# Patient Record
Sex: MALE | Race: AMERICAN INDIAN OR ALASKA NATIVE | HISPANIC OR LATINO | Employment: UNEMPLOYED | URBAN - METROPOLITAN AREA
[De-identification: names, ages, dates, MRNs, and addresses within clinical notes are randomized per-mention and may not be internally consistent; named-entity substitution may affect disease eponyms.]

---

## 2021-01-22 ENCOUNTER — HOSPITAL ENCOUNTER (INPATIENT)
Facility: HOSPITAL | Age: 66
LOS: 4 days | Discharge: HOME | DRG: 871 | End: 2021-01-26
Attending: EMERGENCY MEDICINE | Admitting: INTERNAL MEDICINE
Payer: MEDICARE

## 2021-01-22 ENCOUNTER — APPOINTMENT (EMERGENCY)
Dept: RADIOLOGY | Facility: HOSPITAL | Age: 66
DRG: 871 | End: 2021-01-22
Payer: MEDICARE

## 2021-01-22 DIAGNOSIS — J12.82 PNEUMONIA DUE TO COVID-19 VIRUS: Primary | ICD-10-CM

## 2021-01-22 DIAGNOSIS — A41.9 SEPSIS, DUE TO UNSPECIFIED ORGANISM, UNSPECIFIED WHETHER ACUTE ORGAN DYSFUNCTION PRESENT (HCC): ICD-10-CM

## 2021-01-22 DIAGNOSIS — E11.9 TYPE 2 DIABETES MELLITUS (HCC): ICD-10-CM

## 2021-01-22 DIAGNOSIS — R09.02 HYPOXIA: ICD-10-CM

## 2021-01-22 DIAGNOSIS — U07.1 PNEUMONIA DUE TO COVID-19 VIRUS: Primary | ICD-10-CM

## 2021-01-22 PROBLEM — R73.9 ELEVATED SERUM GLUCOSE: Status: ACTIVE | Noted: 2021-01-22

## 2021-01-22 PROBLEM — E87.1 HYPONATREMIA: Status: ACTIVE | Noted: 2021-01-22

## 2021-01-22 LAB
ABO GROUP BLD: NORMAL
ABO GROUP BLD: NORMAL
ALBUMIN SERPL BCP-MCNC: 3 G/DL (ref 3.5–5)
ALP SERPL-CCNC: 121 U/L (ref 46–116)
ALT SERPL W P-5'-P-CCNC: 76 U/L (ref 12–78)
ANION GAP SERPL CALCULATED.3IONS-SCNC: 8 MMOL/L (ref 4–13)
APTT PPP: 50 SECONDS (ref 23–37)
AST SERPL W P-5'-P-CCNC: 65 U/L (ref 5–45)
BASOPHILS # BLD AUTO: 0.03 THOUSANDS/ΜL (ref 0–0.1)
BASOPHILS NFR BLD AUTO: 0 % (ref 0–1)
BILIRUB SERPL-MCNC: 0.48 MG/DL (ref 0.2–1)
BLD GP AB SCN SERPL QL: NEGATIVE
BUN SERPL-MCNC: 11 MG/DL (ref 5–25)
CALCIUM ALBUM COR SERPL-MCNC: 9.7 MG/DL (ref 8.3–10.1)
CALCIUM SERPL-MCNC: 8.9 MG/DL (ref 8.3–10.1)
CHLORIDE SERPL-SCNC: 95 MMOL/L (ref 100–108)
CK SERPL-CCNC: 54 U/L (ref 39–308)
CO2 SERPL-SCNC: 28 MMOL/L (ref 21–32)
CREAT SERPL-MCNC: 0.93 MG/DL (ref 0.6–1.3)
CRP SERPL HS-MCNC: >90 MG/L
D DIMER PPP FEU-MCNC: 0.65 UG/ML FEU
EOSINOPHIL # BLD AUTO: 0.01 THOUSAND/ΜL (ref 0–0.61)
EOSINOPHIL NFR BLD AUTO: 0 % (ref 0–6)
ERYTHROCYTE [DISTWIDTH] IN BLOOD BY AUTOMATED COUNT: 14 % (ref 11.6–15.1)
FERRITIN SERPL-MCNC: 1806 NG/ML (ref 8–388)
FLUAV RNA RESP QL NAA+PROBE: NEGATIVE
FLUBV RNA RESP QL NAA+PROBE: NEGATIVE
GFR SERPL CREATININE-BSD FRML MDRD: 85 ML/MIN/1.73SQ M
GLUCOSE SERPL-MCNC: 152 MG/DL (ref 65–140)
GLUCOSE SERPL-MCNC: 166 MG/DL (ref 65–140)
GLUCOSE SERPL-MCNC: 204 MG/DL (ref 65–140)
HCT VFR BLD AUTO: 39.7 % (ref 36.5–49.3)
HGB BLD-MCNC: 13.4 G/DL (ref 12–17)
HIV 1+2 AB+HIV1 P24 AG SERPL QL IA: NORMAL
HIV1 P24 AG SER QL: NORMAL
IMM GRANULOCYTES # BLD AUTO: 0.07 THOUSAND/UL (ref 0–0.2)
IMM GRANULOCYTES NFR BLD AUTO: 1 % (ref 0–2)
INR PPP: 1.04 (ref 0.84–1.19)
LACTATE SERPL-SCNC: 1.4 MMOL/L (ref 0.5–2)
LYMPHOCYTES # BLD AUTO: 0.53 THOUSANDS/ΜL (ref 0.6–4.47)
LYMPHOCYTES NFR BLD AUTO: 5 % (ref 14–44)
MCH RBC QN AUTO: 30.1 PG (ref 26.8–34.3)
MCHC RBC AUTO-ENTMCNC: 33.8 G/DL (ref 31.4–37.4)
MCV RBC AUTO: 89 FL (ref 82–98)
MONOCYTES # BLD AUTO: 0.44 THOUSAND/ΜL (ref 0.17–1.22)
MONOCYTES NFR BLD AUTO: 4 % (ref 4–12)
NEUTROPHILS # BLD AUTO: 9.36 THOUSANDS/ΜL (ref 1.85–7.62)
NEUTS SEG NFR BLD AUTO: 90 % (ref 43–75)
NRBC BLD AUTO-RTO: 0 /100 WBCS
NT-PROBNP SERPL-MCNC: 69 PG/ML
PLATELET # BLD AUTO: 330 THOUSANDS/UL (ref 149–390)
PMV BLD AUTO: 9.9 FL (ref 8.9–12.7)
POTASSIUM SERPL-SCNC: 3.5 MMOL/L (ref 3.5–5.3)
PROCALCITONIN SERPL-MCNC: 0.15 NG/ML
PROT SERPL-MCNC: 7.8 G/DL (ref 6.4–8.2)
PROTHROMBIN TIME: 13.7 SECONDS (ref 11.6–14.5)
RBC # BLD AUTO: 4.45 MILLION/UL (ref 3.88–5.62)
RH BLD: POSITIVE
RH BLD: POSITIVE
RSV RNA RESP QL NAA+PROBE: NEGATIVE
SARS-COV-2 RNA RESP QL NAA+PROBE: POSITIVE
SODIUM SERPL-SCNC: 131 MMOL/L (ref 136–145)
SPECIMEN EXPIRATION DATE: NORMAL
TROPONIN I SERPL-MCNC: <0.02 NG/ML
WBC # BLD AUTO: 10.44 THOUSAND/UL (ref 4.31–10.16)

## 2021-01-22 PROCEDURE — 36415 COLL VENOUS BLD VENIPUNCTURE: CPT | Performed by: EMERGENCY MEDICINE

## 2021-01-22 PROCEDURE — 86803 HEPATITIS C AB TEST: CPT | Performed by: STUDENT IN AN ORGANIZED HEALTH CARE EDUCATION/TRAINING PROGRAM

## 2021-01-22 PROCEDURE — 99285 EMERGENCY DEPT VISIT HI MDM: CPT | Performed by: EMERGENCY MEDICINE

## 2021-01-22 PROCEDURE — 99291 CRITICAL CARE FIRST HOUR: CPT

## 2021-01-22 PROCEDURE — 83036 HEMOGLOBIN GLYCOSYLATED A1C: CPT | Performed by: STUDENT IN AN ORGANIZED HEALTH CARE EDUCATION/TRAINING PROGRAM

## 2021-01-22 PROCEDURE — 86705 HEP B CORE ANTIBODY IGM: CPT | Performed by: STUDENT IN AN ORGANIZED HEALTH CARE EDUCATION/TRAINING PROGRAM

## 2021-01-22 PROCEDURE — 83605 ASSAY OF LACTIC ACID: CPT | Performed by: EMERGENCY MEDICINE

## 2021-01-22 PROCEDURE — XW033E5 INTRODUCTION OF REMDESIVIR ANTI-INFECTIVE INTO PERIPHERAL VEIN, PERCUTANEOUS APPROACH, NEW TECHNOLOGY GROUP 5: ICD-10-PCS | Performed by: FAMILY MEDICINE

## 2021-01-22 PROCEDURE — 93005 ELECTROCARDIOGRAM TRACING: CPT

## 2021-01-22 PROCEDURE — 87340 HEPATITIS B SURFACE AG IA: CPT | Performed by: STUDENT IN AN ORGANIZED HEALTH CARE EDUCATION/TRAINING PROGRAM

## 2021-01-22 PROCEDURE — 85025 COMPLETE CBC W/AUTO DIFF WBC: CPT | Performed by: EMERGENCY MEDICINE

## 2021-01-22 PROCEDURE — 80053 COMPREHEN METABOLIC PANEL: CPT | Performed by: EMERGENCY MEDICINE

## 2021-01-22 PROCEDURE — 85730 THROMBOPLASTIN TIME PARTIAL: CPT | Performed by: EMERGENCY MEDICINE

## 2021-01-22 PROCEDURE — 85379 FIBRIN DEGRADATION QUANT: CPT | Performed by: EMERGENCY MEDICINE

## 2021-01-22 PROCEDURE — 99223 1ST HOSP IP/OBS HIGH 75: CPT | Performed by: INTERNAL MEDICINE

## 2021-01-22 PROCEDURE — 86901 BLOOD TYPING SEROLOGIC RH(D): CPT | Performed by: EMERGENCY MEDICINE

## 2021-01-22 PROCEDURE — 82948 REAGENT STRIP/BLOOD GLUCOSE: CPT

## 2021-01-22 PROCEDURE — 86141 C-REACTIVE PROTEIN HS: CPT | Performed by: EMERGENCY MEDICINE

## 2021-01-22 PROCEDURE — 86900 BLOOD TYPING SEROLOGIC ABO: CPT | Performed by: EMERGENCY MEDICINE

## 2021-01-22 PROCEDURE — 0241U HB NFCT DS VIR RESP RNA 4 TRGT: CPT | Performed by: EMERGENCY MEDICINE

## 2021-01-22 PROCEDURE — 96365 THER/PROPH/DIAG IV INF INIT: CPT

## 2021-01-22 PROCEDURE — 86704 HEP B CORE ANTIBODY TOTAL: CPT | Performed by: STUDENT IN AN ORGANIZED HEALTH CARE EDUCATION/TRAINING PROGRAM

## 2021-01-22 PROCEDURE — 82728 ASSAY OF FERRITIN: CPT | Performed by: EMERGENCY MEDICINE

## 2021-01-22 PROCEDURE — 82550 ASSAY OF CK (CPK): CPT | Performed by: STUDENT IN AN ORGANIZED HEALTH CARE EDUCATION/TRAINING PROGRAM

## 2021-01-22 PROCEDURE — 86850 RBC ANTIBODY SCREEN: CPT | Performed by: EMERGENCY MEDICINE

## 2021-01-22 PROCEDURE — 85610 PROTHROMBIN TIME: CPT | Performed by: EMERGENCY MEDICINE

## 2021-01-22 PROCEDURE — 83880 ASSAY OF NATRIURETIC PEPTIDE: CPT | Performed by: EMERGENCY MEDICINE

## 2021-01-22 PROCEDURE — 84484 ASSAY OF TROPONIN QUANT: CPT | Performed by: EMERGENCY MEDICINE

## 2021-01-22 PROCEDURE — 96361 HYDRATE IV INFUSION ADD-ON: CPT

## 2021-01-22 PROCEDURE — 71045 X-RAY EXAM CHEST 1 VIEW: CPT

## 2021-01-22 PROCEDURE — 87806 HIV AG W/HIV1&2 ANTB W/OPTIC: CPT | Performed by: STUDENT IN AN ORGANIZED HEALTH CARE EDUCATION/TRAINING PROGRAM

## 2021-01-22 PROCEDURE — 84145 PROCALCITONIN (PCT): CPT | Performed by: EMERGENCY MEDICINE

## 2021-01-22 PROCEDURE — 87040 BLOOD CULTURE FOR BACTERIA: CPT | Performed by: EMERGENCY MEDICINE

## 2021-01-22 RX ORDER — MULTIVITAMIN/IRON/FOLIC ACID 18MG-0.4MG
1 TABLET ORAL DAILY
Status: DISCONTINUED | OUTPATIENT
Start: 2021-01-30 | End: 2021-01-26 | Stop reason: HOSPADM

## 2021-01-22 RX ORDER — DOXYCYCLINE HYCLATE 100 MG/1
100 CAPSULE ORAL EVERY 12 HOURS
Status: DISCONTINUED | OUTPATIENT
Start: 2021-01-23 | End: 2021-01-24

## 2021-01-22 RX ORDER — ASCORBIC ACID 500 MG
1000 TABLET ORAL EVERY 12 HOURS SCHEDULED
Status: DISCONTINUED | OUTPATIENT
Start: 2021-01-22 | End: 2021-01-26 | Stop reason: HOSPADM

## 2021-01-22 RX ORDER — MELATONIN
2000 DAILY
Status: DISCONTINUED | OUTPATIENT
Start: 2021-01-23 | End: 2021-01-26 | Stop reason: HOSPADM

## 2021-01-22 RX ORDER — DOXYCYCLINE HYCLATE 100 MG/1
100 CAPSULE ORAL EVERY 12 HOURS
Status: DISCONTINUED | OUTPATIENT
Start: 2021-01-22 | End: 2021-01-22

## 2021-01-22 RX ORDER — ONDANSETRON 2 MG/ML
4 INJECTION INTRAMUSCULAR; INTRAVENOUS EVERY 4 HOURS PRN
Status: DISCONTINUED | OUTPATIENT
Start: 2021-01-22 | End: 2021-01-26 | Stop reason: HOSPADM

## 2021-01-22 RX ORDER — ZINC SULFATE 50(220)MG
220 CAPSULE ORAL DAILY
Status: DISCONTINUED | OUTPATIENT
Start: 2021-01-23 | End: 2021-01-26 | Stop reason: HOSPADM

## 2021-01-22 RX ORDER — FAMOTIDINE 20 MG/1
20 TABLET, FILM COATED ORAL EVERY 12 HOURS SCHEDULED
Status: DISCONTINUED | OUTPATIENT
Start: 2021-01-22 | End: 2021-01-26 | Stop reason: HOSPADM

## 2021-01-22 RX ORDER — LANOLIN ALCOHOL/MO/W.PET/CERES
6 CREAM (GRAM) TOPICAL
Status: DISCONTINUED | OUTPATIENT
Start: 2021-01-22 | End: 2021-01-26 | Stop reason: HOSPADM

## 2021-01-22 RX ORDER — ATORVASTATIN CALCIUM 40 MG/1
40 TABLET, FILM COATED ORAL
Status: DISCONTINUED | OUTPATIENT
Start: 2021-01-22 | End: 2021-01-26 | Stop reason: HOSPADM

## 2021-01-22 RX ORDER — SODIUM CHLORIDE 9 MG/ML
100 INJECTION, SOLUTION INTRAVENOUS CONTINUOUS
Status: DISCONTINUED | OUTPATIENT
Start: 2021-01-22 | End: 2021-01-24

## 2021-01-22 RX ORDER — ONDANSETRON 2 MG/ML
4 INJECTION INTRAMUSCULAR; INTRAVENOUS ONCE
Status: COMPLETED | OUTPATIENT
Start: 2021-01-22 | End: 2021-01-22

## 2021-01-22 RX ORDER — ACETAMINOPHEN 325 MG/1
975 TABLET ORAL ONCE
Status: COMPLETED | OUTPATIENT
Start: 2021-01-22 | End: 2021-01-22

## 2021-01-22 RX ORDER — NAPROXEN SODIUM 220 MG
220 TABLET ORAL 2 TIMES DAILY WITH MEALS
COMMUNITY
End: 2021-03-25 | Stop reason: ALTCHOICE

## 2021-01-22 RX ORDER — ACETAMINOPHEN 325 MG/1
650 TABLET ORAL EVERY 6 HOURS PRN
Status: DISCONTINUED | OUTPATIENT
Start: 2021-01-22 | End: 2021-01-26 | Stop reason: HOSPADM

## 2021-01-22 RX ORDER — KETOROLAC TROMETHAMINE 30 MG/ML
15 INJECTION, SOLUTION INTRAMUSCULAR; INTRAVENOUS ONCE
Status: DISCONTINUED | OUTPATIENT
Start: 2021-01-22 | End: 2021-01-22

## 2021-01-22 RX ORDER — DOXYCYCLINE HYCLATE 100 MG/1
100 CAPSULE ORAL ONCE
Status: COMPLETED | OUTPATIENT
Start: 2021-01-22 | End: 2021-01-22

## 2021-01-22 RX ORDER — DEXAMETHASONE SODIUM PHOSPHATE 4 MG/ML
6 INJECTION, SOLUTION INTRA-ARTICULAR; INTRALESIONAL; INTRAMUSCULAR; INTRAVENOUS; SOFT TISSUE EVERY 24 HOURS
Status: DISCONTINUED | OUTPATIENT
Start: 2021-01-22 | End: 2021-01-26 | Stop reason: HOSPADM

## 2021-01-22 RX ADMIN — ENOXAPARIN SODIUM 30 MG: 30 INJECTION SUBCUTANEOUS at 21:10

## 2021-01-22 RX ADMIN — FAMOTIDINE 20 MG: 20 TABLET ORAL at 21:09

## 2021-01-22 RX ADMIN — SODIUM CHLORIDE 1000 ML: 0.9 INJECTION, SOLUTION INTRAVENOUS at 14:42

## 2021-01-22 RX ADMIN — SODIUM CHLORIDE 100 ML/HR: 0.9 INJECTION, SOLUTION INTRAVENOUS at 18:55

## 2021-01-22 RX ADMIN — ONDANSETRON 4 MG: 2 INJECTION INTRAMUSCULAR; INTRAVENOUS at 16:54

## 2021-01-22 RX ADMIN — DEXAMETHASONE SODIUM PHOSPHATE 6 MG: 4 INJECTION INTRA-ARTICULAR; INTRALESIONAL; INTRAMUSCULAR; INTRAVENOUS; SOFT TISSUE at 21:09

## 2021-01-22 RX ADMIN — ACETAMINOPHEN 975 MG: 325 TABLET, FILM COATED ORAL at 15:38

## 2021-01-22 RX ADMIN — DOXYCYCLINE 100 MG: 100 CAPSULE ORAL at 14:52

## 2021-01-22 RX ADMIN — CEFTRIAXONE SODIUM 1000 MG: 10 INJECTION, POWDER, FOR SOLUTION INTRAVENOUS at 15:40

## 2021-01-22 RX ADMIN — OXYCODONE HYDROCHLORIDE AND ACETAMINOPHEN 1000 MG: 500 TABLET ORAL at 21:09

## 2021-01-22 RX ADMIN — ATORVASTATIN CALCIUM 40 MG: 40 TABLET, FILM COATED ORAL at 22:10

## 2021-01-22 RX ADMIN — INSULIN LISPRO 1 UNITS: 100 INJECTION, SOLUTION INTRAVENOUS; SUBCUTANEOUS at 19:58

## 2021-01-22 RX ADMIN — REMDESIVIR 200 MG: 100 INJECTION, POWDER, LYOPHILIZED, FOR SOLUTION INTRAVENOUS at 22:21

## 2021-01-22 RX ADMIN — MELATONIN 6 MG: at 22:10

## 2021-01-22 NOTE — ASSESSMENT & PLAN NOTE
· 204 POA   No diagnosed history of DM   · Will check hemoglobin A1c now  · Accuchecks + SSI considering the patient is likely diabetic and will be started on steroids

## 2021-01-22 NOTE — ED PROVIDER NOTES
History  Chief Complaint   Patient presents with    Shortness of Breath     Pt c/o SOB, headache, congestion x2 days  History provided by:  Patient and spouse  Shortness of Breath  Severity:  Moderate  Onset quality:  Gradual  Duration:  2 days  Timing:  Constant  Progression:  Worsening  Chronicity:  New  Context: URI    Relieved by:  Rest  Worsened by: Activity  Ineffective treatments:  None tried  Associated symptoms: cough, fever, headaches and sore throat    Associated symptoms: no abdominal pain, no chest pain, no diaphoresis, no neck pain, no rash, no vomiting and no wheezing    Associated symptoms comment:  Loss of the sense of taste, cough occasionally productive but mostly dry, generalized fatigue, generalized body aches, headache, no neck pain, no no meningismus, no abdominal pain no bowel changes  Risk factors comment:  Patient believes he had a COVID exposure about a week ago      Prior to Admission Medications   Prescriptions Last Dose Informant Patient Reported? Taking?   naproxen sodium (Aleve) 220 MG tablet 1/22/2021 at Unknown time  Yes Yes   Sig: Take 220 mg by mouth 2 (two) times a day with meals      Facility-Administered Medications: None       History reviewed  No pertinent past medical history  History reviewed  No pertinent surgical history  Family History   Problem Relation Age of Onset    Colon cancer Mother     Hyperlipidemia Father     Parkinsonism Father      I have reviewed and agree with the history as documented  E-Cigarette/Vaping     E-Cigarette/Vaping Substances     Social History     Tobacco Use    Smoking status: Former Smoker   Substance Use Topics    Alcohol use: Yes     Comment: social    Drug use: No       Review of Systems   Constitutional: Positive for appetite change, chills and fever  Negative for activity change and diaphoresis  HENT: Positive for sore throat  Negative for congestion and sinus pressure      Eyes: Negative for pain and visual disturbance  Respiratory: Positive for cough and shortness of breath  Negative for chest tightness, wheezing and stridor  Cardiovascular: Negative for chest pain and palpitations  Gastrointestinal: Negative for abdominal distention, abdominal pain, constipation, diarrhea, nausea and vomiting  Genitourinary: Negative for dysuria and frequency  Musculoskeletal: Negative for neck pain and neck stiffness  Skin: Negative for rash  Neurological: Positive for headaches  Negative for dizziness, speech difficulty, light-headedness and numbness  Physical Exam  Physical Exam  Vitals signs reviewed  Constitutional:       General: He is in acute distress  Appearance: He is well-developed  He is not diaphoretic  HENT:      Head: Normocephalic and atraumatic  Right Ear: External ear normal       Left Ear: External ear normal       Nose: Nose normal    Eyes:      General:         Right eye: No discharge  Left eye: No discharge  Pupils: Pupils are equal, round, and reactive to light  Neck:      Musculoskeletal: Normal range of motion and neck supple  Trachea: No tracheal deviation  Cardiovascular:      Rate and Rhythm: Regular rhythm  Tachycardia present  Heart sounds: Normal heart sounds  No murmur  Pulmonary:      Effort: Pulmonary effort is normal  Tachypnea present  No respiratory distress  Breath sounds: No stridor  Decreased breath sounds present  Abdominal:      General: There is no distension  Palpations: Abdomen is soft  Tenderness: There is no abdominal tenderness  There is no guarding or rebound  Musculoskeletal: Normal range of motion  Skin:     General: Skin is warm and dry  Coloration: Skin is not pale  Findings: No erythema  Neurological:      General: No focal deficit present  Mental Status: He is alert and oriented to person, place, and time           Vital Signs  ED Triage Vitals   Temperature Pulse Respirations Blood Pressure SpO2   01/22/21 1356 01/22/21 1349 01/22/21 1349 01/22/21 1349 01/22/21 1349   (!) 101 5 °F (38 6 °C) (!) 117 22 145/65 (!) 85 %      Temp Source Heart Rate Source Patient Position - Orthostatic VS BP Location FiO2 (%)   01/22/21 1356 01/22/21 1349 01/22/21 1349 01/22/21 1349 --   Oral Monitor Sitting Right arm       Pain Score       --                  Vitals:    01/22/21 1349   BP: 145/65   Pulse: (!) 117   Patient Position - Orthostatic VS: Sitting         Visual Acuity      ED Medications  Medications   ondansetron (ZOFRAN) injection 4 mg (has no administration in time range)   sodium chloride 0 9 % bolus 1,000 mL (1,000 mL Intravenous New Bag 1/22/21 1442)   ceftriaxone (ROCEPHIN) 1 g/50 mL in dextrose IVPB (1,000 mg Intravenous New Bag 1/22/21 1540)   doxycycline hyclate (VIBRAMYCIN) capsule 100 mg (100 mg Oral Given 1/22/21 1452)   acetaminophen (TYLENOL) tablet 975 mg (975 mg Oral Given 1/22/21 1538)       Diagnostic Studies  Results Reviewed     Procedure Component Value Units Date/Time    COVID19, Influenza A/B, RSV PCR, [facility] [[id number]]  (Abnormal) Collected: 01/22/21 1438    Lab Status: Final result Specimen: Nares from Nose Updated: 01/22/21 1610     SARS-CoV-2 Positive     INFLUENZA A PCR Negative     INFLUENZA B PCR Negative     RSV PCR Negative    Narrative: This test has been authorized by FDA under an EUA (Emergency Use Assay) for use by authorized laboratories  Clinical caution and judgement should be used with the interpretation of these results with consideration of the clinical impression and other laboratory testing  Testing reported as "Positive" or "Negative" has been proven to be accurate according to standard laboratory validation requirements  All testing is performed with control materials showing appropriate reactivity at standard intervals      CBC and differential [[id number]]  (Abnormal) Collected: 01/22/21 1441    Lab Status: Final result Specimen: Blood from Arm, Right Updated: 01/22/21 1540     WBC 10 44 Thousand/uL      RBC 4 45 Million/uL      Hemoglobin 13 4 g/dL      Hematocrit 39 7 %      MCV 89 fL      MCH 30 1 pg      MCHC 33 8 g/dL      RDW 14 0 %      MPV 9 9 fL      Platelets 997 Thousands/uL      nRBC 0 /100 WBCs      Neutrophils Relative 90 %      Immat GRANS % 1 %      Lymphocytes Relative 5 %      Monocytes Relative 4 %      Eosinophils Relative 0 %      Basophils Relative 0 %      Neutrophils Absolute 9 36 Thousands/µL      Immature Grans Absolute 0 07 Thousand/uL      Lymphocytes Absolute 0 53 Thousands/µL      Monocytes Absolute 0 44 Thousand/µL      Eosinophils Absolute 0 01 Thousand/µL      Basophils Absolute 0 03 Thousands/µL     Narrative: This is an appended report  These results have been appended to a previously verified report  Lactic acid [[id number]]  (Normal) Collected: 01/22/21 1438    Lab Status: Final result Specimen: Blood from Arm, Right Updated: 01/22/21 1531     LACTIC ACID 1 4 mmol/L     Narrative:      Result may be elevated if tourniquet was used during collection  Troponin I [[id number]]  (Normal) Collected: 01/22/21 1441    Lab Status: Final result Specimen: Blood from Arm, Right Updated: 01/22/21 1530     Troponin I <0 02 ng/mL     Protime-INR [[id number]]  (Normal) Collected: 01/22/21 1439    Lab Status: Final result Specimen: Blood from Arm, Right Updated: 01/22/21 1525     Protime 13 7 seconds      INR 1 04    APTT [[id number]]  (Abnormal) Collected: 01/22/21 1439    Lab Status: Final result Specimen: Blood from Arm, Right Updated: 01/22/21 1525     PTT 50 seconds     D-Dimer [[id number]]  (Abnormal) Collected: 01/22/21 1439    Lab Status: Final result Specimen: Blood from Arm, Right Updated: 01/22/21 1525     D-Dimer, Quant 0 65 ug/ml FEU     Procalcitonin with AM Reflex [[id number]] Collected: 01/22/21 1452    Lab Status:  In process Specimen: Blood from Arm, Left Updated: 01/22/21 1503    Ferritin [[id number]] Collected: 01/22/21 1441    Lab Status: In process Specimen: Blood from Arm, Right Updated: 01/22/21 1503    Comprehensive metabolic panel [[id number]] Collected: 01/22/21 1441    Lab Status: In process Specimen: Blood from Arm, Right Updated: 01/22/21 1503    High sensitivity CRP [[id number]] Collected: 01/22/21 1441    Lab Status: In process Specimen: Blood from Arm, Right Updated: 01/22/21 1503    NT-BNP PRO [[id number]] Collected: 01/22/21 1441    Lab Status: In process Specimen: Blood from Arm, Right Updated: 01/22/21 1503    Blood culture #2 [[id number]] Collected: 01/22/21 1439    Lab Status: In process Specimen: Blood from Arm, Right Updated: 01/22/21 1502    Blood culture #1 [[id number]] Collected: 01/22/21 1452    Lab Status: In process Specimen: Blood from Arm, Left Updated: 01/22/21 1502                 XR chest 1 view portable   ED Interpretation by Jonathan Acosta DO (01/22 1432)   Diffuse bilateral infiltrates consistent with COVID pneumonia      Final Result by José Luis Reinoso MD (01/22 1537)      Bilateral airspace opacities  In the setting of clinically suspected/proven COVID-19, this plain film appearance while nonspecific, can be seen in cases of viral pneumonia such as COVID-19                                Workstation performed: [id number]                    Procedures  ECG 12 Lead Documentation Only    Date/Time: 1/22/2021 2:33 PM  Performed by: Jonathan Acosta DO  Authorized by: Jonathan Acosta DO     ECG reviewed by me, the ED Provider: yes    Patient location:  ED  Previous ECG:     Previous ECG:  Compared to current    Comparison ECG info:  6 14 2008    Similarity:  No change  Interpretation:     Interpretation: non-specific    Rate:     ECG rate:  110    ECG rate assessment: tachycardic    Rhythm:     Rhythm: sinus rhythm    Ectopy:     Ectopy: none    QRS:     QRS axis:  Normal    QRS intervals:  Normal  Conduction:     Conduction: normal    ST segments:     ST segments:  Non-specific  T waves:     T waves: non-specific      CriticalCare Time  Performed by: Sandra Capellan DO  Authorized by: Sandra Capellan DO     Critical care provider statement:     Critical care time (minutes):  35    Critical care time was exclusive of:  Separately billable procedures and treating other patients    Critical care was necessary to treat or prevent imminent or life-threatening deterioration of the following conditions: COVID pneumonia with hypoxia, treated with nasal cannula oxygen, sepsis,    Critical care was time spent personally by me on the following activities:  Obtaining history from patient or surrogate, development of treatment plan with patient or surrogate, discussions with consultants, evaluation of patient's response to treatment, examination of patient, ordering and performing treatments and interventions, ordering and review of laboratory studies, ordering and review of radiographic studies, re-evaluation of patient's condition and review of old charts             ED Course  ED Course as of Jan 22 1619 Fri Jan 22, 2021   1433 Discussed x-ray results with patient and wife, high suspicion for COVID pneumonia  , patient does have improvement with nasal cannula oxygen, saturations in the mid 90s  , advised wife leave the room                                              MDM  Number of Diagnoses or Management Options  Hypoxia: new and requires workup  Pneumonia due to COVID-19 virus: new and requires workup  Sepsis, due to unspecified organism, unspecified whether acute organ dysfunction present [facility]): new and requires workup  Diagnosis management comments:       Initial ED assessment:  53-year-old male, shortness of breath, cough, generalized weakness    Initial DDx includes but is not limited to:   COVID pneumonia, bacterial pneumonia/community-acquired pneumonia    Initial ED plan:   Oxygen, chest x-ray, IV antibiotics due to meeting SIRS/sepsis criteria, blood work, IV fluids, admission as patient is requiring oxygen is hypoxic on room air    Patient with unstable vital sign of a low oxygen saturation  (patient hypoxic)  Because of this nasal cannula oxygen was applied  , this improved patient's oxygen saturation to noncritical level  Final ED summary/disposition:   After evaluation and workup in the emergency department, patient with significant improvement during ER stay  Mid to hospital for treatment of COVID pneumonia  Amount and/or Complexity of Data Reviewed  Clinical lab tests: ordered and reviewed  Tests in the radiology section of CPT®: ordered and reviewed  Decide to obtain previous medical records or to obtain history from someone other than the patient: yes  Obtain history from someone other than the patient: yes  Review and summarize past medical records: yes  Discuss the patient with other providers: yes  Independent visualization of images, tracings, or specimens: yes        Disposition  Final diagnoses:   Pneumonia due to COVID-19 virus   Sepsis, due to unspecified organism, unspecified whether acute organ dysfunction present [facility])   Hypoxia     Time reflects when diagnosis was documented in both MDM as applicable and the Disposition within this note     Time User Action Codes Description Comment    1/22/2021  4:16 PM Maynor Burkett Add [U07 1,  J12 82] Pneumonia due to COVID-19 virus     1/22/2021  4:16 PM Glenda Velez, [address] [facility] [A41 9] Sepsis, due to unspecified organism, unspecified whether acute organ dysfunction present ([facility] [address] )     1/22/2021  4:16 PM Maynor Burkett Add [R09 02] Hypoxia       ED Disposition     ED Disposition Condition Date/Time Comment    Admit Stable Fri Jan 22, 2021  4:16 PM Case was discussed with Dr Khalida Pitt the patient's admission status was agreed to be Admission Status: inpatient status to the service of Dr Tona Sánchez            Follow-up Information    None         Patient's Medications   Discharge Prescriptions    No medications on file     No discharge procedures on file      PDMP Review     None          ED Provider  Electronically Signed by           Dayan Alvarez DO  01/22/21 1178

## 2021-01-22 NOTE — ASSESSMENT & PLAN NOTE
· 131 POA  133 when correcting for hyperglycemia     · 1000cc bolus NS in the ED for sepsis criteria   · Will continue to hydrate the patient with 100cc/hr NS  · Repeat BMP in the AM to monitor electrolytes

## 2021-01-22 NOTE — ASSESSMENT & PLAN NOTE
Cystic Fibrosis Carrier Testing  Lyudmila Serrano    The following information is about a blood test that can be done to determine if you and/or your partner carry the gene for cystic fibrosis.    WHAT IS CYSTIC FIBROSIS?  · Cystic fibrosis (CF) is an inherited disease that affects more than 25,000 [place] children and young adults.  · Symptoms of CF vary but include lung congestion, pneumonia, diarrhea and poor growth.  Most people with CF have severe medical problems and some die at a young age.  Others have so few symptoms they are unaware they have CF.  · CF does not affect intelligence.  · Although there is no cure for CF at this time, scientists are making progress in improving treatment and in searching for a cure.  In the past many people with CF  at a very young age.  Today, many are living into their 20’s and 30’s.    IS THERE A CHANCE MY BABY COULD HAVE CYSTIC FIBROSIS?  · You can have a child with CF even if there is no history in your family (see chart below).  · CF testing can help determine if you are a carrier and at risk to have a child with CF.  Note: if both parents are carriers, there is a 1 in 4 (25%) chance with each pregnancy that they will have a child with CF.  · Carriers have one normal CF gene and one altered CF gene.  · People with CF have two altered CF genes.  · Most people have two normal copies of the CF gene.    Approximate risk that a couple with no family history of cystic fibrosis will have a child with cystic fibrosis:    Ethnic background / Risk     couple:  1 in 2,500   couple:  1 in 15,000            couple:  1 in 8,000     American couple:  1 in 32,000     WHAT TESTING IS AVAILABLE?  · There is a blood test that can be done to find out if you or your partner is a carrier.  · It is important to understand that CF carrier testing does not detect all CF carriers.  · If the test shows that you are both CF carriers, you unborn baby can be  Lab Results   Component Value Date    SARSCOV2 Positive (A) 2021       Patient presenting with symptoms of COVID-19 SYMPTOMS: shortness of breath  Patient experiencing worsening SOB over the past 2-3 days with subjective fevers, chills, and non-productive cough  Workup:  · Imaging:  Xr Chest 1 View Portable    Result Date: 2021  Impression: Bilateral airspace opacities  In the setting of clinically suspected/proven COVID-19, this plain film appearance while nonspecific, can be seen in cases of viral pneumonia such as COVID-19  Workstation performed: [id number]       · Labs:     Recent Labs     21  1439   DDIMER 0 65*       No results for input(s): PROCALCITONI in the last 72 hours  · Oxygen:  Saturating 96%% on 4 liters/min via nasal cannula    COVID Stage: MODERATE    Plan:  · Labs:  · Blood type and screen  · Cardiac markers - troponin, CK, BNP  · Inflammatory markers - CRP, D-dimer, ferritin  · Trend procalcitonin  · Consider d/c Abx if negative x 2  · CMP tomorrow a m   · CBC with diff tomorrow a m    · Meds  · Ceftriaxone 1 g IV qd  · Doxycycline 100 mg PO Q12h  · Vitamin D3 2000 units daily  · Vitamin C 1000 BID  · Zinc 220 mg x 7 days then multivitamin qd  · Famotidine 20 mg PO b i d  for acid suppressive therapy  · Remdesevir 200 mg IV 1 day, then 100 mg IV daily x4 days  · Dexamethasone 6 mg IV daily times 10 days  · Atorvastatin 40 mg PO HS    · Anticoamg lovonox BID  · Non-pharmacologic interventions  · PT and OT eval and treat  · Self-proning if able  · Incentive spirometry  · Consult Pulm/CC if worsening oxygen requirements tested to find out if the baby has CF.    HOW MUCH DOES IT COST TO HAVE CYSTIC FIBROSIS CARRIER TESTING?  · Cost and insurance coverage for CF carrier testing vary depending upon the laboratory used and your insurance policy.  · The average cost for CF carrier testing is $300 per person.  · Your genetic counselor can provide you with more information about cystic fibrosis carrier testing.    _____  Yes, I am interested in discussing carrier testing with a genetic counselor.    _____  No, I am not interested in CF carrier testing or in receiving more information about CF carrier testing.      Client signature: ________________________________________  3/11/2020

## 2021-01-22 NOTE — H&P
H&P- Livepapa Divine 1955, 76 y o  male MRN: [id number]    Unit/Bed#: ED 10 Encounter: [id number]    Primary Care Provider: Dez Vazquez DO   Date and time admitted to hospital: 1/22/2021  1:50 PM        Elevated serum glucose  Assessment & Plan  · 204 POA  No diagnosed history of DM   · Will check hemoglobin A1c now  · Accuchecks + SSI considering the patient is likely diabetic and will be started on steroids     Hyponatremia  Assessment & Plan  · 131 POA  133 when correcting for hyperglycemia  · 1000cc bolus NS in the ED for sepsis criteria   · Will continue to hydrate the patient with 100cc/hr NS  · Repeat BMP in the AM to monitor electrolytes     Sepsis ([facility] [address] )  Assessment & Plan  · Meets SIRS criteria with tachypnia, tachycardia, and leukocytosis POA w/ likely source as COVID pneumonia   · Continue to monitor vitals     Pneumonia due to COVID-19 virus  Assessment & Plan  Lab Results   Component Value Date    SARSCOV2 Positive (A) 01/22/2021       Patient presenting with symptoms of COVID-19 SYMPTOMS: shortness of breath  Patient experiencing worsening SOB over the past 2-3 days with subjective fevers, chills, and non-productive cough  Workup:  · Imaging:  Xr Chest 1 View Portable    Result Date: 1/22/2021  Impression: Bilateral airspace opacities  In the setting of clinically suspected/proven COVID-19, this plain film appearance while nonspecific, can be seen in cases of viral pneumonia such as COVID-19  Workstation performed: [id number]       · Labs:     Recent Labs     01/22/21  1439   DDIMER 0 65*       No results for input(s): PROCALCITONI in the last 72 hours      · Oxygen:  Saturating 96%% on 4 liters/min via nasal cannula    COVID Stage: MODERATE    Plan:  · Labs:  · Blood type and screen  · Cardiac markers - troponin, CK, BNP  · Inflammatory markers - CRP, D-dimer, ferritin  · Trend procalcitonin  · Consider d/c Abx if negative x 2  · CMP tomorrow a m   · CBC with diff tomorrow a m   · Meds  · Ceftriaxone 1 g IV qd  · Doxycycline 100 mg PO Q12h  · Vitamin D3 2000 units daily  · Vitamin C 1000 BID  · Zinc 220 mg x 7 days then multivitamin qd  · Famotidine 20 mg PO b i d  for acid suppressive therapy  · Remdesevir 200 mg IV 1 day, then 100 mg IV daily x4 days  · Dexamethasone 6 mg IV daily times 10 days  · Atorvastatin 40 mg PO HS    · Anticoamg lovonox BID  · Non-pharmacologic interventions  · PT and OT eval and treat  · Self-proning if able  · Incentive spirometry  · Consult Pulm/CC if worsening oxygen requirements        VTE Prophylaxis: Enoxaparin (Lovenox)  / sequential compression device   Code Status: Level 1  POLST: POLST form is not discussed and not completed at this time  Anticipated Length of Stay:  Patient will be admitted on an Inpatient basis with an anticipated length of stay of  Greater than 2 midnights  Justification for Hospital Stay: COVID pneumonia with secondary sepsis     Chief Complaint:  SOB     History of Present Illness:    Fany Calderon is a 76 y o  male with no pertinent past medical history who presents with worsening SOB over the past 2-3 days  Associated fevers, chills, nausea, and productive cough  Went to [facility] 2 days ago and was prescribed amoxicillin for assumed pneumonia  No sick contacts  Unsure of past medical history  Does not follow-up with a PCP regularly  Review of Systems:    Review of Systems   Constitutional: Positive for chills, fatigue and fever  HENT: Positive for sore throat  Negative for sinus pressure and sinus pain  Eyes: Negative for pain and redness  Respiratory: Positive for cough and shortness of breath  Negative for chest tightness and wheezing  Cardiovascular: Negative for chest pain and leg swelling  Gastrointestinal: Positive for nausea  Negative for abdominal pain, diarrhea and vomiting  Endocrine: Negative for cold intolerance and heat intolerance     Genitourinary: Negative for dysuria, frequency and urgency  Musculoskeletal: Positive for myalgias  Negative for back pain and neck pain  Skin: Negative for color change and rash  Neurological: Positive for headaches  Negative for dizziness, weakness and numbness  Past Medical and Surgical History:     History reviewed  No pertinent past medical history  History reviewed  No pertinent surgical history  Meds/Allergies:    Prior to Admission medications    Medication Sig Start Date End Date Taking? Authorizing Provider   naproxen sodium (Aleve) 220 MG tablet Take 220 mg by mouth 2 (two) times a day with meals   Yes Historical Provider, MD     I have reviewed home medications with patient personally  Allergies: No Known Allergies    Social History:     Marital Status: /Civil Union   Occupation: Unknown  Patient Pre-hospital Living Situation: With wife   Patient Pre-hospital Level of Mobility: Ambulatory without assistance   Patient Pre-hospital Diet Restrictions: None   Substance Use History:   Social History     Substance and Sexual Activity   Alcohol Use Yes    Comment: social     Social History     Tobacco Use   Smoking Status Former Smoker     Social History     Substance and Sexual Activity   Drug Use No       Family History:    non-contributory    Physical Exam:     Vitals:   Blood Pressure: 120/58 (01/22/21 1645)  Pulse: 104 (01/22/21 1645)  Temperature: (!) 100 7 °F (38 2 °C) (01/22/21 1645)  Temp Source: Oral (01/22/21 1645)  Respirations: (!) 24 (01/22/21 1645)  Weight - Scale: 89 2 kg (196 lb 10 4 oz) (01/22/21 1400)  SpO2: 94 % (01/22/21 1645)    Physical Exam  Constitutional:       General: He is not in acute distress  Appearance: He is normal weight  He is ill-appearing  HENT:      Head: Normocephalic and atraumatic  Mouth/Throat:      Mouth: Mucous membranes are moist       Pharynx: Oropharynx is clear  Eyes:      Extraocular Movements: Extraocular movements intact        Pupils: Pupils are equal, round, and reactive to light  Neck:      Musculoskeletal: Normal range of motion and neck supple  Cardiovascular:      Rate and Rhythm: Regular rhythm  Tachycardia present  Heart sounds: No murmur  Pulmonary:      Breath sounds: Rales present  No wheezing  Comments: Increase respiratory effort  Tachypnic  Decreased breath sounds at the bases BL  Rales throughout  Abdominal:      General: Abdomen is flat  Palpations: Abdomen is soft  Tenderness: There is no abdominal tenderness  There is no guarding or rebound  Musculoskeletal: Normal range of motion  Skin:     General: Skin is warm and dry  Neurological:      General: No focal deficit present  Mental Status: He is alert and oriented to person, place, and time  Psychiatric:         Mood and Affect: Mood normal            Additional Data:     Lab Results: I have personally reviewed pertinent reports  Results from last 7 days   Lab Units 01/22/21  1441   WBC Thousand/uL 10 44*   HEMOGLOBIN g/dL 13 4   HEMATOCRIT % 39 7   PLATELETS Thousands/uL 330   NEUTROS PCT % 90*   LYMPHS PCT % 5*   MONOS PCT % 4   EOS PCT % 0     Results from last 7 days   Lab Units 01/22/21  1441   POTASSIUM mmol/L 3 5   CHLORIDE mmol/L 95*   CO2 mmol/L 28   BUN mg/dL 11   CREATININE mg/dL 0 93   CALCIUM mg/dL 8 9   ALK PHOS U/L 121*   ALT U/L 76   AST U/L 65*     Results from last 7 days   Lab Units 01/22/21  1439   INR  1 04       Imaging: I have personally reviewed pertinent reports  Xr Chest 1 View Portable    Result Date: 1/22/2021  Narrative: CHEST INDICATION:   Shortness of breath, high suspicion for COVID  Patient has suspected COVID-19  COMPARISON:  Chest x-ray 6/14/08 EXAM PERFORMED/VIEWS:  XR CHEST PORTABLE FINDINGS: Cardiomediastinal silhouette appears unremarkable  There are bilateral predominantly airspace opacities throughout the lungs, most prominently within the right lung  No pneumothorax  No large pleural effusion   Osseous structures appear within normal limits for patient age  Impression: Bilateral airspace opacities  In the setting of clinically suspected/proven COVID-19, this plain film appearance while nonspecific, can be seen in cases of viral pneumonia such as COVID-19  Workstation performed: [id number]       EKG, Pathology, and Other Studies Reviewed on Admission:   · EKG: Sinus tachycardia     Epic / Care Everywhere Records Reviewed: Yes     ** Please Note: This note has been constructed using a voice recognition system   **

## 2021-01-23 PROBLEM — E11.9 TYPE 2 DIABETES MELLITUS (HCC): Status: ACTIVE | Noted: 2021-01-22

## 2021-01-23 LAB
ALBUMIN SERPL BCP-MCNC: 2.4 G/DL (ref 3.5–5)
ALP SERPL-CCNC: 131 U/L (ref 46–116)
ALT SERPL W P-5'-P-CCNC: 108 U/L (ref 12–78)
ANION GAP SERPL CALCULATED.3IONS-SCNC: 9 MMOL/L (ref 4–13)
AST SERPL W P-5'-P-CCNC: 83 U/L (ref 5–45)
ATRIAL RATE: 110 BPM
BASOPHILS # BLD MANUAL: 0 THOUSAND/UL (ref 0–0.1)
BASOPHILS NFR MAR MANUAL: 0 % (ref 0–1)
BILIRUB SERPL-MCNC: 0.37 MG/DL (ref 0.2–1)
BUN SERPL-MCNC: 11 MG/DL (ref 5–25)
CALCIUM ALBUM COR SERPL-MCNC: 10 MG/DL (ref 8.3–10.1)
CALCIUM SERPL-MCNC: 8.7 MG/DL (ref 8.3–10.1)
CHLORIDE SERPL-SCNC: 102 MMOL/L (ref 100–108)
CO2 SERPL-SCNC: 24 MMOL/L (ref 21–32)
CREAT SERPL-MCNC: 0.79 MG/DL (ref 0.6–1.3)
CRP SERPL QL: 370.4 MG/L
D DIMER PPP FEU-MCNC: 0.71 UG/ML FEU
EOSINOPHIL # BLD MANUAL: 0 THOUSAND/UL (ref 0–0.4)
EOSINOPHIL NFR BLD MANUAL: 0 % (ref 0–6)
ERYTHROCYTE [DISTWIDTH] IN BLOOD BY AUTOMATED COUNT: 14.1 % (ref 11.6–15.1)
EST. AVERAGE GLUCOSE BLD GHB EST-MCNC: 200 MG/DL
FERRITIN SERPL-MCNC: 2239 NG/ML (ref 8–388)
GFR SERPL CREATININE-BSD FRML MDRD: 94 ML/MIN/1.73SQ M
GLUCOSE SERPL-MCNC: 185 MG/DL (ref 65–140)
GLUCOSE SERPL-MCNC: 228 MG/DL (ref 65–140)
GLUCOSE SERPL-MCNC: 229 MG/DL (ref 65–140)
GLUCOSE SERPL-MCNC: 235 MG/DL (ref 65–140)
GLUCOSE SERPL-MCNC: 260 MG/DL (ref 65–140)
HBA1C MFR BLD: 8.6 %
HBV CORE AB SER QL: NORMAL
HBV CORE IGM SER QL: NORMAL
HBV SURFACE AG SER QL: NORMAL
HCT VFR BLD AUTO: 38.6 % (ref 36.5–49.3)
HCV AB SER QL: NORMAL
HGB BLD-MCNC: 12.4 G/DL (ref 12–17)
LYMPHOCYTES # BLD AUTO: 0.35 THOUSAND/UL (ref 0.6–4.47)
LYMPHOCYTES # BLD AUTO: 3 % (ref 14–44)
MCH RBC QN AUTO: 29.4 PG (ref 26.8–34.3)
MCHC RBC AUTO-ENTMCNC: 32.1 G/DL (ref 31.4–37.4)
MCV RBC AUTO: 92 FL (ref 82–98)
MONOCYTES # BLD AUTO: 0.24 THOUSAND/UL (ref 0–1.22)
MONOCYTES NFR BLD: 2 % (ref 4–12)
NEUTROPHILS # BLD MANUAL: 11.16 THOUSAND/UL (ref 1.85–7.62)
NEUTS BAND NFR BLD MANUAL: 7 % (ref 0–8)
NEUTS SEG NFR BLD AUTO: 88 % (ref 43–75)
NRBC BLD AUTO-RTO: 0 /100 WBCS
P AXIS: 36 DEGREES
PLATELET # BLD AUTO: 292 THOUSANDS/UL (ref 149–390)
PLATELET BLD QL SMEAR: ADEQUATE
PMV BLD AUTO: 10.7 FL (ref 8.9–12.7)
POTASSIUM SERPL-SCNC: 4.3 MMOL/L (ref 3.5–5.3)
PR INTERVAL: 128 MS
PROCALCITONIN SERPL-MCNC: 0.22 NG/ML
PROT SERPL-MCNC: 7.1 G/DL (ref 6.4–8.2)
QRS AXIS: 2 DEGREES
QRSD INTERVAL: 86 MS
QT INTERVAL: 308 MS
QTC INTERVAL: 416 MS
RBC # BLD AUTO: 4.22 MILLION/UL (ref 3.88–5.62)
RBC MORPH BLD: NORMAL
SODIUM SERPL-SCNC: 135 MMOL/L (ref 136–145)
T WAVE AXIS: 35 DEGREES
TOTAL CELLS COUNTED SPEC: 100
VENTRICULAR RATE: 110 BPM
WBC # BLD AUTO: 11.75 THOUSAND/UL (ref 4.31–10.16)

## 2021-01-23 PROCEDURE — 85379 FIBRIN DEGRADATION QUANT: CPT | Performed by: STUDENT IN AN ORGANIZED HEALTH CARE EDUCATION/TRAINING PROGRAM

## 2021-01-23 PROCEDURE — 86140 C-REACTIVE PROTEIN: CPT | Performed by: STUDENT IN AN ORGANIZED HEALTH CARE EDUCATION/TRAINING PROGRAM

## 2021-01-23 PROCEDURE — 93010 ELECTROCARDIOGRAM REPORT: CPT | Performed by: INTERNAL MEDICINE

## 2021-01-23 PROCEDURE — 80053 COMPREHEN METABOLIC PANEL: CPT | Performed by: STUDENT IN AN ORGANIZED HEALTH CARE EDUCATION/TRAINING PROGRAM

## 2021-01-23 PROCEDURE — 99232 SBSQ HOSP IP/OBS MODERATE 35: CPT | Performed by: INTERNAL MEDICINE

## 2021-01-23 PROCEDURE — 82948 REAGENT STRIP/BLOOD GLUCOSE: CPT

## 2021-01-23 PROCEDURE — 85007 BL SMEAR W/DIFF WBC COUNT: CPT | Performed by: STUDENT IN AN ORGANIZED HEALTH CARE EDUCATION/TRAINING PROGRAM

## 2021-01-23 PROCEDURE — 85027 COMPLETE CBC AUTOMATED: CPT | Performed by: STUDENT IN AN ORGANIZED HEALTH CARE EDUCATION/TRAINING PROGRAM

## 2021-01-23 PROCEDURE — 84145 PROCALCITONIN (PCT): CPT | Performed by: EMERGENCY MEDICINE

## 2021-01-23 PROCEDURE — 82728 ASSAY OF FERRITIN: CPT | Performed by: STUDENT IN AN ORGANIZED HEALTH CARE EDUCATION/TRAINING PROGRAM

## 2021-01-23 RX ORDER — BENZONATATE 100 MG/1
100 CAPSULE ORAL 3 TIMES DAILY PRN
Status: DISCONTINUED | OUTPATIENT
Start: 2021-01-23 | End: 2021-01-23

## 2021-01-23 RX ORDER — BENZONATATE 100 MG/1
100 CAPSULE ORAL 3 TIMES DAILY
Status: DISCONTINUED | OUTPATIENT
Start: 2021-01-23 | End: 2021-01-26 | Stop reason: HOSPADM

## 2021-01-23 RX ADMIN — SODIUM CHLORIDE 100 ML/HR: 0.9 INJECTION, SOLUTION INTRAVENOUS at 17:17

## 2021-01-23 RX ADMIN — INSULIN LISPRO 2 UNITS: 100 INJECTION, SOLUTION INTRAVENOUS; SUBCUTANEOUS at 17:12

## 2021-01-23 RX ADMIN — FAMOTIDINE 20 MG: 20 TABLET ORAL at 10:11

## 2021-01-23 RX ADMIN — DEXAMETHASONE SODIUM PHOSPHATE 6 MG: 4 INJECTION INTRA-ARTICULAR; INTRALESIONAL; INTRAMUSCULAR; INTRAVENOUS; SOFT TISSUE at 21:26

## 2021-01-23 RX ADMIN — INSULIN LISPRO 3 UNITS: 100 INJECTION, SOLUTION INTRAVENOUS; SUBCUTANEOUS at 13:51

## 2021-01-23 RX ADMIN — CEFTRIAXONE SODIUM 1000 MG: 10 INJECTION, POWDER, FOR SOLUTION INTRAVENOUS at 16:12

## 2021-01-23 RX ADMIN — BENZONATATE 100 MG: 100 CAPSULE ORAL at 10:13

## 2021-01-23 RX ADMIN — REMDESIVIR 100 MG: 100 INJECTION, POWDER, LYOPHILIZED, FOR SOLUTION INTRAVENOUS at 21:38

## 2021-01-23 RX ADMIN — MELATONIN 6 MG: at 22:28

## 2021-01-23 RX ADMIN — FAMOTIDINE 20 MG: 20 TABLET ORAL at 21:27

## 2021-01-23 RX ADMIN — ATORVASTATIN CALCIUM 40 MG: 40 TABLET, FILM COATED ORAL at 22:28

## 2021-01-23 RX ADMIN — BENZONATATE 100 MG: 100 CAPSULE ORAL at 16:12

## 2021-01-23 RX ADMIN — Medication 2000 UNITS: at 10:12

## 2021-01-23 RX ADMIN — DOXYCYCLINE 100 MG: 100 CAPSULE ORAL at 21:27

## 2021-01-23 RX ADMIN — BENZONATATE 100 MG: 100 CAPSULE ORAL at 21:26

## 2021-01-23 RX ADMIN — OXYCODONE HYDROCHLORIDE AND ACETAMINOPHEN 1000 MG: 500 TABLET ORAL at 10:11

## 2021-01-23 RX ADMIN — ENOXAPARIN SODIUM 30 MG: 30 INJECTION SUBCUTANEOUS at 21:27

## 2021-01-23 RX ADMIN — INSULIN LISPRO 2 UNITS: 100 INJECTION, SOLUTION INTRAVENOUS; SUBCUTANEOUS at 10:18

## 2021-01-23 RX ADMIN — OXYCODONE HYDROCHLORIDE AND ACETAMINOPHEN 1000 MG: 500 TABLET ORAL at 21:26

## 2021-01-23 RX ADMIN — DOXYCYCLINE 100 MG: 100 CAPSULE ORAL at 10:10

## 2021-01-23 RX ADMIN — ENOXAPARIN SODIUM 30 MG: 30 INJECTION SUBCUTANEOUS at 10:15

## 2021-01-23 RX ADMIN — ZINC SULFATE 220 MG (50 MG) CAPSULE 220 MG: CAPSULE at 10:12

## 2021-01-23 NOTE — ASSESSMENT & PLAN NOTE
· Meets SIRS criteria with tachypnia, tachycardia, and leukocytosis POA w/ likely source as COVID pneumonia  Still meets sepsis criteria w/ tachypnia, tachycardia, leukocytosis  WBCs increased however suspect remain elevated due to steroids     · Continue to monitor vitals

## 2021-01-23 NOTE — ASSESSMENT & PLAN NOTE
Lab Results   Component Value Date    SARSCOV2 Positive (A) 2021       Patient presenting with symptoms of COVID-19 SYMPTOMS: shortness of breath  Patient experiencing worsening SOB over the past 2-3 days with subjective fevers, chills, and non-productive cough  Workup:  · Imaging:  Xr Chest 1 View Portable    Result Date: 2021  Impression: Bilateral airspace opacities  In the setting of clinically suspected/proven COVID-19, this plain film appearance while nonspecific, can be seen in cases of viral pneumonia such as COVID-19  Workstation performed: [id number]       · Labs:     Recent Labs     21  1439 21  1441 21  0543   FERRITIN  --  1,806*  --    DDIMER 0 65*  --  0 71*     D-Dimer elevated however WNL when correcting for age   Ferritin elevated   CRP elevated >90  BNP + Troponin WNL  Procal -X1    Recent Labs     21  1452   PROCALCITONI 0 15       · Oxygen:  Saturating 96%% on 4 liters/min via nasal cannula    COVID Stage: MODERATE    Plan:  · Trend procalcitonin  · Consider d/c Abx if negative x 2  · Meds  · Ceftriaxone 1 g IV qd  Day #2  · Doxycycline 100 mg PO Q12h  Day #2  · Vitamin D3 2000 units daily  · Vitamin C 1000 BID  · Zinc 220 mg x 7 days then multivitamin qd  · Famotidine 20 mg PO b i d  for acid suppressive therapy  · Remdesevir 200 mg IV 1 day, then 100 mg IV daily x4 days  Day #2  · Dexamethasone 6 mg IV daily times 10 days  Day #2    · Atorvastatin 40 mg PO HS    · Anticoamg lovonox BID  · Non-pharmacologic interventions  · PT and OT eval and treat  · Self-proning if able  · Incentive spirometry  · Consult Pulm/CC if worsening oxygen requirements

## 2021-01-23 NOTE — PROGRESS NOTES
Progress Note - Kevin Sensing 1955, 76 y o  male MRN: [id number]    Unit/Bed#: S -01 Encounter: [id number]    Primary Care Provider: Luh Harrison DO   Date and time admitted to hospital: 1/22/2021  1:50 PM        Type 2 diabetes mellitus ([facility] [address] )  Assessment & Plan  · 204 POA  No prior history of diagnosed DM  · Hemoglobin A1c 8 6  based on this finding newly diagnosed DM Type II  · Accuchecks + SSI considering the patient is likely diabetic and will be started on steroids  · Upon discharge recommend lifestyle management vs starting metformin     Hyponatremia  Assessment & Plan  · 131 POA  133 when correcting for hyperglycemia  · 1000cc bolus NS in the ED for sepsis criteria   · Will continue to hydrate the patient with 100cc/hr NS  · Repeat BMP in the AM to monitor electrolytes     Sepsis ([facility] )  Assessment & Plan  · Meets SIRS criteria with tachypnia, tachycardia, and leukocytosis POA w/ likely source as COVID pneumonia  Still meets sepsis criteria w/ tachypnia, tachycardia, leukocytosis  WBCs increased however suspect remain elevated due to steroids  · Continue to monitor vitals     * Pneumonia due to COVID-19 virus  Assessment & Plan  Lab Results   Component Value Date    SARSCOV2 Positive (A) 01/22/2021       Patient presenting with symptoms of COVID-19 SYMPTOMS: shortness of breath  Patient experiencing worsening SOB over the past 2-3 days with subjective fevers, chills, and non-productive cough  Workup:  · Imaging:  Xr Chest 1 View Portable    Result Date: 1/22/2021  Impression: Bilateral airspace opacities  In the setting of clinically suspected/proven COVID-19, this plain film appearance while nonspecific, can be seen in cases of viral pneumonia such as COVID-19   Workstation performed: [id number]       · Labs:     Recent Labs     01/22/21  1439 01/22/21  1441 01/23/21  0543   FERRITIN  --  1,806*  --    DDIMER 0 65*  --  0 71*     D-Dimer elevated however WNL when correcting for age Ferritin elevated   CRP elevated >90  BNP + Troponin WNL  Procal -X1    Recent Labs     21  1452   PROCALCITONI 0 15       · Oxygen:  Saturating 96%% on 4 liters/min via nasal cannula    COVID Stage: MODERATE    Plan:  · Trend procalcitonin  · Consider d/c Abx if negative x 2  · Meds  · Ceftriaxone 1 g IV qd  Day #2  · Doxycycline 100 mg PO Q12h  Day #2  · Vitamin D3 2000 units daily  · Vitamin C 1000 BID  · Zinc 220 mg x 7 days then multivitamin qd  · Famotidine 20 mg PO b i d  for acid suppressive therapy  · Remdesevir 200 mg IV 1 day, then 100 mg IV daily x4 days  Day #2  · Dexamethasone 6 mg IV daily times 10 days  Day #2  · Atorvastatin 40 mg PO HS    · Anticoamg lovonox BID  · Non-pharmacologic interventions  · PT and OT eval and treat  · Self-proning if able  · Incentive spirometry  · Consult Pulm/CC if worsening oxygen requirements          VTE Pharmacologic Prophylaxis:   Pharmacologic: Enoxaparin (Lovenox)  Mechanical VTE Prophylaxis in Place: Yes    Discussions with Specialists or Other Care Team Provider: None    Education and Discussions with Family / Patient: Plan of care discussed with patient and family at length over the phone  Current Length of Stay: 1 day(s)    Current Patient Status: Inpatient     Discharge Plan / Estimated Discharge Date: -    Code Status: Level 1 - Full Code      Subjective:   Patient states that he feels that he can breathe easier  Overall feels much better than when he came in  Still complains of cough  Admits that his appetite is improving this morning  Objective:     Vitals:   Temp (24hrs), Av 9 °F (37 7 °C), Min:97 7 °F (36 5 °C), Max:101 5 °F (38 6 °C)    Temp:  [97 7 °F (36 5 °C)-101 5 °F (38 6 °C)] 99 7 °F (37 6 °C)  HR:  [] 100  Resp:  [22-26] 24  BP: (120-152)/(58-72) 152/72  SpO2:  [85 %-96 %] 96 %  Body mass index is 32 72 kg/m²  Input and Output Summary (last 24 hours):        Intake/Output Summary (Last 24 hours) at 1/23/2021 0759  Last data filed at 1/22/2021 1656  Gross per 24 hour   Intake 1126 67 ml   Output    Net 1126 67 ml       Physical Exam:     Physical Exam  Constitutional:       General: He is not in acute distress  Appearance: He is ill-appearing  He is not toxic-appearing  HENT:      Head: Normocephalic and atraumatic  Mouth/Throat:      Mouth: Mucous membranes are moist       Pharynx: Oropharynx is clear  Eyes:      Extraocular Movements: Extraocular movements intact  Pupils: Pupils are equal, round, and reactive to light  Neck:      Musculoskeletal: Normal range of motion and neck supple  Cardiovascular:      Rate and Rhythm: Regular rhythm  Tachycardia present  Heart sounds: No murmur  Pulmonary:      Breath sounds: Rales present  No wheezing  Comments: Tachypnic  Rales in lower lung fields  Noted improvement from yesterday's examination  Abdominal:      General: Abdomen is flat  Palpations: Abdomen is soft  Tenderness: There is no abdominal tenderness  There is no guarding  Musculoskeletal: Normal range of motion  Right lower leg: No edema  Left lower leg: No edema  Skin:     General: Skin is warm and dry  Neurological:      General: No focal deficit present  Mental Status: He is alert and oriented to person, place, and time  Psychiatric:         Mood and Affect: Mood normal          Thought Content:  Thought content normal          Judgment: Judgment normal          Additional Data:     Labs:    Results from last 7 days   Lab Units 01/23/21  0543 01/22/21  1441   WBC Thousand/uL 11 75* 10 44*   HEMOGLOBIN g/dL 12 4 13 4   HEMATOCRIT % 38 6 39 7   PLATELETS Thousands/uL 292 330   NEUTROS PCT %  --  90*   LYMPHS PCT %  --  5*   MONOS PCT %  --  4   EOS PCT %  --  0     Results from last 7 days   Lab Units 01/22/21  1441   POTASSIUM mmol/L 3 5   CHLORIDE mmol/L 95*   CO2 mmol/L 28   BUN mg/dL 11   CREATININE mg/dL 0 93   CALCIUM mg/dL 8 9   ALK PHOS U/L 121*   ALT U/L 76   AST U/L 65*     Results from last 7 days   Lab Units 01/22/21  1439   INR  1 04       * I Have Reviewed All Lab Data Listed Above  * Additional Pertinent Lab Tests Reviewed: All Labs Within Last 24 Hours Reviewed    Imaging:    Imaging Reports Reviewed Today Include: None  Imaging Personally Reviewed by Myself Includes:  Nonee    Recent Cultures (last 7 days):     Results from last 7 days   Lab Units 01/22/21  1452 01/22/21  1439   BLOOD CULTURE  Received in Microbiology Lab  Culture in Progress  Received in Microbiology Lab  Culture in Progress  Last 24 Hours Medication List:   Current Facility-Administered Medications   Medication Dose Route Frequency Provider Last Rate    acetaminophen  650 mg Oral Q6H PRN Regina Glynn DO      ascorbic acid  1,000 mg Oral Q12H [address] Regina Glynn DO      atorvastatin  40 mg Oral HS Regina Glynn,       benzonatate  100 mg Oral TID Regina Glynn DO      cefTRIAXone  1,000 mg Intravenous Q24H Regina Glynn, DO      cholecalciferol  2,000 Units Oral Daily Regina Glynn DO      dexamethasone  6 mg Intravenous Q24H Regina Glynn, DO      doxycycline hyclate  100 mg Oral Q12H Regina Glynn, DO      enoxaparin  30 mg Subcutaneous Q12H [address] NEVAEH Munoz      famotidine  20 mg Oral Q12H [address] Regina Glynn DO      insulin lispro  1-6 Units Subcutaneous TID [facility] Regina Glynn DO      melatonin  6 mg Oral HS Regina Glynn DO      zinc sulfate  220 mg Oral Daily Regina Glynn DO      Followed by   Vanessa Jeff ON 1/30/2021] multivitamin-minerals  1 tablet Oral Daily Regina Glynn DO      ondansetron  4 mg Intravenous Q4H PRN Regina Glnyn, DO      remdesivir  100 mg Intravenous Q24H Regina Glynn,       sodium chloride  100 mL/hr Intravenous Continuous Regina Glynn  mL/hr (01/22/21 1855)        Today, Patient Was Seen By: Pool Oliveira DO    ** Please Note: This note has been constructed using a voice recognition system  **

## 2021-01-23 NOTE — ASSESSMENT & PLAN NOTE
· 204 POA  No prior history of diagnosed DM  · Hemoglobin A1c 8 6  based on this finding newly diagnosed DM Type II    · Accuchecks + SSI considering the patient is likely diabetic and will be started on steroids  · Upon discharge recommend lifestyle management vs starting metformin

## 2021-01-23 NOTE — PLAN OF CARE
Problem: RESPIRATORY - ADULT  Goal: Achieves optimal ventilation and oxygenation  Description: INTERVENTIONS:  - Assess for changes in respiratory status  - Assess for changes in mentation and behavior  - Position to facilitate oxygenation and minimize respiratory effort  - Oxygen administered by appropriate delivery if ordered  - Initiate smoking cessation education as indicated  - Encourage broncho-pulmonary hygiene including cough, deep breathe, Incentive Spirometry  - Assess the need for suctioning and aspirate as needed  - Assess and instruct to report SOB or any respiratory difficulty  - Respiratory Therapy support as indicated  Outcome: Progressing     Problem: GASTROINTESTINAL - ADULT  Goal: Minimal or absence of nausea and/or vomiting  Description: INTERVENTIONS:  - Administer IV fluids if ordered to ensure adequate hydration  - Maintain NPO status until nausea and vomiting are resolved  - Nasogastric tube if ordered  - Administer ordered antiemetic medications as needed  - Provide nonpharmacologic comfort measures as appropriate  - Advance diet as tolerated, if ordered  - Consider nutrition services referral to assist patient with adequate nutrition and appropriate food choices  Outcome: Progressing  Goal: Maintains adequate nutritional intake  Description: INTERVENTIONS:  - Monitor percentage of each meal consumed  - Identify factors contributing to decreased intake, treat as appropriate  - Assist with meals as needed  - Monitor I&O, weight, and lab values if indicated  - Obtain nutrition services referral as needed  Outcome: Progressing     Problem: SAFETY ADULT  Goal: Patient will remain free of falls  Description: INTERVENTIONS:  - Assess patient frequently for physical needs  -  Identify cognitive and physical deficits and behaviors that affect risk of falls    -  [place] fall precautions as indicated by assessment   - Educate patient/family on patient safety including physical limitations  - Instruct patient to call for assistance with activity based on assessment  - Modify environment to reduce risk of injury  - Consider OT/PT consult to assist with strengthening/mobility  Outcome: Progressing  Goal: Maintain or return to baseline ADL function  Description: INTERVENTIONS:  -  Assess patient's ability to carry out ADLs; assess patient's baseline for ADL function and identify physical deficits which impact ability to perform ADLs (bathing, care of mouth/teeth, toileting, grooming, dressing, etc )  - Assess/evaluate cause of self-care deficits   - Assess range of motion  - Assess patient's mobility; develop plan if impaired  - Assess patient's need for assistive devices and provide as appropriate  - Encourage maximum independence but intervene and supervise when necessary  - Involve family in performance of ADLs  - Assess for home care needs following discharge   - Consider OT consult to assist with ADL evaluation and planning for discharge  - Provide patient education as appropriate  Outcome: Progressing  Goal: Maintain or return mobility status to optimal level  Description: INTERVENTIONS:  - Assess patient's baseline mobility status (ambulation, transfers, stairs, etc )    - Identify cognitive and physical deficits and behaviors that affect mobility  - Identify mobility aids required to assist with transfers and/or ambulation (gait belt, sit-to-stand, lift, walker, cane, etc )  - [place] fall precautions as indicated by assessment  - Record patient progress and toleration of activity level on Mobility SBAR; progress patient to next Phase/Stage  - Instruct patient to call for assistance with activity based on assessment  - Consider rehabilitation consult to assist with strengthening/weightbearing, etc   Outcome: Progressing     Problem: DISCHARGE PLANNING  Goal: Discharge to home or other facility with appropriate resources  Description: INTERVENTIONS:  - Identify barriers to discharge w/patient and caregiver  - Arrange for needed discharge resources and transportation as appropriate  - Identify discharge learning needs (meds, wound care, etc )  - Arrange for interpretive services to assist at discharge as needed  - Refer to Case Management Department for coordinating discharge planning if the patient needs post-hospital services based on physician/advanced practitioner order or complex needs related to functional status, cognitive ability, or social support system  Outcome: Progressing     Problem: DISCHARGE PLANNING  Goal: Discharge to home or other facility with appropriate resources  Description: INTERVENTIONS:  - Identify barriers to discharge w/patient and caregiver  - Arrange for needed discharge resources and transportation as appropriate  - Identify discharge learning needs (meds, wound care, etc )  - Arrange for interpretive services to assist at discharge as needed  - Refer to Case Management Department for coordinating discharge planning if the patient needs post-hospital services based on physician/advanced practitioner order or complex needs related to functional status, cognitive ability, or social support system  Outcome: Progressing     Problem: Knowledge Deficit  Goal: Patient/family/caregiver demonstrates understanding of disease process, treatment plan, medications, and discharge instructions  Description: Complete learning assessment and assess knowledge base    Interventions:  - Provide teaching at level of understanding  - Provide teaching via preferred learning methods  Outcome: Progressing

## 2021-01-24 LAB
ALBUMIN SERPL BCP-MCNC: 2.3 G/DL (ref 3.5–5)
ALP SERPL-CCNC: 116 U/L (ref 46–116)
ALT SERPL W P-5'-P-CCNC: 86 U/L (ref 12–78)
ANION GAP SERPL CALCULATED.3IONS-SCNC: 10 MMOL/L (ref 4–13)
AST SERPL W P-5'-P-CCNC: 49 U/L (ref 5–45)
BASOPHILS # BLD MANUAL: 0 THOUSAND/UL (ref 0–0.1)
BASOPHILS NFR MAR MANUAL: 0 % (ref 0–1)
BILIRUB SERPL-MCNC: 0.26 MG/DL (ref 0.2–1)
BUN SERPL-MCNC: 14 MG/DL (ref 5–25)
CALCIUM ALBUM COR SERPL-MCNC: 9.9 MG/DL (ref 8.3–10.1)
CALCIUM SERPL-MCNC: 8.5 MG/DL (ref 8.3–10.1)
CHLORIDE SERPL-SCNC: 101 MMOL/L (ref 100–108)
CO2 SERPL-SCNC: 25 MMOL/L (ref 21–32)
CREAT SERPL-MCNC: 0.74 MG/DL (ref 0.6–1.3)
EOSINOPHIL # BLD MANUAL: 0 THOUSAND/UL (ref 0–0.4)
EOSINOPHIL NFR BLD MANUAL: 0 % (ref 0–6)
ERYTHROCYTE [DISTWIDTH] IN BLOOD BY AUTOMATED COUNT: 14.2 % (ref 11.6–15.1)
GFR SERPL CREATININE-BSD FRML MDRD: 96 ML/MIN/1.73SQ M
GLUCOSE SERPL-MCNC: 175 MG/DL (ref 65–140)
GLUCOSE SERPL-MCNC: 194 MG/DL (ref 65–140)
GLUCOSE SERPL-MCNC: 197 MG/DL (ref 65–140)
GLUCOSE SERPL-MCNC: 216 MG/DL (ref 65–140)
GLUCOSE SERPL-MCNC: 260 MG/DL (ref 65–140)
HCT VFR BLD AUTO: 38.2 % (ref 36.5–49.3)
HGB BLD-MCNC: 12.7 G/DL (ref 12–17)
LYMPHOCYTES # BLD AUTO: 0.55 THOUSAND/UL (ref 0.6–4.47)
LYMPHOCYTES # BLD AUTO: 5 % (ref 14–44)
MCH RBC QN AUTO: 29.8 PG (ref 26.8–34.3)
MCHC RBC AUTO-ENTMCNC: 33.2 G/DL (ref 31.4–37.4)
MCV RBC AUTO: 90 FL (ref 82–98)
MONOCYTES # BLD AUTO: 0.88 THOUSAND/UL (ref 0–1.22)
MONOCYTES NFR BLD: 8 % (ref 4–12)
NEUTROPHILS # BLD MANUAL: 9.54 THOUSAND/UL (ref 1.85–7.62)
NEUTS BAND NFR BLD MANUAL: 4 % (ref 0–8)
NEUTS SEG NFR BLD AUTO: 83 % (ref 43–75)
NRBC BLD AUTO-RTO: 0 /100 WBCS
PLATELET # BLD AUTO: 393 THOUSANDS/UL (ref 149–390)
PLATELET BLD QL SMEAR: ADEQUATE
PMV BLD AUTO: 10 FL (ref 8.9–12.7)
POTASSIUM SERPL-SCNC: 4.2 MMOL/L (ref 3.5–5.3)
PROT SERPL-MCNC: 6.8 G/DL (ref 6.4–8.2)
RBC # BLD AUTO: 4.26 MILLION/UL (ref 3.88–5.62)
RBC MORPH BLD: NORMAL
SODIUM SERPL-SCNC: 136 MMOL/L (ref 136–145)
TOTAL CELLS COUNTED SPEC: 100
WBC # BLD AUTO: 10.97 THOUSAND/UL (ref 4.31–10.16)

## 2021-01-24 PROCEDURE — 85007 BL SMEAR W/DIFF WBC COUNT: CPT | Performed by: PHYSICIAN ASSISTANT

## 2021-01-24 PROCEDURE — 80053 COMPREHEN METABOLIC PANEL: CPT | Performed by: PHYSICIAN ASSISTANT

## 2021-01-24 PROCEDURE — 82948 REAGENT STRIP/BLOOD GLUCOSE: CPT

## 2021-01-24 PROCEDURE — 85027 COMPLETE CBC AUTOMATED: CPT | Performed by: PHYSICIAN ASSISTANT

## 2021-01-24 PROCEDURE — 99232 SBSQ HOSP IP/OBS MODERATE 35: CPT | Performed by: INTERNAL MEDICINE

## 2021-01-24 RX ADMIN — REMDESIVIR 100 MG: 100 INJECTION, POWDER, LYOPHILIZED, FOR SOLUTION INTRAVENOUS at 20:56

## 2021-01-24 RX ADMIN — ENOXAPARIN SODIUM 30 MG: 30 INJECTION SUBCUTANEOUS at 21:09

## 2021-01-24 RX ADMIN — Medication 2000 UNITS: at 08:36

## 2021-01-24 RX ADMIN — BENZONATATE 100 MG: 100 CAPSULE ORAL at 20:58

## 2021-01-24 RX ADMIN — OXYCODONE HYDROCHLORIDE AND ACETAMINOPHEN 1000 MG: 500 TABLET ORAL at 08:36

## 2021-01-24 RX ADMIN — ENOXAPARIN SODIUM 30 MG: 30 INJECTION SUBCUTANEOUS at 08:38

## 2021-01-24 RX ADMIN — OXYCODONE HYDROCHLORIDE AND ACETAMINOPHEN 1000 MG: 500 TABLET ORAL at 20:58

## 2021-01-24 RX ADMIN — INSULIN LISPRO 2 UNITS: 100 INJECTION, SOLUTION INTRAVENOUS; SUBCUTANEOUS at 08:39

## 2021-01-24 RX ADMIN — DEXAMETHASONE SODIUM PHOSPHATE 6 MG: 4 INJECTION INTRA-ARTICULAR; INTRALESIONAL; INTRAMUSCULAR; INTRAVENOUS; SOFT TISSUE at 20:57

## 2021-01-24 RX ADMIN — DOXYCYCLINE 100 MG: 100 CAPSULE ORAL at 08:36

## 2021-01-24 RX ADMIN — FAMOTIDINE 20 MG: 20 TABLET ORAL at 08:36

## 2021-01-24 RX ADMIN — BENZONATATE 100 MG: 100 CAPSULE ORAL at 17:17

## 2021-01-24 RX ADMIN — MELATONIN 6 MG: at 21:58

## 2021-01-24 RX ADMIN — ATORVASTATIN CALCIUM 40 MG: 40 TABLET, FILM COATED ORAL at 21:58

## 2021-01-24 RX ADMIN — FAMOTIDINE 20 MG: 20 TABLET ORAL at 20:58

## 2021-01-24 RX ADMIN — ZINC SULFATE 220 MG (50 MG) CAPSULE 220 MG: CAPSULE at 08:36

## 2021-01-24 RX ADMIN — BENZONATATE 100 MG: 100 CAPSULE ORAL at 08:36

## 2021-01-24 RX ADMIN — INSULIN LISPRO 2 UNITS: 100 INJECTION, SOLUTION INTRAVENOUS; SUBCUTANEOUS at 17:17

## 2021-01-24 RX ADMIN — INSULIN LISPRO 2 UNITS: 100 INJECTION, SOLUTION INTRAVENOUS; SUBCUTANEOUS at 11:32

## 2021-01-24 NOTE — ASSESSMENT & PLAN NOTE
Lab Results   Component Value Date    SARSCOV2 Positive (A) 2021       Patient presenting with symptoms of COVID-19 SYMPTOMS: shortness of breath  Patient experiencing worsening SOB over the past 2-3 days with subjective fevers, chills, and non-productive cough  Workup:  · Imaging:  Xr Chest 1 View Portable    Result Date: 2021  Impression: Bilateral airspace opacities  In the setting of clinically suspected/proven COVID-19, this plain film appearance while nonspecific, can be seen in cases of viral pneumonia such as COVID-19  Workstation performed: [id number]       · Labs:     Recent Labs     21  1439 21  1441 21  0543   FERRITIN  --  1,806* 2,239*   CRP  --   --  370 4*   DDIMER 0 65*  --  0 71*     D-Dimer elevated however WNL when correcting for age   Ferritin elevated   CRP elevated >90  BNP + Troponin WNL  Procal -X2    Recent Labs     21  1452 21  0543   PROCALCITONI 0 15 0 22       · Oxygen:  Saturating 96%% on 4 liters/min via nasal cannula    COVID Stage: MODERATE    Plan:  · Discontinued antibiotics  · Meds  · Vitamin D3 2000 units daily  · Vitamin C 1000 BID  · Zinc 220 mg x 7 days then multivitamin qd  · Famotidine 20 mg PO b i d  for acid suppressive therapy  · Remdesevir 200 mg IV 1 day, then 100 mg IV daily x4 days  Day #2  · Dexamethasone 6 mg IV daily times 10 days  Day #2    · Atorvastatin 40 mg PO HS    · Anticoamg lovonox BID  · Non-pharmacologic interventions  · PT and OT eval and treat  · Self-proning if able  · Incentive spirometry  · Consult Pulm/CC if worsening oxygen requirements

## 2021-01-24 NOTE — ASSESSMENT & PLAN NOTE
Lab Results   Component Value Date    HGBA1C 8 6 (H) 01/22/2021     · 204 POA  No prior history of diagnosed DM  · Hemoglobin A1c 8 6  based on this finding newly diagnosed DM Type II      Plan:  · Accuchecks + SSI considering the patient is likely diabetic and will be started on steroids  · Upon discharge recommend lifestyle management vs starting metformin

## 2021-01-24 NOTE — ASSESSMENT & PLAN NOTE
· Meets SIRS criteria with tachypnia, tachycardia, and leukocytosis POA w/ likely source as COVID pneumonia  Still meets sepsis criteria w/ tachypnia, tachycardia, leukocytosis  WBCs increased however suspect remain elevated due to steroids       Plan:  · Continue to monitor vitals

## 2021-01-24 NOTE — PLAN OF CARE
Problem: RESPIRATORY - ADULT  Goal: Achieves optimal ventilation and oxygenation  Description: INTERVENTIONS:  - Assess for changes in respiratory status  - Assess for changes in mentation and behavior  - Position to facilitate oxygenation and minimize respiratory effort  - Oxygen administered by appropriate delivery if ordered  - Initiate smoking cessation education as indicated  - Encourage broncho-pulmonary hygiene including cough, deep breathe, Incentive Spirometry  - Assess the need for suctioning and aspirate as needed  - Assess and instruct to report SOB or any respiratory difficulty  - Respiratory Therapy support as indicated  Outcome: Progressing     Problem: GASTROINTESTINAL - ADULT  Goal: Minimal or absence of nausea and/or vomiting  Description: INTERVENTIONS:  - Administer IV fluids if ordered to ensure adequate hydration  - Maintain NPO status until nausea and vomiting are resolved  - Nasogastric tube if ordered  - Administer ordered antiemetic medications as needed  - Provide nonpharmacologic comfort measures as appropriate  - Advance diet as tolerated, if ordered  - Consider nutrition services referral to assist patient with adequate nutrition and appropriate food choices  Outcome: Progressing  Goal: Maintains adequate nutritional intake  Description: INTERVENTIONS:  - Monitor percentage of each meal consumed  - Identify factors contributing to decreased intake, treat as appropriate  - Assist with meals as needed  - Monitor I&O, weight, and lab values if indicated  - Obtain nutrition services referral as needed  Outcome: Progressing     Problem: SAFETY ADULT  Goal: Patient will remain free of falls  Description: INTERVENTIONS:  - Assess patient frequently for physical needs  -  Identify cognitive and physical deficits and behaviors that affect risk of falls    -  [place] fall precautions as indicated by assessment   - Educate patient/family on patient safety including physical limitations  - Instruct patient to call for assistance with activity based on assessment  - Modify environment to reduce risk of injury  - Consider OT/PT consult to assist with strengthening/mobility  Outcome: Progressing  Goal: Maintain or return to baseline ADL function  Description: INTERVENTIONS:  -  Assess patient's ability to carry out ADLs; assess patient's baseline for ADL function and identify physical deficits which impact ability to perform ADLs (bathing, care of mouth/teeth, toileting, grooming, dressing, etc )  - Assess/evaluate cause of self-care deficits   - Assess range of motion  - Assess patient's mobility; develop plan if impaired  - Assess patient's need for assistive devices and provide as appropriate  - Encourage maximum independence but intervene and supervise when necessary  - Involve family in performance of ADLs  - Assess for home care needs following discharge   - Consider OT consult to assist with ADL evaluation and planning for discharge  - Provide patient education as appropriate  Outcome: Progressing  Goal: Maintain or return mobility status to optimal level  Description: INTERVENTIONS:  - Assess patient's baseline mobility status (ambulation, transfers, stairs, etc )    - Identify cognitive and physical deficits and behaviors that affect mobility  - Identify mobility aids required to assist with transfers and/or ambulation (gait belt, sit-to-stand, lift, walker, cane, etc )  - [place] fall precautions as indicated by assessment  - Record patient progress and toleration of activity level on Mobility SBAR; progress patient to next Phase/Stage  - Instruct patient to call for assistance with activity based on assessment  - Consider rehabilitation consult to assist with strengthening/weightbearing, etc   Outcome: Progressing     Problem: DISCHARGE PLANNING  Goal: Discharge to home or other facility with appropriate resources  Description: INTERVENTIONS:  - Identify barriers to discharge w/patient and caregiver  - Arrange for needed discharge resources and transportation as appropriate  - Identify discharge learning needs (meds, wound care, etc )  - Arrange for interpretive services to assist at discharge as needed  - Refer to Case Management Department for coordinating discharge planning if the patient needs post-hospital services based on physician/advanced practitioner order or complex needs related to functional status, cognitive ability, or social support system  Outcome: Progressing     Problem: DISCHARGE PLANNING  Goal: Discharge to home or other facility with appropriate resources  Description: INTERVENTIONS:  - Identify barriers to discharge w/patient and caregiver  - Arrange for needed discharge resources and transportation as appropriate  - Identify discharge learning needs (meds, wound care, etc )  - Arrange for interpretive services to assist at discharge as needed  - Refer to Case Management Department for coordinating discharge planning if the patient needs post-hospital services based on physician/advanced practitioner order or complex needs related to functional status, cognitive ability, or social support system  Outcome: Progressing     Problem: Knowledge Deficit  Goal: Patient/family/caregiver demonstrates understanding of disease process, treatment plan, medications, and discharge instructions  Description: Complete learning assessment and assess knowledge base  Interventions:  - Provide teaching at level of understanding  - Provide teaching via preferred learning methods  Outcome: Progressing     Problem: Potential for Falls  Goal: Patient will remain free of falls  Description: INTERVENTIONS:  - Assess patient frequently for physical needs  -  Identify cognitive and physical deficits and behaviors that affect risk of falls    -  [place] fall precautions as indicated by assessment   - Educate patient/family on patient safety including physical limitations  - Instruct patient to call for assistance with activity based on assessment  - Modify environment to reduce risk of injury  - Consider OT/PT consult to assist with strengthening/mobility  Outcome: Progressing     Problem: Nutrition/Hydration-ADULT  Goal: Nutrient/Hydration intake appropriate for improving, restoring or maintaining nutritional needs  Description: Monitor and assess patient's nutrition/hydration status for malnutrition  Collaborate with interdisciplinary team and initiate plan and interventions as ordered  Monitor patient's weight and dietary intake as ordered or per policy  Utilize nutrition screening tool and intervene as necessary  Determine patient's food preferences and provide high-protein, high-caloric foods as appropriate       INTERVENTIONS:  - Monitor oral intake, urinary output, labs, and treatment plans  - Assess nutrition and hydration status and recommend course of action  - Evaluate amount of meals eaten  - Assist patient with eating if necessary   - Allow adequate time for meals  - Recommend/ encourage appropriate diets, oral nutritional supplements, and vitamin/mineral supplements  - Order, calculate, and assess calorie counts as needed  - Recommend, monitor, and adjust tube feedings and TPN/PPN based on assessed needs  - Assess need for intravenous fluids  - Provide specific nutrition/hydration education as appropriate  - Include patient/family/caregiver in decisions related to nutrition  Outcome: Progressing

## 2021-01-24 NOTE — ASSESSMENT & PLAN NOTE
Recent Labs     01/22/21  1441 01/23/21  0543   SODIUM 131* 135*     · 131 POA  133 when correcting for hyperglycemia  · 1000cc bolus NS in the ED for sepsis criteria      Plan:  · Discontinue IVF  Encourage oral hydration    · Repeat BMP in the AM to monitor electrolytes

## 2021-01-24 NOTE — PROGRESS NOTES
Progress Note - Aliza Forrest 1955, 76 y o  male MRN: [id number]    Unit/Bed#: S -01 Encounter: [id number]    Primary Care Provider: Herbert Garcia DO   Date and time admitted to hospital: 2021  1:50 PM      * Pneumonia due to COVID-19 virus  Assessment & Plan  Lab Results   Component Value Date    SARSCOV2 Positive (A) 2021       Patient presenting with symptoms of COVID-19 SYMPTOMS: shortness of breath  Patient experiencing worsening SOB over the past 2-3 days with subjective fevers, chills, and non-productive cough  Workup:  · Imaging:  Xr Chest 1 View Portable    Result Date: 2021  Impression: Bilateral airspace opacities  In the setting of clinically suspected/proven COVID-19, this plain film appearance while nonspecific, can be seen in cases of viral pneumonia such as COVID-19  Workstation performed: [id number]       · Labs:     Recent Labs     21  1439 21  1441 21  0543   FERRITIN  --  1,806* 2,239*   CRP  --   --  370 4*   DDIMER 0 65*  --  0 71*     D-Dimer elevated however WNL when correcting for age   Ferritin elevated   CRP elevated >90  BNP + Troponin WNL  Procal -X2    Recent Labs     21  1452 21  0543   PROCALCITONI 0 15 0 22       · Oxygen:  Saturating 96%% on 4 liters/min via nasal cannula    COVID Stage: MODERATE    Plan:  · Discontinued antibiotics  · Meds  · Vitamin D3 2000 units daily  · Vitamin C 1000 BID  · Zinc 220 mg x 7 days then multivitamin qd  · Famotidine 20 mg PO b i d  for acid suppressive therapy  · Remdesevir 200 mg IV 1 day, then 100 mg IV daily x4 days  Day #2  · Dexamethasone 6 mg IV daily times 10 days  Day #2    · Atorvastatin 40 mg PO HS    · Anticoamg lovonox BID  · Non-pharmacologic interventions  · PT and OT eval and treat  · Self-proning if able  · Incentive spirometry  · Consult Pulm/CC if worsening oxygen requirements      Sepsis ([address] )  Assessment & Plan  · Meets SIRS criteria with tachypnia, tachycardia, and leukocytosis POA w/ likely source as COVID pneumonia  Still meets sepsis criteria w/ tachypnia, tachycardia, leukocytosis  WBCs increased however suspect remain elevated due to steroids  Plan:  · Continue to monitor vitals     Type 2 diabetes mellitus [facility])  Assessment & Plan  Lab Results   Component Value Date    HGBA1C 8 6 (H) 2021     · 204 POA  No prior history of diagnosed DM  · Hemoglobin A1c 8 6  based on this finding newly diagnosed DM Type II  Plan:  · Accuchecks + SSI considering the patient is likely diabetic and will be started on steroids  · Upon discharge recommend lifestyle management vs starting metformin     Hyponatremia  Assessment & Plan  Recent Labs     21  1441 21  0543   SODIUM 131* 135*     · 131 POA  133 when correcting for hyperglycemia  · 1000cc bolus NS in the ED for sepsis criteria      Plan:  · Discontinue IVF  Encourage oral hydration  · Repeat BMP in the AM to monitor electrolytes         VTE Pharmacologic Prophylaxis:   Pharmacologic: Enoxaparin (Lovenox)  Mechanical VTE Prophylaxis in Place: Yes    Discussions with Specialists or Other Care Team Provider: None    Education and Discussions with Family / Patient: LM for Wife Alica Schilder  Current Length of Stay: 2 day(s)    Current Patient Status: Inpatient     Discharge Plan / Estimated Discharge Date: <48 hours  COVID 19 pneumonia with respiratory distress  Code Status: Level 1 - Full Code      Subjective:   Patient was awake and sitting up in bed  He admits to having increased SOB overnight with minimal exertion  He states he was unable to sleep well due to the SOB  He denies any fever, chills, dizziness, abdominal pain, diarrhea, nausea, vomiting or decreased appetite  He ate his breakfast and tolerated it well      Objective:     Vitals:   Temp (24hrs), Av 2 °F (36 8 °C), Min:97 8 °F (36 6 °C), Max:98 7 °F (37 1 °C)    Temp:  [97 8 °F (36 6 °C)-98 7 °F (37 1 °C)] 97 8 °F (36 6 °C)  HR:  [75-92] 75  Resp:  [17-20] 20  BP: (113-132)/(63-70) 113/63  SpO2:  [91 %-94 %] 93 %  Body mass index is 32 72 kg/m²  Input and Output Summary (last 24 hours): Intake/Output Summary (Last 24 hours) at 1/24/2021 1221  Last data filed at 1/23/2021 2121  Gross per 24 hour   Intake 240 ml   Output    Net 240 ml       Physical Exam:     Physical Exam  Vitals signs and nursing note reviewed  Constitutional:       General: He is not in acute distress  Appearance: Normal appearance  He is not ill-appearing or toxic-appearing  HENT:      Head: Normocephalic and atraumatic  Nose: Nose normal    Eyes:      General: No scleral icterus  Right eye: No discharge  Left eye: No discharge  Pupils: Pupils are equal, round, and reactive to light  Neck:      Musculoskeletal: Normal range of motion and neck supple  No neck rigidity or muscular tenderness  Cardiovascular:      Rate and Rhythm: Normal rate and regular rhythm  Pulses: Normal pulses  Heart sounds: Normal heart sounds  No murmur  No friction rub  No gallop  Pulmonary:      Effort: Respiratory distress (mild with talking or minimal exertion) present  Breath sounds: Normal breath sounds  No stridor  No wheezing, rhonchi or rales  Abdominal:      General: Bowel sounds are normal       Palpations: Abdomen is soft  Tenderness: There is no abdominal tenderness  There is no guarding or rebound  Musculoskeletal:         General: No swelling, tenderness or deformity  Right lower leg: No edema  Left lower leg: No edema  Skin:     General: Skin is warm and dry  Capillary Refill: Capillary refill takes less than 2 seconds  Coloration: Skin is not jaundiced  Findings: No lesion or rash  Neurological:      General: No focal deficit present  Mental Status: He is alert and oriented to person, place, and time  Mental status is at baseline  Motor: No weakness  Psychiatric:         Mood and Affect: Mood normal          Behavior: Behavior normal          Thought Content: Thought content normal          Judgment: Judgment normal        Additional Data:     Labs:    Results from last 7 days   Lab Units 01/24/21  0938  01/22/21  1441   WBC Thousand/uL 10 97*   < > 10 44*   HEMOGLOBIN g/dL 12 7   < > 13 4   HEMATOCRIT % 38 2   < > 39 7   PLATELETS Thousands/uL 393*   < > 330   NEUTROS PCT %  --   --  90*   LYMPHS PCT %  --   --  5*   LYMPHO PCT % 5*   < >  --    MONOS PCT %  --   --  4   MONO PCT % 8   < >  --    EOS PCT % 0   < > 0    < > = values in this interval not displayed  Results from last 7 days   Lab Units 01/24/21  0938   POTASSIUM mmol/L 4 2   CHLORIDE mmol/L 101   CO2 mmol/L 25   BUN mg/dL 14   CREATININE mg/dL 0 74   CALCIUM mg/dL 8 5   ALK PHOS U/L 116   ALT U/L 86*   AST U/L 49*     Results from last 7 days   Lab Units 01/22/21  1439   INR  1 04       * I Have Reviewed All Lab Data Listed Above  * Additional Pertinent Lab Tests Reviewed: All Labs Within Last 24 Hours Reviewed    Imaging:    Imaging Reports Reviewed Today Include: CXR  Imaging Personally Reviewed by Myself Includes:  CXR    Recent Cultures (last 7 days):     Results from last 7 days   Lab Units 01/22/21  1452 01/22/21  1439   BLOOD CULTURE  No Growth at 24 hrs  No Growth at 24 hrs         Last 24 Hours Medication List:   Current Facility-Administered Medications   Medication Dose Route Frequency Provider Last Rate    acetaminophen  650 mg Oral Q6H PRN Bhatia Lathe, DO      ascorbic acid  1,000 mg Oral Q12H [address] Bhatia Lathe, DO      atorvastatin  40 mg Oral HS Bhatia Lathe, DO      benzonatate  100 mg Oral TID Bhatia Lathe, DO      cholecalciferol  2,000 Units Oral Daily Bhatia Lathe, DO      dexamethasone  6 mg Intravenous Q24H Bhatia Lathe, DO      enoxaparin  30 mg Subcutaneous Q12H [address] Jason Hoots, CRNP      famotidine  20 mg Oral Q12H [address] Bhatia Lathe, DO      insulin lispro  1-6 Units Subcutaneous TID [facility] Kajal Pontiff, DO      melatonin  6 mg Oral HS Kajal Pontiff, DO      zinc sulfate  220 mg Oral Daily Kajal Pontiff, DO      Followed by   Mohan Day ON 1/30/2021] multivitamin-minerals  1 tablet Oral Daily Kajal Pontiff, DO      ondansetron  4 mg Intravenous Q4H PRN Kajal Pontiff, DO      remdesivir  100 mg Intravenous Q24H Kajal Pontiff, DO          Today, Patient Was Seen By: Clarisa Perez MD    ** Please Note: This note has been constructed using a voice recognition system   **

## 2021-01-25 LAB
ALBUMIN SERPL BCP-MCNC: 2.3 G/DL (ref 3.5–5)
ALP SERPL-CCNC: 113 U/L (ref 46–116)
ALT SERPL W P-5'-P-CCNC: 85 U/L (ref 12–78)
ANION GAP SERPL CALCULATED.3IONS-SCNC: 11 MMOL/L (ref 4–13)
AST SERPL W P-5'-P-CCNC: 42 U/L (ref 5–45)
BASOPHILS # BLD AUTO: 0.03 THOUSANDS/ΜL (ref 0–0.1)
BASOPHILS NFR BLD AUTO: 0 % (ref 0–1)
BILIRUB SERPL-MCNC: 0.28 MG/DL (ref 0.2–1)
BUN SERPL-MCNC: 18 MG/DL (ref 5–25)
CALCIUM ALBUM COR SERPL-MCNC: 10.1 MG/DL (ref 8.3–10.1)
CALCIUM SERPL-MCNC: 8.7 MG/DL (ref 8.3–10.1)
CHLORIDE SERPL-SCNC: 101 MMOL/L (ref 100–108)
CO2 SERPL-SCNC: 24 MMOL/L (ref 21–32)
CREAT SERPL-MCNC: 0.75 MG/DL (ref 0.6–1.3)
EOSINOPHIL # BLD AUTO: 0 THOUSAND/ΜL (ref 0–0.61)
EOSINOPHIL NFR BLD AUTO: 0 % (ref 0–6)
ERYTHROCYTE [DISTWIDTH] IN BLOOD BY AUTOMATED COUNT: 14.3 % (ref 11.6–15.1)
GFR SERPL CREATININE-BSD FRML MDRD: 96 ML/MIN/1.73SQ M
GLUCOSE SERPL-MCNC: 120 MG/DL (ref 65–140)
GLUCOSE SERPL-MCNC: 191 MG/DL (ref 65–140)
GLUCOSE SERPL-MCNC: 226 MG/DL (ref 65–140)
GLUCOSE SERPL-MCNC: 240 MG/DL (ref 65–140)
GLUCOSE SERPL-MCNC: 284 MG/DL (ref 65–140)
HCT VFR BLD AUTO: 38.9 % (ref 36.5–49.3)
HGB BLD-MCNC: 12.8 G/DL (ref 12–17)
IMM GRANULOCYTES # BLD AUTO: 0.18 THOUSAND/UL (ref 0–0.2)
IMM GRANULOCYTES NFR BLD AUTO: 2 % (ref 0–2)
LYMPHOCYTES # BLD AUTO: 0.62 THOUSANDS/ΜL (ref 0.6–4.47)
LYMPHOCYTES NFR BLD AUTO: 5 % (ref 14–44)
MCH RBC QN AUTO: 30.3 PG (ref 26.8–34.3)
MCHC RBC AUTO-ENTMCNC: 32.9 G/DL (ref 31.4–37.4)
MCV RBC AUTO: 92 FL (ref 82–98)
MONOCYTES # BLD AUTO: 0.52 THOUSAND/ΜL (ref 0.17–1.22)
MONOCYTES NFR BLD AUTO: 4 % (ref 4–12)
NEUTROPHILS # BLD AUTO: 10.82 THOUSANDS/ΜL (ref 1.85–7.62)
NEUTS SEG NFR BLD AUTO: 89 % (ref 43–75)
NRBC BLD AUTO-RTO: 0 /100 WBCS
PLATELET # BLD AUTO: 447 THOUSANDS/UL (ref 149–390)
PMV BLD AUTO: 10.8 FL (ref 8.9–12.7)
POTASSIUM SERPL-SCNC: 4.3 MMOL/L (ref 3.5–5.3)
PROT SERPL-MCNC: 6.8 G/DL (ref 6.4–8.2)
RBC # BLD AUTO: 4.23 MILLION/UL (ref 3.88–5.62)
SODIUM SERPL-SCNC: 136 MMOL/L (ref 136–145)
WBC # BLD AUTO: 12.17 THOUSAND/UL (ref 4.31–10.16)

## 2021-01-25 PROCEDURE — 99232 SBSQ HOSP IP/OBS MODERATE 35: CPT | Performed by: INTERNAL MEDICINE

## 2021-01-25 PROCEDURE — 85025 COMPLETE CBC W/AUTO DIFF WBC: CPT | Performed by: PHYSICIAN ASSISTANT

## 2021-01-25 PROCEDURE — 82948 REAGENT STRIP/BLOOD GLUCOSE: CPT

## 2021-01-25 PROCEDURE — 80053 COMPREHEN METABOLIC PANEL: CPT | Performed by: PHYSICIAN ASSISTANT

## 2021-01-25 RX ORDER — INSULIN GLARGINE 100 [IU]/ML
10 INJECTION, SOLUTION SUBCUTANEOUS EVERY MORNING
Status: DISCONTINUED | OUTPATIENT
Start: 2021-01-25 | End: 2021-01-26 | Stop reason: HOSPADM

## 2021-01-25 RX ADMIN — MELATONIN 6 MG: at 21:45

## 2021-01-25 RX ADMIN — REMDESIVIR 100 MG: 100 INJECTION, POWDER, LYOPHILIZED, FOR SOLUTION INTRAVENOUS at 21:45

## 2021-01-25 RX ADMIN — FAMOTIDINE 20 MG: 20 TABLET ORAL at 21:45

## 2021-01-25 RX ADMIN — ATORVASTATIN CALCIUM 40 MG: 40 TABLET, FILM COATED ORAL at 21:45

## 2021-01-25 RX ADMIN — INSULIN LISPRO 2 UNITS: 100 INJECTION, SOLUTION INTRAVENOUS; SUBCUTANEOUS at 09:00

## 2021-01-25 RX ADMIN — ENOXAPARIN SODIUM 30 MG: 30 INJECTION SUBCUTANEOUS at 21:46

## 2021-01-25 RX ADMIN — BENZONATATE 100 MG: 100 CAPSULE ORAL at 08:58

## 2021-01-25 RX ADMIN — INSULIN LISPRO 4 UNITS: 100 INJECTION, SOLUTION INTRAVENOUS; SUBCUTANEOUS at 12:04

## 2021-01-25 RX ADMIN — INSULIN GLARGINE 10 UNITS: 100 INJECTION, SOLUTION SUBCUTANEOUS at 12:09

## 2021-01-25 RX ADMIN — ENOXAPARIN SODIUM 30 MG: 30 INJECTION SUBCUTANEOUS at 08:58

## 2021-01-25 RX ADMIN — BENZONATATE 100 MG: 100 CAPSULE ORAL at 17:02

## 2021-01-25 RX ADMIN — OXYCODONE HYDROCHLORIDE AND ACETAMINOPHEN 1000 MG: 500 TABLET ORAL at 08:58

## 2021-01-25 RX ADMIN — OXYCODONE HYDROCHLORIDE AND ACETAMINOPHEN 1000 MG: 500 TABLET ORAL at 21:45

## 2021-01-25 RX ADMIN — BENZONATATE 100 MG: 100 CAPSULE ORAL at 21:45

## 2021-01-25 RX ADMIN — Medication 2000 UNITS: at 08:58

## 2021-01-25 RX ADMIN — ZINC SULFATE 220 MG (50 MG) CAPSULE 220 MG: CAPSULE at 08:58

## 2021-01-25 RX ADMIN — FAMOTIDINE 20 MG: 20 TABLET ORAL at 08:58

## 2021-01-25 RX ADMIN — DEXAMETHASONE SODIUM PHOSPHATE 6 MG: 4 INJECTION INTRA-ARTICULAR; INTRALESIONAL; INTRAMUSCULAR; INTRAVENOUS; SOFT TISSUE at 21:45

## 2021-01-25 NOTE — PHYSICAL THERAPY NOTE
PHYSICAL THERAPY SCREEN NOTE    Patient Name: Kevin Landers  YWWLO'B Date: 1/25/2021 01/25/21 1310   PT Last Visit   PT Visit Date 01/25/21   Note Type   Note type Screen   Activity Tolerance   Medical Staff Made Aware Spoke to ANDREE Tolentino, notified [address] to WALT [place]   Assessment   Assessment PT orders received, chart review performed  Spoke to [place] who states pt has been independently ambulating around room  Therapist observed pt ambulating around room earlier, pt also able to squat down to pick something up without LOB or difficulty  Contact made w/ pt  Pt is primarily [address] speaking but able to participate in basic conversation  Pt reports living in a 1 SH w/ his wife and daughter who are both able to help as needed upon DC  Pt reports his only c/o is his SOB, however he reports being able to navigate around pt room without difficulty and no LOB  Pt reports being able to navigate w/ his O2 line without difficulty as well  Pt reports no concerns w/ navigating environment upon DC  Pt w/ no acute PT needs at this time, will DC PT  Please reconsult if any changes or needs arise       Ruben Jenkins, PT, DPT

## 2021-01-25 NOTE — OCCUPATIONAL THERAPY NOTE
Occupational Therapy Evaluation     Patient Name: Carline Marie  YFOUS'T Date: 1/25/2021  Problem List  Principal Problem:    Pneumonia due to COVID-19 virus  Active Problems:    Sepsis ([facility] [address] )    Hyponatremia    Type 2 diabetes mellitus ([facility] [address] )    Past Medical History  History reviewed  No pertinent past medical history  Past Surgical History  History reviewed  No pertinent surgical history  01/25/21 1359   OT Last Visit   OT Visit Date 01/25/21  (Monday)   Note Type   Note type Screen   Assessment   Assessment OT orders received and chart review completed  Sami w/ Pt, Danyel Hernandez  Pt completing ADL at /near baseline level of I w/ in room  Pt is ambulating w/ out assistance or LOB w/ in room  Pt lives w/ family  No acute OT needs at this time   D/C OT     [facility], OTR/L

## 2021-01-25 NOTE — PLAN OF CARE
Problem: RESPIRATORY - ADULT  Goal: Achieves optimal ventilation and oxygenation  Description: INTERVENTIONS:  - Assess for changes in respiratory status  - Assess for changes in mentation and behavior  - Position to facilitate oxygenation and minimize respiratory effort  - Oxygen administered by appropriate delivery if ordered  - Initiate smoking cessation education as indicated  - Encourage broncho-pulmonary hygiene including cough, deep breathe, Incentive Spirometry  - Assess the need for suctioning and aspirate as needed  - Assess and instruct to report SOB or any respiratory difficulty  - Respiratory Therapy support as indicated  Outcome: Progressing     Problem: GASTROINTESTINAL - ADULT  Goal: Minimal or absence of nausea and/or vomiting  Description: INTERVENTIONS:  - Administer IV fluids if ordered to ensure adequate hydration  - Maintain NPO status until nausea and vomiting are resolved  - Nasogastric tube if ordered  - Administer ordered antiemetic medications as needed  - Provide nonpharmacologic comfort measures as appropriate  - Advance diet as tolerated, if ordered  - Consider nutrition services referral to assist patient with adequate nutrition and appropriate food choices  Outcome: Progressing  Goal: Maintains adequate nutritional intake  Description: INTERVENTIONS:  - Monitor percentage of each meal consumed  - Identify factors contributing to decreased intake, treat as appropriate  - Assist with meals as needed  - Monitor I&O, weight, and lab values if indicated  - Obtain nutrition services referral as needed  Outcome: Progressing     Problem: SAFETY ADULT  Goal: Patient will remain free of falls  Description: INTERVENTIONS:  - Assess patient frequently for physical needs  -  Identify cognitive and physical deficits and behaviors that affect risk of falls    -  [place] fall precautions as indicated by assessment   - Educate patient/family on patient safety including physical limitations  - Instruct patient to call for assistance with activity based on assessment  - Modify environment to reduce risk of injury  - Consider OT/PT consult to assist with strengthening/mobility  Outcome: Progressing  Goal: Maintain or return to baseline ADL function  Description: INTERVENTIONS:  -  Assess patient's ability to carry out ADLs; assess patient's baseline for ADL function and identify physical deficits which impact ability to perform ADLs (bathing, care of mouth/teeth, toileting, grooming, dressing, etc )  - Assess/evaluate cause of self-care deficits   - Assess range of motion  - Assess patient's mobility; develop plan if impaired  - Assess patient's need for assistive devices and provide as appropriate  - Encourage maximum independence but intervene and supervise when necessary  - Involve family in performance of ADLs  - Assess for home care needs following discharge   - Consider OT consult to assist with ADL evaluation and planning for discharge  - Provide patient education as appropriate  Outcome: Progressing  Goal: Maintain or return mobility status to optimal level  Description: INTERVENTIONS:  - Assess patient's baseline mobility status (ambulation, transfers, stairs, etc )    - Identify cognitive and physical deficits and behaviors that affect mobility  - Identify mobility aids required to assist with transfers and/or ambulation (gait belt, sit-to-stand, lift, walker, cane, etc )  - [place] fall precautions as indicated by assessment  - Record patient progress and toleration of activity level on Mobility SBAR; progress patient to next Phase/Stage  - Instruct patient to call for assistance with activity based on assessment  - Consider rehabilitation consult to assist with strengthening/weightbearing, etc   Outcome: Progressing     Problem: DISCHARGE PLANNING  Goal: Discharge to home or other facility with appropriate resources  Description: INTERVENTIONS:  - Identify barriers to discharge w/patient and caregiver  - Arrange for needed discharge resources and transportation as appropriate  - Identify discharge learning needs (meds, wound care, etc )  - Arrange for interpretive services to assist at discharge as needed  - Refer to Case Management Department for coordinating discharge planning if the patient needs post-hospital services based on physician/advanced practitioner order or complex needs related to functional status, cognitive ability, or social support system  Outcome: Progressing     Problem: Knowledge Deficit  Goal: Patient/family/caregiver demonstrates understanding of disease process, treatment plan, medications, and discharge instructions  Description: Complete learning assessment and assess knowledge base  Interventions:  - Provide teaching at level of understanding  - Provide teaching via preferred learning methods  Outcome: Progressing     Problem: Potential for Falls  Goal: Patient will remain free of falls  Description: INTERVENTIONS:  - Assess patient frequently for physical needs  -  Identify cognitive and physical deficits and behaviors that affect risk of falls  -  [place] fall precautions as indicated by assessment   - Educate patient/family on patient safety including physical limitations  - Instruct patient to call for assistance with activity based on assessment  - Modify environment to reduce risk of injury  - Consider OT/PT consult to assist with strengthening/mobility  Outcome: Progressing     Problem: Nutrition/Hydration-ADULT  Goal: Nutrient/Hydration intake appropriate for improving, restoring or maintaining nutritional needs  Description: Monitor and assess patient's nutrition/hydration status for malnutrition  Collaborate with interdisciplinary team and initiate plan and interventions as ordered  Monitor patient's weight and dietary intake as ordered or per policy  Utilize nutrition screening tool and intervene as necessary   Determine patient's food preferences and provide high-protein, high-caloric foods as appropriate       INTERVENTIONS:  - Monitor oral intake, urinary output, labs, and treatment plans  - Assess nutrition and hydration status and recommend course of action  - Evaluate amount of meals eaten  - Assist patient with eating if necessary   - Allow adequate time for meals  - Recommend/ encourage appropriate diets, oral nutritional supplements, and vitamin/mineral supplements  - Order, calculate, and assess calorie counts as needed  - Recommend, monitor, and adjust tube feedings and TPN/PPN based on assessed needs  - Assess need for intravenous fluids  - Provide specific nutrition/hydration education as appropriate  - Include patient/family/caregiver in decisions related to nutrition  Outcome: Progressing     Problem: Prexisting or High Potential for Compromised Skin Integrity  Goal: Skin integrity is maintained or improved  Description: INTERVENTIONS:  - Identify patients at risk for skin breakdown  - Assess and monitor skin integrity  - Assess and monitor nutrition and hydration status  - Monitor labs   - Assess for incontinence   - Turn and reposition patient  - Assist with mobility/ambulation  - Relieve pressure over bony prominences  - Avoid friction and shearing  - Provide appropriate hygiene as needed including keeping skin clean and dry  - Evaluate need for skin moisturizer/barrier cream  - Collaborate with interdisciplinary team   - Patient/family teaching  - Consider wound care consult   Outcome: Progressing

## 2021-01-25 NOTE — ASSESSMENT & PLAN NOTE
Lab Results   Component Value Date    SARSCOV2 Positive (A) 2021       Patient presenting with symptoms of COVID-19 SYMPTOMS: shortness of breath  Patient experiencing worsening SOB over the past 2-3 days with subjective fevers, chills, and non-productive cough  Workup:  · Imaging:  Xr Chest 1 View Portable    Result Date: 2021  Impression: Bilateral airspace opacities  In the setting of clinically suspected/proven COVID-19, this plain film appearance while nonspecific, can be seen in cases of viral pneumonia such as COVID-19  Workstation performed: [id number]       · Labs:     Recent Labs     21  1439 21  1441 21  0543   FERRITIN  --  1,806* 2,239*   CRP  --   --  370 4*   DDIMER 0 65*  --  0 71*     D-Dimer elevated however WNL when correcting for age   Ferritin elevated   CRP elevated  BNP + Troponin WNL  Procal -X2    Recent Labs     21  1452 21  0543   PROCALCITONI 0 15 0 22       · Oxygen:  Saturating 93% on 2 liters/min via nasal cannula   Titrated down from 4L  Was able to tolerate room air with oxygen saturations in the low 90s, however when ambulating desaturates  · Ambulatory pulse ox conducted: Patient desaturates to mid [de-identified]     COVID Stage: MODERATE    Plan:  · Discontinued antibiotics  · Home O2 evaluation tomorrow   · Continue moderate protocol    · Meds  · Vitamin D3 2000 units daily  · Vitamin C 1000 BID  · Zinc 220 mg x 7 days then multivitamin qd  · Famotidine 20 mg PO b i d  for acid suppressive therapy  · Remdesevir 200 mg IV 1 day, then 100 mg IV daily x4 days  Day #4  · Dexamethasone 6 mg IV daily times 10 days   Day #4  · Atorvastatin 40 mg PO HS    · Anticoamg lovonox BID  · Non-pharmacologic interventions  · PT and OT eval and treat  · Self-proning if able  · Incentive spirometry  · Consult Pulm/CC if worsening oxygen requirements

## 2021-01-25 NOTE — ASSESSMENT & PLAN NOTE
Recent Labs     01/23/21  0543 01/24/21  0938 01/25/21  0453   SODIUM 135* 136 136     · 131 POA  133 when correcting for hyperglycemia  · 1000cc bolus NS in the ED for sepsis criteria      Plan:  · Discontinue IVF  Encourage oral hydration    · Repeat BMP in the AM to monitor electrolytes

## 2021-01-25 NOTE — PROGRESS NOTES
Progress Note - [facility] 1955, 76 y o  male MRN: [id number]    Unit/Bed#: S -01 Encounter: [id number]    Primary Care Provider: Krystal Chowdhury DO   Date and time admitted to hospital: 1/22/2021  1:50 PM        Type 2 diabetes mellitus [facility])  Assessment & Plan  Lab Results   Component Value Date    HGBA1C 8 6 (H) 01/22/2021     · 204 POA  No prior history of diagnosed DM  · Hemoglobin A1c 8 6  based on this finding newly diagnosed DM Type II  Plan:  · Accuchecks + SSI considering the patient is likely diabetic and will be started on steroids  · Upon discharge recommend lifestyle management vs starting metformin     Hyponatremia  Assessment & Plan  Recent Labs     01/23/21  0543 01/24/21  0938 01/25/21  0453   SODIUM 135* 136 136     · 131 POA  133 when correcting for hyperglycemia  · 1000cc bolus NS in the ED for sepsis criteria      Plan:  · Discontinue IVF  Encourage oral hydration  · Repeat BMP in the AM to monitor electrolytes     Sepsis ([facility] [address] )  Assessment & Plan  · Meets SIRS criteria with tachypnia, tachycardia, and leukocytosis POA w/ likely source as COVID pneumonia  Still meets sepsis criteria w/ tachypnia, tachycardia, leukocytosis  WBCs increased however suspect remain elevated due to steroids  Plan:  · Continue to monitor vitals     * Pneumonia due to COVID-19 virus  Assessment & Plan  Lab Results   Component Value Date    SARSCOV2 Positive (A) 01/22/2021       Patient presenting with symptoms of COVID-19 SYMPTOMS: shortness of breath  Patient experiencing worsening SOB over the past 2-3 days with subjective fevers, chills, and non-productive cough  Workup:  · Imaging:  Xr Chest 1 View Portable    Result Date: 1/22/2021  Impression: Bilateral airspace opacities  In the setting of clinically suspected/proven COVID-19, this plain film appearance while nonspecific, can be seen in cases of viral pneumonia such as COVID-19   Workstation performed: [id number]       · Labs: Recent Labs     21  1439 21  1441 21  0543   FERRITIN  --  1,806* 2,239*   CRP  --   --  370 4*   DDIMER 0 65*  --  0 71*     D-Dimer elevated however WNL when correcting for age   Ferritin elevated   CRP elevated  BNP + Troponin WNL  Procal -X2    Recent Labs     21  1452 21  0543   PROCALCITONI 0 15 0 22       · Oxygen:  Saturating 93% on 2 liters/min via nasal cannula   Titrated down from 4L  Was able to tolerate room air with oxygen saturations in the low 90s, however when ambulating desaturates  · Ambulatory pulse ox conducted: Patient desaturates to mid [de-identified]     COVID Stage: MODERATE    Plan:  · Discontinued antibiotics  · Home O2 evaluation tomorrow   · Continue moderate protocol    · Meds  · Vitamin D3 2000 units daily  · Vitamin C 1000 BID  · Zinc 220 mg x 7 days then multivitamin qd  · Famotidine 20 mg PO b i d  for acid suppressive therapy  · Remdesevir 200 mg IV 1 day, then 100 mg IV daily x4 days  Day #4  · Dexamethasone 6 mg IV daily times 10 days  Day #4  · Atorvastatin 40 mg PO HS    · Anticoamg lovonox BID  · Non-pharmacologic interventions  · PT and OT eval and treat  · Self-proning if able  · Incentive spirometry  · Consult Pulm/CC if worsening oxygen requirements          VTE Pharmacologic Prophylaxis:   Pharmacologic: Enoxaparin (Lovenox)  Mechanical VTE Prophylaxis in Place: Yes    Discussions with Specialists or Other Care Team Provider: None    Education and Discussions with Family / Patient: Plan of care discussed with patient and family (wife) over the phone  Current Length of Stay: 3 day(s)    Current Patient Status: Inpatient     Discharge Plan / Estimated Discharge Date:  pending home o2 eval     Code Status: Level 1 - Full Code      Subjective:   Patient feels much improved from yesterday  States that he slept well and feels less SOB  Only SOB when walking       Objective:     Vitals:   Temp (24hrs), Av 5 °F (36 9 °C), Min:98 4 °F (36 9 °C), Max:98 5 °F (36 9 °C)    Temp:  [98 4 °F (36 9 °C)-98 5 °F (36 9 °C)] 98 5 °F (36 9 °C)  HR:  [71-75] 71  Resp:  [18-32] 18  BP: (128-160)/(65-72) 160/72  SpO2:  [93 %-95 %] 94 %  Body mass index is 32 72 kg/m²  Input and Output Summary (last 24 hours): Intake/Output Summary (Last 24 hours) at 1/25/2021 1123  Last data filed at 1/25/2021 0456  Gross per 24 hour   Intake 420 ml   Output 0 ml   Net 420 ml       Physical Exam:     Physical Exam  Constitutional:       General: He is not in acute distress  HENT:      Head: Normocephalic and atraumatic  Mouth/Throat:      Mouth: Mucous membranes are moist       Pharynx: Oropharynx is clear  Eyes:      Extraocular Movements: Extraocular movements intact  Pupils: Pupils are equal, round, and reactive to light  Neck:      Musculoskeletal: Normal range of motion  Cardiovascular:      Rate and Rhythm: Normal rate and regular rhythm  Pulmonary:      Effort: Pulmonary effort is normal       Breath sounds: Normal breath sounds  No wheezing or rales  Comments: Lungs were clear to auscultation throughout  This is significant improvement from prior examination  Abdominal:      General: Abdomen is flat  Palpations: Abdomen is soft  Tenderness: There is no abdominal tenderness  There is no guarding  Musculoskeletal: Normal range of motion  Skin:     General: Skin is warm and dry  Neurological:      General: No focal deficit present  Mental Status: He is alert and oriented to person, place, and time     Psychiatric:         Mood and Affect: Mood normal          Additional Data:     Labs:    Results from last 7 days   Lab Units 01/25/21  0453   WBC Thousand/uL 12 17*   HEMOGLOBIN g/dL 12 8   HEMATOCRIT % 38 9   PLATELETS Thousands/uL 447*   NEUTROS PCT % 89*   LYMPHS PCT % 5*   MONOS PCT % 4   EOS PCT % 0     Results from last 7 days   Lab Units 01/25/21  0453   POTASSIUM mmol/L 4 3   CHLORIDE mmol/L 101   CO2 mmol/L 24 BUN mg/dL 18   CREATININE mg/dL 0 75   CALCIUM mg/dL 8 7   ALK PHOS U/L 113   ALT U/L 85*   AST U/L 42     Results from last 7 days   Lab Units 01/22/21  1439   INR  1 04       * I Have Reviewed All Lab Data Listed Above  * Additional Pertinent Lab Tests Reviewed: All Labs Within Last 24 Hours Reviewed    Imaging:    Imaging Reports Reviewed Today Include: None  Imaging Personally Reviewed by Myself Includes:  None    Recent Cultures (last 7 days):     Results from last 7 days   Lab Units 01/22/21  1452 01/22/21  1439   BLOOD CULTURE  No Growth at 48 hrs  No Growth at 48 hrs  Last 24 Hours Medication List:   Current Facility-Administered Medications   Medication Dose Route Frequency Provider Last Rate    acetaminophen  650 mg Oral Q6H PRN [facility], DO      ascorbic acid  1,000 mg Oral Q12H [address] [facility], DO      atorvastatin  40 mg Oral HS [facility], DO      benzonatate  100 mg Oral TID [facility], DO      cholecalciferol  2,000 Units Oral Daily [facility], DO      dexamethasone  6 mg Intravenous Q24H [facility], DO      enoxaparin  30 mg Subcutaneous Q12H [address] [facility], CRNP      famotidine  20 mg Oral Q12H [address] [facility], DO      insulin lispro  1-6 Units Subcutaneous TID [facility] [place], DO      melatonin  6 mg Oral HS [facility], DO      zinc sulfate  220 mg Oral Daily [facility], DO      Followed by   Jamel Tovar ON 1/30/2021] multivitamin-minerals  1 tablet Oral Daily [facility], DO      ondansetron  4 mg Intravenous Q4H PRN [facility], DO      remdesivir  100 mg Intravenous Q24H [facility],  mg (01/24/21 2056)        Today, Patient Was Seen By: Ibrahima Lawrence DO    ** Please Note: This note has been constructed using a voice recognition system   **

## 2021-01-25 NOTE — PLAN OF CARE
Problem: RESPIRATORY - ADULT  Goal: Achieves optimal ventilation and oxygenation  Description: INTERVENTIONS:  - Assess for changes in respiratory status  - Assess for changes in mentation and behavior  - Position to facilitate oxygenation and minimize respiratory effort  - Oxygen administered by appropriate delivery if ordered  - Initiate smoking cessation education as indicated  - Encourage broncho-pulmonary hygiene including cough, deep breathe, Incentive Spirometry  - Assess the need for suctioning and aspirate as needed  - Assess and instruct to report SOB or any respiratory difficulty  - Respiratory Therapy support as indicated  Outcome: Progressing     Problem: GASTROINTESTINAL - ADULT  Goal: Minimal or absence of nausea and/or vomiting  Description: INTERVENTIONS:  - Administer IV fluids if ordered to ensure adequate hydration  - Maintain NPO status until nausea and vomiting are resolved  - Nasogastric tube if ordered  - Administer ordered antiemetic medications as needed  - Provide nonpharmacologic comfort measures as appropriate  - Advance diet as tolerated, if ordered  - Consider nutrition services referral to assist patient with adequate nutrition and appropriate food choices  Outcome: Progressing  Goal: Maintains adequate nutritional intake  Description: INTERVENTIONS:  - Monitor percentage of each meal consumed  - Identify factors contributing to decreased intake, treat as appropriate  - Assist with meals as needed  - Monitor I&O, weight, and lab values if indicated  - Obtain nutrition services referral as needed  Outcome: Progressing     Problem: SAFETY ADULT  Goal: Patient will remain free of falls  Description: INTERVENTIONS:  - Assess patient frequently for physical needs  -  Identify cognitive and physical deficits and behaviors that affect risk of falls    -  [place] fall precautions as indicated by assessment   - Educate patient/family on patient safety including physical limitations  - Instruct patient to call for assistance with activity based on assessment  - Modify environment to reduce risk of injury  - Consider OT/PT consult to assist with strengthening/mobility  Outcome: Progressing  Goal: Maintain or return to baseline ADL function  Description: INTERVENTIONS:  -  Assess patient's ability to carry out ADLs; assess patient's baseline for ADL function and identify physical deficits which impact ability to perform ADLs (bathing, care of mouth/teeth, toileting, grooming, dressing, etc )  - Assess/evaluate cause of self-care deficits   - Assess range of motion  - Assess patient's mobility; develop plan if impaired  - Assess patient's need for assistive devices and provide as appropriate  - Encourage maximum independence but intervene and supervise when necessary  - Involve family in performance of ADLs  - Assess for home care needs following discharge   - Consider OT consult to assist with ADL evaluation and planning for discharge  - Provide patient education as appropriate  Outcome: Progressing  Goal: Maintain or return mobility status to optimal level  Description: INTERVENTIONS:  - Assess patient's baseline mobility status (ambulation, transfers, stairs, etc )    - Identify cognitive and physical deficits and behaviors that affect mobility  - Identify mobility aids required to assist with transfers and/or ambulation (gait belt, sit-to-stand, lift, walker, cane, etc )  - [place] fall precautions as indicated by assessment  - Record patient progress and toleration of activity level on Mobility SBAR; progress patient to next Phase/Stage  - Instruct patient to call for assistance with activity based on assessment  - Consider rehabilitation consult to assist with strengthening/weightbearing, etc   Outcome: Progressing     Problem: DISCHARGE PLANNING  Goal: Discharge to home or other facility with appropriate resources  Description: INTERVENTIONS:  - Identify barriers to discharge w/patient and caregiver  - Arrange for needed discharge resources and transportation as appropriate  - Identify discharge learning needs (meds, wound care, etc )  - Arrange for interpretive services to assist at discharge as needed  - Refer to Case Management Department for coordinating discharge planning if the patient needs post-hospital services based on physician/advanced practitioner order or complex needs related to functional status, cognitive ability, or social support system  Outcome: Progressing     Problem: Knowledge Deficit  Goal: Patient/family/caregiver demonstrates understanding of disease process, treatment plan, medications, and discharge instructions  Description: Complete learning assessment and assess knowledge base  Interventions:  - Provide teaching at level of understanding  - Provide teaching via preferred learning methods  Outcome: Progressing     Problem: Potential for Falls  Goal: Patient will remain free of falls  Description: INTERVENTIONS:  - Assess patient frequently for physical needs  -  Identify cognitive and physical deficits and behaviors that affect risk of falls  -  [place] fall precautions as indicated by assessment   - Educate patient/family on patient safety including physical limitations  - Instruct patient to call for assistance with activity based on assessment  - Modify environment to reduce risk of injury  - Consider OT/PT consult to assist with strengthening/mobility  Outcome: Progressing     Problem: Nutrition/Hydration-ADULT  Goal: Nutrient/Hydration intake appropriate for improving, restoring or maintaining nutritional needs  Description: Monitor and assess patient's nutrition/hydration status for malnutrition  Collaborate with interdisciplinary team and initiate plan and interventions as ordered  Monitor patient's weight and dietary intake as ordered or per policy  Utilize nutrition screening tool and intervene as necessary   Determine patient's food preferences and provide high-protein, high-caloric foods as appropriate       INTERVENTIONS:  - Monitor oral intake, urinary output, labs, and treatment plans  - Assess nutrition and hydration status and recommend course of action  - Evaluate amount of meals eaten  - Assist patient with eating if necessary   - Allow adequate time for meals  - Recommend/ encourage appropriate diets, oral nutritional supplements, and vitamin/mineral supplements  - Order, calculate, and assess calorie counts as needed  - Recommend, monitor, and adjust tube feedings and TPN/PPN based on assessed needs  - Assess need for intravenous fluids  - Provide specific nutrition/hydration education as appropriate  - Include patient/family/caregiver in decisions related to nutrition  Outcome: Progressing     Problem: Prexisting or High Potential for Compromised Skin Integrity  Goal: Skin integrity is maintained or improved  Description: INTERVENTIONS:  - Identify patients at risk for skin breakdown  - Assess and monitor skin integrity  - Assess and monitor nutrition and hydration status  - Monitor labs   - Assess for incontinence   - Turn and reposition patient  - Assist with mobility/ambulation  - Relieve pressure over bony prominences  - Avoid friction and shearing  - Provide appropriate hygiene as needed including keeping skin clean and dry  - Evaluate need for skin moisturizer/barrier cream  - Collaborate with interdisciplinary team   - Patient/family teaching  - Consider wound care consult   Outcome: Progressing

## 2021-01-26 VITALS
HEART RATE: 105 BPM | DIASTOLIC BLOOD PRESSURE: 75 MMHG | RESPIRATION RATE: 19 BRPM | HEIGHT: 65 IN | TEMPERATURE: 99.2 F | WEIGHT: 196.65 LBS | OXYGEN SATURATION: 92 % | BODY MASS INDEX: 32.76 KG/M2 | SYSTOLIC BLOOD PRESSURE: 125 MMHG

## 2021-01-26 LAB
ANION GAP SERPL CALCULATED.3IONS-SCNC: 8 MMOL/L (ref 4–13)
BASOPHILS # BLD MANUAL: 0 THOUSAND/UL (ref 0–0.1)
BASOPHILS NFR MAR MANUAL: 0 % (ref 0–1)
BUN SERPL-MCNC: 15 MG/DL (ref 5–25)
CALCIUM SERPL-MCNC: 8.7 MG/DL (ref 8.3–10.1)
CHLORIDE SERPL-SCNC: 98 MMOL/L (ref 100–108)
CO2 SERPL-SCNC: 25 MMOL/L (ref 21–32)
CREAT SERPL-MCNC: 0.74 MG/DL (ref 0.6–1.3)
CRP SERPL QL: 160.7 MG/L
D DIMER PPP FEU-MCNC: 0.55 UG/ML FEU
EOSINOPHIL # BLD MANUAL: 0 THOUSAND/UL (ref 0–0.4)
EOSINOPHIL NFR BLD MANUAL: 0 % (ref 0–6)
ERYTHROCYTE [DISTWIDTH] IN BLOOD BY AUTOMATED COUNT: 14 % (ref 11.6–15.1)
FERRITIN SERPL-MCNC: 815 NG/ML (ref 8–388)
GFR SERPL CREATININE-BSD FRML MDRD: 96 ML/MIN/1.73SQ M
GLUCOSE SERPL-MCNC: 241 MG/DL (ref 65–140)
GLUCOSE SERPL-MCNC: 241 MG/DL (ref 65–140)
GLUCOSE SERPL-MCNC: 248 MG/DL (ref 65–140)
HCT VFR BLD AUTO: 39 % (ref 36.5–49.3)
HGB BLD-MCNC: 13.3 G/DL (ref 12–17)
LYMPHOCYTES # BLD AUTO: 0 % (ref 14–44)
LYMPHOCYTES # BLD AUTO: 0 THOUSAND/UL (ref 0.6–4.47)
MCH RBC QN AUTO: 30.5 PG (ref 26.8–34.3)
MCHC RBC AUTO-ENTMCNC: 34.1 G/DL (ref 31.4–37.4)
MCV RBC AUTO: 89 FL (ref 82–98)
MONOCYTES # BLD AUTO: 0.53 THOUSAND/UL (ref 0–1.22)
MONOCYTES NFR BLD: 5 % (ref 4–12)
NEUTROPHILS # BLD MANUAL: 9.98 THOUSAND/UL (ref 1.85–7.62)
NEUTS SEG NFR BLD AUTO: 95 % (ref 43–75)
NRBC BLD AUTO-RTO: 0 /100 WBCS
PLATELET # BLD AUTO: 480 THOUSANDS/UL (ref 149–390)
PLATELET BLD QL SMEAR: ABNORMAL
PMV BLD AUTO: 10 FL (ref 8.9–12.7)
POTASSIUM SERPL-SCNC: 4.2 MMOL/L (ref 3.5–5.3)
RBC # BLD AUTO: 4.36 MILLION/UL (ref 3.88–5.62)
SODIUM SERPL-SCNC: 131 MMOL/L (ref 136–145)
TOTAL CELLS COUNTED SPEC: 100
WBC # BLD AUTO: 10.5 THOUSAND/UL (ref 4.31–10.16)

## 2021-01-26 PROCEDURE — 82948 REAGENT STRIP/BLOOD GLUCOSE: CPT

## 2021-01-26 PROCEDURE — 80048 BASIC METABOLIC PNL TOTAL CA: CPT | Performed by: STUDENT IN AN ORGANIZED HEALTH CARE EDUCATION/TRAINING PROGRAM

## 2021-01-26 PROCEDURE — 82728 ASSAY OF FERRITIN: CPT | Performed by: STUDENT IN AN ORGANIZED HEALTH CARE EDUCATION/TRAINING PROGRAM

## 2021-01-26 PROCEDURE — 85007 BL SMEAR W/DIFF WBC COUNT: CPT | Performed by: STUDENT IN AN ORGANIZED HEALTH CARE EDUCATION/TRAINING PROGRAM

## 2021-01-26 PROCEDURE — 99239 HOSP IP/OBS DSCHRG MGMT >30: CPT | Performed by: INTERNAL MEDICINE

## 2021-01-26 PROCEDURE — 85027 COMPLETE CBC AUTOMATED: CPT | Performed by: STUDENT IN AN ORGANIZED HEALTH CARE EDUCATION/TRAINING PROGRAM

## 2021-01-26 PROCEDURE — 94761 N-INVAS EAR/PLS OXIMETRY MLT: CPT

## 2021-01-26 PROCEDURE — 85379 FIBRIN DEGRADATION QUANT: CPT | Performed by: STUDENT IN AN ORGANIZED HEALTH CARE EDUCATION/TRAINING PROGRAM

## 2021-01-26 PROCEDURE — 86140 C-REACTIVE PROTEIN: CPT | Performed by: STUDENT IN AN ORGANIZED HEALTH CARE EDUCATION/TRAINING PROGRAM

## 2021-01-26 RX ORDER — ATORVASTATIN CALCIUM 40 MG/1
40 TABLET, FILM COATED ORAL
Qty: 14 TABLET | Refills: 0 | Status: SHIPPED | OUTPATIENT
Start: 2021-01-26 | End: 2021-02-05 | Stop reason: SDUPTHER

## 2021-01-26 RX ORDER — FAMOTIDINE 20 MG/1
20 TABLET, FILM COATED ORAL EVERY 12 HOURS SCHEDULED
Qty: 10 TABLET | Refills: 0 | Status: SHIPPED | OUTPATIENT
Start: 2021-01-26 | End: 2021-02-05 | Stop reason: ALTCHOICE

## 2021-01-26 RX ORDER — PREDNISONE 20 MG/1
40 TABLET ORAL DAILY
Qty: 10 TABLET | Refills: 0 | Status: SHIPPED | OUTPATIENT
Start: 2021-01-26 | End: 2021-01-31

## 2021-01-26 RX ADMIN — ENOXAPARIN SODIUM 30 MG: 30 INJECTION SUBCUTANEOUS at 09:09

## 2021-01-26 RX ADMIN — INSULIN LISPRO 3 UNITS: 100 INJECTION, SOLUTION INTRAVENOUS; SUBCUTANEOUS at 12:27

## 2021-01-26 RX ADMIN — OXYCODONE HYDROCHLORIDE AND ACETAMINOPHEN 1000 MG: 500 TABLET ORAL at 09:10

## 2021-01-26 RX ADMIN — BENZONATATE 100 MG: 100 CAPSULE ORAL at 09:10

## 2021-01-26 RX ADMIN — Medication 2000 UNITS: at 09:10

## 2021-01-26 RX ADMIN — INSULIN LISPRO 3 UNITS: 100 INJECTION, SOLUTION INTRAVENOUS; SUBCUTANEOUS at 09:09

## 2021-01-26 RX ADMIN — FAMOTIDINE 20 MG: 20 TABLET ORAL at 09:10

## 2021-01-26 RX ADMIN — ZINC SULFATE 220 MG (50 MG) CAPSULE 220 MG: CAPSULE at 09:10

## 2021-01-26 RX ADMIN — INSULIN GLARGINE 10 UNITS: 100 INJECTION, SOLUTION SUBCUTANEOUS at 09:09

## 2021-01-26 NOTE — DISCHARGE SUMMARY
Discharge- Donaldo Burt 1955, 76 y o  male MRN: [id number]    Unit/Bed#: S -01 Encounter: [id number]    Primary Care Provider: Jovanny Echeverria DO   Date and time admitted to hospital: 1/22/2021  1:50 PM        Type 2 diabetes mellitus [facility])  Assessment & Plan  Lab Results   Component Value Date    HGBA1C 8 6 (H) 01/22/2021     · 204 POA  No prior history of diagnosed DM  · Hemoglobin A1c 8 6  based on this finding newly diagnosed DM Type II  Plan:  · Will start on 1000mg metformin BID  Glucometer, lancets, and test strips were prescribed and sent to patient's pharmacy  Hyponatremia  Assessment & Plan  Recent Labs     01/24/21  9578 01/25/21  0453 01/26/21  0646   SODIUM 136 136 131*     · 131 POA  133 when correcting for hyperglycemia  · 1000cc bolus NS in the ED for sepsis criteria      Plan:  · Discontinue IVF  Encourage oral hydration  Sepsis ([facility] [address] )  Assessment & Plan  · Meets SIRS criteria with tachypnia, tachycardia, and leukocytosis POA w/ likely source as COVID pneumonia  Still meets sepsis criteria w/ tachypnia, tachycardia, leukocytosis  WBCs increased however suspect remain elevated due to steroids  Plan:  · Continue to monitor vitals     * Pneumonia due to COVID-19 virus  Assessment & Plan  Lab Results   Component Value Date    SARSCOV2 Positive (A) 01/22/2021       Patient presenting with symptoms of COVID-19 SYMPTOMS: shortness of breath  Patient experiencing worsening SOB over the past 2-3 days with subjective fevers, chills, and non-productive cough  Workup:  · Imaging:  Xr Chest 1 View Portable    Result Date: 1/22/2021  Impression: Bilateral airspace opacities  In the setting of clinically suspected/proven COVID-19, this plain film appearance while nonspecific, can be seen in cases of viral pneumonia such as COVID-19   Workstation performed: [id number]       · Labs:     Recent Labs     01/26/21  0646   FERRITIN 815*    7*   DDIMER 0 55*     D-Dimer elevated however WNL when correcting for age   Ferritin elevated   CRP elevated  BNP + Troponin WNL  Procal -X2    No results for input(s): PROCALCITONI in the last 72 hours  · Oxygen:  Saturating 93% on 2 liters/min via nasal cannula   Titrated down from 4L  Was able to tolerate room air with oxygen saturations in the low 90s, however when ambulating desaturates  · Ambulatory pulse ox conducted: Patient desaturates to mid 80s   · Home O2 evaluation performed this AM and patient requires home O2    COVID Stage: MODERATE    Plan:  · Discontinued antibiotics  · Home O2 evaluation tomorrow   · Continue moderate protocol    · Meds  · Vitamin D3 2000 units daily  · Vitamin C 1000 BID  · Zinc 220 mg x 7 days then multivitamin qd  · Famotidine 20 mg PO b i d  for acid suppressive therapy  · Remdesevir 200 mg IV 1 day, then 100 mg IV daily x4 days  Day #5  · Dexamethasone 6 mg IV daily times 10 days  Day #5  · Atorvastatin 40 mg PO HS    · Anticoamg lovonox BID  · Non-pharmacologic interventions  · PT and OT eval and treat  No needs from PT/OT  · Self-proning if able  · Incentive spirometry  · Consult Pulm/CC if worsening oxygen requirements        Discharging Resident Physician: Alex Maya DO  Attending: Sofia Quiñonez MD  PCP: Tangela Epperson DO  Admission Date: 2021  Discharge Date: 21    Disposition:     Home    Reason for Admission: COVID pneumonia     Consultations During Hospital Stay:  · None    Procedures Performed:     · None    Significant Findings / Test Results:     Xr Chest 1 View Portable    Result Date: 2021  · Impression: Bilateral airspace opacities  In the setting of clinically suspected/proven COVID-19, this plain film appearance while nonspecific, can be seen in cases of viral pneumonia such as COVID-19  Workstation performed: [id number]   ·     Incidental Findings:   · None     Test Results Pending at Discharge (will require follow up):    · None     Outpatient Tests Requested:  · None    Complications:  None    Hospital Course: Fabian Chu is a 76 y o  male patient who originally presented to the hospital on 1/22/2021 due to COVID pneumonia  Presents with worsening SOB over the past 2-3 days  Associated fevers, chills, nausea, and productive cough  Went to [facility] and was prescribed amoxicillin for assumed pneumonia  No sick contacts  Unsure of past medical history  Does not follow-up with a PCP regularly  Diagnosed with COVID requiring supplemental oxygen  Started on mild COVID pathway  Received 5 days of remdesivir and decadron  Will continue on prednisone for additional 5 days and supplemental oxygen based on home o2 eval  Patient requires 4L while ambulating  Condition at Discharge: good     Discharge Day Visit / Exam:     Subjective:  No new complaints  States that he is ready for discharge today  Vitals: Blood Pressure: 125/75 (01/26/21 0654)  Pulse: 105 (01/26/21 0654)  Temperature: 99 2 °F (37 3 °C) (01/26/21 0654)  Temp Source: Oral (01/26/21 0654)  Respirations: 19 (01/26/21 0654)  Height: 5' 5" (165 1 cm) (01/23/21 1122)  Weight - Scale: 89 2 kg (196 lb 10 4 oz) (01/23/21 1122)  SpO2: 92 % (01/26/21 0654)  Exam:   Physical Exam  Constitutional:       General: He is not in acute distress  HENT:      Head: Normocephalic and atraumatic  Mouth/Throat:      Mouth: Mucous membranes are moist       Pharynx: Oropharynx is clear  Eyes:      Extraocular Movements: Extraocular movements intact  Pupils: Pupils are equal, round, and reactive to light  Neck:      Musculoskeletal: Normal range of motion  Cardiovascular:      Rate and Rhythm: Normal rate and regular rhythm  Heart sounds: No murmur  Pulmonary:      Effort: Pulmonary effort is normal  No respiratory distress  Breath sounds: Normal breath sounds  No wheezing or rales  Abdominal:      General: Abdomen is flat  Palpations: Abdomen is soft  Tenderness:  There is no abdominal tenderness  There is no guarding  Musculoskeletal: Normal range of motion  Skin:     General: Skin is warm and dry  Neurological:      General: No focal deficit present  Mental Status: He is alert and oriented to person, place, and time  Psychiatric:         Mood and Affect: Mood normal          Behavior: Behavior normal          Discussion with Family: Plan of care discussed with patient, wife, and daughter  Discharge instructions/Information to patient and family:   See after visit summary for information provided to patient and family  Provisions for Follow-Up Care:  See after visit summary for information related to follow-up care and any pertinent home health orders  Planned Readmission: None     Discharge Medications:  See after visit summary for reconciled discharge medications provided to patient and family        ** Please Note: This note has been constructed using a voice recognition system **

## 2021-01-26 NOTE — PLAN OF CARE
Problem: RESPIRATORY - ADULT  Goal: Achieves optimal ventilation and oxygenation  Description: INTERVENTIONS:  - Assess for changes in respiratory status  - Assess for changes in mentation and behavior  - Position to facilitate oxygenation and minimize respiratory effort  - Oxygen administered by appropriate delivery if ordered  - Initiate smoking cessation education as indicated  - Encourage broncho-pulmonary hygiene including cough, deep breathe, Incentive Spirometry  - Assess the need for suctioning and aspirate as needed  - Assess and instruct to report SOB or any respiratory difficulty  - Respiratory Therapy support as indicated  Outcome: Progressing     Problem: GASTROINTESTINAL - ADULT  Goal: Minimal or absence of nausea and/or vomiting  Description: INTERVENTIONS:  - Administer IV fluids if ordered to ensure adequate hydration  - Maintain NPO status until nausea and vomiting are resolved  - Nasogastric tube if ordered  - Administer ordered antiemetic medications as needed  - Provide nonpharmacologic comfort measures as appropriate  - Advance diet as tolerated, if ordered  - Consider nutrition services referral to assist patient with adequate nutrition and appropriate food choices  Outcome: Progressing  Goal: Maintains adequate nutritional intake  Description: INTERVENTIONS:  - Monitor percentage of each meal consumed  - Identify factors contributing to decreased intake, treat as appropriate  - Assist with meals as needed  - Monitor I&O, weight, and lab values if indicated  - Obtain nutrition services referral as needed  Outcome: Progressing     Problem: SAFETY ADULT  Goal: Patient will remain free of falls  Description: INTERVENTIONS:  - Assess patient frequently for physical needs  -  Identify cognitive and physical deficits and behaviors that affect risk of falls    -  [place] fall precautions as indicated by assessment   - Educate patient/family on patient safety including physical limitations  - Instruct patient to call for assistance with activity based on assessment  - Modify environment to reduce risk of injury  - Consider OT/PT consult to assist with strengthening/mobility  Outcome: Progressing  Goal: Maintain or return to baseline ADL function  Description: INTERVENTIONS:  -  Assess patient's ability to carry out ADLs; assess patient's baseline for ADL function and identify physical deficits which impact ability to perform ADLs (bathing, care of mouth/teeth, toileting, grooming, dressing, etc )  - Assess/evaluate cause of self-care deficits   - Assess range of motion  - Assess patient's mobility; develop plan if impaired  - Assess patient's need for assistive devices and provide as appropriate  - Encourage maximum independence but intervene and supervise when necessary  - Involve family in performance of ADLs  - Assess for home care needs following discharge   - Consider OT consult to assist with ADL evaluation and planning for discharge  - Provide patient education as appropriate  Outcome: Progressing  Goal: Maintain or return mobility status to optimal level  Description: INTERVENTIONS:  - Assess patient's baseline mobility status (ambulation, transfers, stairs, etc )    - Identify cognitive and physical deficits and behaviors that affect mobility  - Identify mobility aids required to assist with transfers and/or ambulation (gait belt, sit-to-stand, lift, walker, cane, etc )  - [place] fall precautions as indicated by assessment  - Record patient progress and toleration of activity level on Mobility SBAR; progress patient to next Phase/Stage  - Instruct patient to call for assistance with activity based on assessment  - Consider rehabilitation consult to assist with strengthening/weightbearing, etc   Outcome: Progressing     Problem: DISCHARGE PLANNING  Goal: Discharge to home or other facility with appropriate resources  Description: INTERVENTIONS:  - Identify barriers to discharge w/patient and caregiver  - Arrange for needed discharge resources and transportation as appropriate  - Identify discharge learning needs (meds, wound care, etc )  - Arrange for interpretive services to assist at discharge as needed  - Refer to Case Management Department for coordinating discharge planning if the patient needs post-hospital services based on physician/advanced practitioner order or complex needs related to functional status, cognitive ability, or social support system  Outcome: Progressing     Problem: Knowledge Deficit  Goal: Patient/family/caregiver demonstrates understanding of disease process, treatment plan, medications, and discharge instructions  Description: Complete learning assessment and assess knowledge base  Interventions:  - Provide teaching at level of understanding  - Provide teaching via preferred learning methods  Outcome: Progressing     Problem: Potential for Falls  Goal: Patient will remain free of falls  Description: INTERVENTIONS:  - Assess patient frequently for physical needs  -  Identify cognitive and physical deficits and behaviors that affect risk of falls  -  [place] fall precautions as indicated by assessment   - Educate patient/family on patient safety including physical limitations  - Instruct patient to call for assistance with activity based on assessment  - Modify environment to reduce risk of injury  - Consider OT/PT consult to assist with strengthening/mobility  Outcome: Progressing     Problem: Nutrition/Hydration-ADULT  Goal: Nutrient/Hydration intake appropriate for improving, restoring or maintaining nutritional needs  Description: Monitor and assess patient's nutrition/hydration status for malnutrition  Collaborate with interdisciplinary team and initiate plan and interventions as ordered  Monitor patient's weight and dietary intake as ordered or per policy  Utilize nutrition screening tool and intervene as necessary   Determine patient's food preferences and provide high-protein, high-caloric foods as appropriate       INTERVENTIONS:  - Monitor oral intake, urinary output, labs, and treatment plans  - Assess nutrition and hydration status and recommend course of action  - Evaluate amount of meals eaten  - Assist patient with eating if necessary   - Allow adequate time for meals  - Recommend/ encourage appropriate diets, oral nutritional supplements, and vitamin/mineral supplements  - Order, calculate, and assess calorie counts as needed  - Recommend, monitor, and adjust tube feedings and TPN/PPN based on assessed needs  - Assess need for intravenous fluids  - Provide specific nutrition/hydration education as appropriate  - Include patient/family/caregiver in decisions related to nutrition  Outcome: Progressing     Problem: Prexisting or High Potential for Compromised Skin Integrity  Goal: Skin integrity is maintained or improved  Description: INTERVENTIONS:  - Identify patients at risk for skin breakdown  - Assess and monitor skin integrity  - Assess and monitor nutrition and hydration status  - Monitor labs   - Assess for incontinence   - Turn and reposition patient  - Assist with mobility/ambulation  - Relieve pressure over bony prominences  - Avoid friction and shearing  - Provide appropriate hygiene as needed including keeping skin clean and dry  - Evaluate need for skin moisturizer/barrier cream  - Collaborate with interdisciplinary team   - Patient/family teaching  - Consider wound care consult   Outcome: Progressing

## 2021-01-26 NOTE — PLAN OF CARE
Problem: RESPIRATORY - ADULT  Goal: Achieves optimal ventilation and oxygenation  Description: INTERVENTIONS:  - Assess for changes in respiratory status  - Assess for changes in mentation and behavior  - Position to facilitate oxygenation and minimize respiratory effort  - Oxygen administered by appropriate delivery if ordered  - Initiate smoking cessation education as indicated  - Encourage broncho-pulmonary hygiene including cough, deep breathe, Incentive Spirometry  - Assess the need for suctioning and aspirate as needed  - Assess and instruct to report SOB or any respiratory difficulty  - Respiratory Therapy support as indicated  Outcome: Progressing     Problem: GASTROINTESTINAL - ADULT  Goal: Minimal or absence of nausea and/or vomiting  Description: INTERVENTIONS:  - Administer IV fluids if ordered to ensure adequate hydration  - Maintain NPO status until nausea and vomiting are resolved  - Nasogastric tube if ordered  - Administer ordered antiemetic medications as needed  - Provide nonpharmacologic comfort measures as appropriate  - Advance diet as tolerated, if ordered  - Consider nutrition services referral to assist patient with adequate nutrition and appropriate food choices  Outcome: Progressing     Problem: SAFETY ADULT  Goal: Patient will remain free of falls  Description: INTERVENTIONS:  - Assess patient frequently for physical needs  -  Identify cognitive and physical deficits and behaviors that affect risk of falls    -  [place] fall precautions as indicated by assessment   - Educate patient/family on patient safety including physical limitations  - Instruct patient to call for assistance with activity based on assessment  - Modify environment to reduce risk of injury  - Consider OT/PT consult to assist with strengthening/mobility  Outcome: Progressing     Problem: Potential for Falls  Goal: Patient will remain free of falls  Description: INTERVENTIONS:  - Assess patient frequently for physical needs  -  Identify cognitive and physical deficits and behaviors that affect risk of falls    -  [place] fall precautions as indicated by assessment   - Educate patient/family on patient safety including physical limitations  - Instruct patient to call for assistance with activity based on assessment  - Modify environment to reduce risk of injury  - Consider OT/PT consult to assist with strengthening/mobility  Outcome: Progressing

## 2021-01-26 NOTE — ASSESSMENT & PLAN NOTE
Lab Results   Component Value Date    SARSCOV2 Positive (A) 2021       Patient presenting with symptoms of COVID-19 SYMPTOMS: shortness of breath  Patient experiencing worsening SOB over the past 2-3 days with subjective fevers, chills, and non-productive cough  Workup:  · Imaging:  Xr Chest 1 View Portable    Result Date: 2021  Impression: Bilateral airspace opacities  In the setting of clinically suspected/proven COVID-19, this plain film appearance while nonspecific, can be seen in cases of viral pneumonia such as COVID-19  Workstation performed: [id number]       · Labs:     Recent Labs     21  0646   FERRITIN 815*    7*   DDIMER 0 55*     D-Dimer elevated however WNL when correcting for age   Ferritin elevated   CRP elevated  BNP + Troponin WNL  Procal -X2    No results for input(s): PROCALCITONI in the last 72 hours  · Oxygen:  Saturating 93% on 2 liters/min via nasal cannula   Titrated down from 4L  Was able to tolerate room air with oxygen saturations in the low 90s, however when ambulating desaturates  · Ambulatory pulse ox conducted: Patient desaturates to mid 80s   · Home O2 evaluation performed this AM and patient requires home O2    COVID Stage: MODERATE    Plan:  · Discontinued antibiotics  · Home O2 evaluation tomorrow   · Continue moderate protocol    · Meds  · Vitamin D3 2000 units daily  · Vitamin C 1000 BID  · Zinc 220 mg x 7 days then multivitamin qd  · Famotidine 20 mg PO b i d  for acid suppressive therapy  · Remdesevir 200 mg IV 1 day, then 100 mg IV daily x4 days  Day #5  · Dexamethasone 6 mg IV daily times 10 days  Day #5  · Atorvastatin 40 mg PO HS    · Anticoamg lovonox BID  · Non-pharmacologic interventions  · PT and OT eval and treat  No needs from PT/OT     · Self-proning if able  · Incentive spirometry  · Consult Pulm/CC if worsening oxygen requirements

## 2021-01-26 NOTE — CASE MANAGEMENT
CM received message from [employer] that it is ok to deliver portable O2 from consignment for patient  CM also faxed Rxs for glucometer, test strips and lancets to [facility]'s [facility]  CM contacted patient to discuss discharge plan but as he is primarily [address] speaking he asked CM to contact his spouse or daughter to discuss  CM contacted daughter Zechariah Harding, she is with her mother and both participated in call  Patient resides home with his spouse and daughter and they are able to provide any assistance needed  Family is aware that patient requires Home O2 and they would like to obtain same from [facility]  Zechariah Harding reports patient already has a pulse ox at home; pulse ox instructions placed on AVS  Family will pick patient up upon discharge and requested all medications be sent to [facility] pharmacy on [address] [facility]  IMM reviewed with patient's spouse and daughter  Patient's spouse and daughter agree with discharge determination      CM received call from [address], as patient has Medicare Part B his Rxs need to be specific and include the following: specific , directions, diagnosis code and specific quantity  SLIM aware and will reorder glucometer, test strips and lancets  O2 tank provided to [facility] for room delivery  CM provided [employer] with daughter's contact information for scheduling for home supplies  [facility] also [facility]  Family to  around 4 pm as per SLIM

## 2021-01-26 NOTE — CASE MANAGEMENT
SURAJ contacted [facility] to follow up on diabetic testing supplies  SURAJ spoke with Marin Mccord in the pharmacy and he stated that because patient is not on insulin, Medicare will cover testing only once/day  Additionally, the orders will need to be placed by someone who is [employer] certified  Marin Mccord reports he ran the test strips through to check the cost and copay courtney be $8 32 once the Rx is written correctly  He will call CM if any issues arise once he fills the other orders  SLIM aware that orders need to be re-written and Dr Shaq Crespo will order same  Rxs refaxed by SURAJ and originals placed on chart for provision to patient upon discharge  RN aware

## 2021-01-26 NOTE — ASSESSMENT & PLAN NOTE
Lab Results   Component Value Date    HGBA1C 8 6 (H) 01/22/2021     · 204 POA  No prior history of diagnosed DM  · Hemoglobin A1c 8 6  based on this finding newly diagnosed DM Type II  Plan:  · Will start on 1000mg metformin BID  Glucometer, lancets, and test strips were prescribed and sent to patient's pharmacy

## 2021-01-26 NOTE — CASE MANAGEMENT
CM informed by SLIM that patient will discharge home today  RT completed Home o2 eval today and patient does qualify for Home O2  RT eval and O2 Rx sent to [facility] via [facility]; CM awaiting response  Patient was screened by PT/OT and is independent with mobility in his room  No skilled therapy needs upon discharge  Patient is going to discharge on Metformin and will need to check his blood glucose  SLIM to order glucometer, test strips and lancets and CM will fax Rxs to patient's home pharmacy when completed  As per RN/SLIM rounds, no CM needs anticipated aside from O2 and Glucometer and supplies  CM to follow up with patient

## 2021-01-26 NOTE — ASSESSMENT & PLAN NOTE
Recent Labs     01/24/21  0938 01/25/21  0453 01/26/21  0646   SODIUM 136 136 131*     · 131 POA  133 when correcting for hyperglycemia  · 1000cc bolus NS in the ED for sepsis criteria      Plan:  · Discontinue IVF  Encourage oral hydration

## 2021-01-26 NOTE — DISCHARGE INSTRUCTIONS
Diabetes y nutrición   LO QUE NECESITA SABER:   ¿Por qué son importantes los planes de nutrición? Los planes de nutrición ayudan a establecer patrones de alimentación saludable que [employer]  Los planes de nutrición y ejercicio regular ayudan a mantener estable matt niveles de azúcar en la saida  Sergio More a retrasar o prevenir las complicaciones de la diabetes, arabella la enfermedad renal diabética  ¿Cómo elaboro un plan de nutrición? Un dietista le ayudará a crear un plan de nutrición para satisfacer matt necesidades y las de [address]  El objetivo es que usted alcance o [place] un peso y niveles de azúcar en la saida, presión arterial y lípidos saludables  Debería reunirte con el dietista al menos 1 vez al [place]  Aprenderá lo siguiente:  · Arabella los alimentos afectan love nivel de azúcar en la saida    · Cómo generar hábitos de alimentación saludables    · Cómo elegir los alimentos en función de love nivel de [place], peso y niveles de glucosa    · Cómo puede integrar matt comidas preferidas a love plan    · Alimentos que contienen carbohidratos (azúcares y almidones), incluyendo carbohidratos simples y complejos    · Cómo hacer un seguimiento de todos los carbohidratos    · El tamaño correcto de las porciones de cada alimento    · Cambios que puede hacer en love plan si Isidro Said o está amamantando    ¿Cuáles son algunos consejos que puedo aplicar antes de reunirme con el dietista? · No se salte ninguna comida  La meta es mantener estable el nivel de azúcar en la [place]  Love nivel de azúcar en la saida puede bajar demasiado si ha recibido insulina y no ha comido  · Consuma más alimentos ricos en fibra, arabella frutas y verduras frescas o congeladas, panes integrales y frijoles  La fibra ayuda a controlar o reducir los niveles de azúcar en la saida y de [place]  Elija frutas enteras en lugar de jugo de fruta tanto arabella sea posible  Es posible que se añada azúcar al jugo y se elimine la [place]  · Seleccione grasas saludables para el corazón  Los alimentos con alto contenido de grasas saludables para el corazón incluyen el aceite de [place], las nueces, los aguacates y los pescados grasos, dariel el salmón y el atún  Los alimentos con alto contenido de grasas no saludables incluyen la carne chase, los productos lácteos enteros y la margarina blanda  Las grasas no saludables pueden aumentar el riesgo de enfermedades cardíacas, aumentar el colesterol mary y reducir el gil  · Elija los carbohidratos complejos  Los alimentos con carbohidratos complejos incluyen el arroz integral, los panes y cereales integrales y los frijoles cocidos  Los alimentos con carbohidratos simples incluyen el pan billy, el arroz billy, la mayoría de los cereales fríos y los refrigerios  Robles plan incluirá la cantidad de carbohidratos a consumir de shawn vez o en un día  Robles nivel de azúcar en la saida puede subir demasiado si come muchos carbohidratos de shawn vez  Los niveles de azúcar en la saida no aumentan o disminuyen tan rápidamente con los carbohidratos complejos dariel con los carbohidratos simples  Elija carbohidratos complejos siempre que sea posible  · Consuma menos sodio (sal)  El riesgo de presión arterial christiane aumenta con los alimentos con alto contenido de [place]  Limite los alimentos altos en sodio, dariel, por ejemplo, salsa de soya, jailyn tostadas y sopas enlatadas  No añada sal a la comida que usted prepare  Restrinja el uso de sal de bonilla  Melissa las etiquetas para que no tengan más de 2300 miligramos de sodio en un día  · Limite los edulcorantes artificiales  Pueden encontrarse en alimentos o bebidas, dariel los refrescos de dieta u otras bebidas bajas en calorías  Los edulcorantes artificiales son bajos en calorías  Pueden ayudar a reducir Charo Riis y los carbohidratos en general  Es importante no consumir más calorías de otros alimentos para compensar las calorías [place]   Los edulcorantes artificiales no tienen ningún tipo de nutrición  Coma alimentos integrales y maya agua tanto dariel sea posible  Love plan puede incluir bebidas con edulcorantes artificiales por un corto tiempo  [place] puede ayudarte a pasar de las bebidas con alto contenido de azúcar al agua  · Utilice el método del plato para cada comida  [place]ilma método puede ayudar a comer la cantidad correcta de carbohidratos y [employer] niveles de azúcar en la saida bajo control  ? Dibuje shawn línea imaginaria en medio de un plato de comida de 9 pulgadas  En un lado, dibuje otra línea para dividir richelle sección a la mitad  El plato tendrá [facility] sección jose manuel y Juan Ramon  ? Llene la sección más jose manuel con verduras sin almidón  Estos incluyen al brócoli, espinacas, pepino, pimientos, coliflor y tomates  ? Agregue un almidón a shawn de las secciones pequeñas  Los almidones [employer], arroz, panes de lina entero, tortillas, [place], jailyn y frijoles  ? Agregue carne u otra mj de proteína a la otra sección pequeña del plato  Por ejemplo, maury o pavo sin piel, pescado, carne de res o de BOONOO BOONOO, queso bajo en grasa, tofu y [place]  ? Agregue productos lácteos o fruta al lado de love plato si olve plan de alimentación lo permite  Los ejemplos de productos lácteos incluyen [employer] o del 1% o yogur bajo en grasa  Si usted no mellissa leche ni consume productos lácteos, es posible que pueda agregar otra porción de almidón en vez de eso  ? Amidon shawn bebida baja en calorías o sin calorías con love comida  Por ejemplo, agua o café o té sin azúcar  ¿Cuáles son los riesgos del alcohol? El alcohol puede causar hipoglucemia (nivel de azúcar en la saida muy bajo), especialmente si [place] insulina  El alcohol puede causar niveles altos de azúcar en la saida y de presión arterial, y aumento de peso si usricardo jorge demasiado  [place] de [address] y los hombres de 72 años o más deben limitar el consumo de alcohol a 1 bebida por día   Los hombres de 20 [address] de alcohol a 2 tragos al día  Un trago equivale a 12 onzas de cerveza, 5 onzas de vino o 1 onza y ½ de licor  La hipoglucemia puede ocurrir horas después de beber alcohol  Compruebe love nivel de azúcar en la saida megan varias horas después de beber alcohol  Lleve consigo shawn mj de carbohidratos de acción rápida en eliseo de que love nivel baje demasiado  Necesita atención inmediata si tiene signos o síntomas de hipoglucemia, dariel sudor, confusión o desmayos  ¿Por qué es importante mantener un peso saludable? Un peso saludable puede ayudarlo a controlar love diabetes  Usted puede mantener un peso saludable con un plan de nutrición y ejercicio  Consulte con love médico cuánto debería pesar  [place] que lo ayude a crear un plan para bajar de peso si tiene sobrepeso  Juntos pueden definir metas de pérdida y de mantenimiento del [place]  Llame al Marrianne Prude de emergencias local (911 en los [place]) si:  · Tiene alguno de los siguientes signos de un ataque cardíaco:      ? Estrujamiento, presión o tensión en love pecho    ? Usted también podría presentar alguno de los siguientes:     § Malestar o dolor en love espalda, josé, mandíbula, abdomen, o brazo    § Falta de [employer] o vómitos    § Desvanecimiento o sudor frío repentino      ¿Cuándo tj buscar atención inmediata? · Usted tiene un nivel bajo azúcar en la saida y el problema no mejora con el [place]  Los síntomas son dificultad para pensar, latidos cardíacos candy y sudoración  · Love nivel de azúcar en la saida está por encima de 240 mg/dl y no baja dentro de los 15 minutos del tratamiento  · Usted tiene cetonas en la saida o en la orina  · Tiene náuseas o vómitos y no puede retener alimentos o líquidos en el estómago  · Usted tiene visión borrosa o doble      · Love aliento tiene un [employer] a frutas o love respiración es superficial     ¿Cuándo tj llamar al médico o al equipo de cuidado de la diabetes? · Bambi niveles de azúcar en la saida son superiores a las metas fijadas  · Usted tiene bajos niveles de azúcar en love saida frecuentemente  · Usted tiene problemas para sobrellevar love diabetes o se siente ansioso o deprimido  · Usted tiene preguntas o inquietudes acerca de love condición o cuidado  ACUERDOS SOBRE LOVE CUIDADO:   Usted tiene el derecho de ayudar a planear love cuidado  Aprenda todo lo que pueda sobre love condición y dariel darle tratamiento  Discuta bambi opciones de tratamiento con bambi médicos para decidir el cuidado que usted desea recibir  Usted siempre tiene el derecho de rechazar el tratamiento  Esta información es sólo para uso en educación  Love intención no es darle un consejo médico sobre enfermedades o tratamientos  Colsulte con love Neena Greg farmacéutico antes de seguir cualquier régimen médico para saber si es seguro y efectivo para usted  © Copyright [address] Information is for End User's use only and may not be sold, redistributed or otherwise used for commercial purposes  All illustrations and images included in CareNotes® are the copyrighted property of A D A M , Inc  or [address] [place] [address]    Diabetes y nutrición   LO QUE NECESITA SABER:   Los planes de nutrición ayudan a establecer patrones de alimentación saludable que mejoren la tigist  Los planes de nutrición y ejercicio regular ayudan a mantener estable bambi niveles de azúcar en la saida  Retia Maciej a retrasar o prevenir las complicaciones de la diabetes, dariel la enfermedad renal diabética  [facility]CCIONES [facility]BRE [facility]:   Llame al número de emergencias local (911 en los [place]) si:  · Tiene alguno de los siguientes signos de un ataque cardíaco:      ? Estrujamiento, presión o tensión en love pecho    ?  Usted también podría presentar alguno de los siguientes:     § Malestar o dolor en love espalda, josé, mandíbula, abdomen, o brazo    [address] o vómitos    § Desvanecimiento o sudor frío repentino      Busque atención médica de inmediato si:  · Usted tiene un nivel bajo azúcar en la saida y el problema no mejora con el [place]  Los síntomas son dificultad para pensar, latidos cardíacos cnady y sudoración  · Love nivel de azúcar en la saida está por encima de 240 mg/dl y no baja dentro de los 15 minutos del tratamiento  · Usted tiene cetonas en la saida o en la orina  · Tiene náuseas o vómitos y no puede retener alimentos o líquidos en el estómago  · Usted tiene visión borrosa o doble  · Love aliento tiene un [employer] a frutas o love respiración es superficial     Llame a love médico o al equipo de atención diabética si:  · Bambi niveles de azúcar en la saida son superiores a las metas fijadas  · Usted tiene bajos niveles de azúcar en love saida frecuentemente  · Usted tiene problemas para sobrellevar love diabetes o se siente ansioso o deprimido  · Usted tiene preguntas o inquietudes acerca de love condición o cuidado  Un dietista lo ayudará a crear un plan de nutrición para satisfacer bambi necesidades y las de [address] [place]  El objetivo es que usted alcance o [place] un peso y niveles de azúcar en la saida, presión arterial y lípidos saludables  Debería reunirte con el dietista al menos 1 vez al [employer]  Aprenderá lo siguiente:  · Arabella los alimentos afectan love nivel de azúcar en la saida    · Cómo generar hábitos de alimentación saludables    · Cómo elegir los alimentos en función de love nivel de [place] y niveles de glucosa    · Cómo puede integrar bambi comidas preferidas a love plan    · Cómo hacer un seguimiento de los carbohidratos    · El tamaño correcto de las porciones de cada alimento    · Cambios que puede hacer en love plan si Isidro Said o está amamantando    No se salte ninguna comida: La meta es mantener estable el nivel de azúcar en la [place]   Love nivel de azúcar en la saida puede bajar demasiado si ha recibido insulina y no ha comido  Consuma más alimentos ricos en fibra: Los alimentos altos en fibras incluyen frutas y verduras frescas o congeladas, panes integrales y frijoles  La fibra ayuda a controlar o reducir los niveles de azúcar en la saida y de [place]  Elija frutas enteras en lugar de jugo de fruta tanto dariel sea posible  Es posible que se añada azúcar al jugo y se elimine la [place]  Seleccione grasas saludables para el corazón: Los alimentos con alto contenido de grasas saludables para el corazón incluyen el aceite de [place], las nueces, los aguacates y los pescados grasos, dariel el salmón y el atún  Los alimentos con alto contenido de grasas no saludables incluyen la carne chase, los productos lácteos enteros y la margarina blanda  Las grasas no saludables pueden aumentar el riesgo de enfermedades cardíacas, aumentar el colesterol mary y reducir el gil  Elija los carbohidratos complejos: Los alimentos con carbohidratos complejos incluyen el arroz integral, los panes y cereales integrales y los frijoles cocidos  Los alimentos con carbohidratos simples incluyen el pan billy, el arroz billy, la mayoría de los cereales fríos y los refrigerios  [place] plan incluirá la cantidad de carbohidratos a consumir de shawn vez o en un día  [place] nivel de azúcar en la saida puede subir demasiado si come muchos carbohidratos de shawn vez  Los niveles de azúcar en la saida no aumentan o disminuyen tan rápidamente con los carbohidratos complejos dareil con los carbohidratos simples  Elija carbohidratos complejos siempre que sea posible  Consuma menos sodio (sal): El riesgo de presión arterial christiane aumenta con los alimentos con alto contenido de [place]  Limite los alimentos altos en sodio, dariel, por ejemplo, salsa de soya, jailyn tostadas y sopas enlatadas  No añada sal a la comida que usted prepare  Restrinja el uso de sal de bonilla  Melissa las etiquetas para que no tengan más de 2300 miligramos de sodio en un día       Limite los edulcorantes artificiales: Pueden encontrarse en alimentos o bebidas, dariel los refrescos de dieta u otras bebidas bajas en calorías  Los edulcorantes artificiales son bajos en calorías  Pueden ayudar a reducir Macario Tam y los carbohidratos en general  Es importante no consumir más calorías de otros alimentos para compensar las calorías [place]  Los edulcorantes artificiales no tienen ningún tipo de nutrición  Coma alimentos integrales y maya agua tanto dariel sea posible  Love plan puede incluir bebidas con edulcorantes artificiales por un corto tiempo  [place] puede ayudarte a pasar de las bebidas con alto contenido de azúcar al agua  Utilice el método del plato para cada comida: [place] método puede ayudar a comer la cantidad correcta de carbohidratos y [employer] niveles de azúcar en la saida bajo control  · Dibuje shawn línea imaginaria en medio de un plato de comida de 9 pulgadas  En un lado, dibuje otra línea para dividir richelle sección a la mitad  El plato tendrá [facility] [place] sección jose manuel y Juan Ramon  · Llene la sección más jose manuel con verduras sin almidón  Estos incluyen al brócoli, espinacas, pepino, pimientos, coliflor y tomates  · Agregue un almidón a shawn de las secciones pequeñas  Los almidones [employer], arroz, panes de lina entero, tortillas, [place], jailyn y frijoles  · Agregue carne u otra mj de proteína a la otra sección pequeña del plato  Por ejemplo, maury o pavo sin piel, pescado, carne de res o de BOONOO BOONOO, queso bajo en grasa, tofu y [place]  · Agregue productos lácteos o fruta al lado de love plato si love plan de alimentación lo permite  Los ejemplos de productos lácteos incluyen [employer] o del 1% o yogur bajo en grasa  Si usted no mellissa leche ni consume productos lácteos, es posible que pueda agregar otra porción de almidón en vez de eso  · [place] shawn bebida baja en calorías o sin calorías con love comida  Por ejemplo, agua o café o té sin azúcar       Conozca los riesgos si decide beber alcohol: El alcohol puede causar hipoglucemia (bajo nivel de azúcar en la saida), especialmente si [place] insulina  El alcohol puede causar niveles altos de azúcar en la saida y de presión arterial, y aumento de peso si usted jorge demasiado  [place] de [address] y los hombres de 72 años o más deben limitar el consumo de alcohol a 1 bebida por día  Los hombres de [address] de alcohol a 2 tragos al día  Un trago equivale a 12 onzas de cerveza, 5 onzas de vino o 1 onza y ½ de licor  La hipoglucemia puede ocurrir horas después de beber alcohol  Compruebe love nivel de azúcar en la saida megan varias horas después de beber alcohol  Lleve consigo shawn mj de carbohidratos de acción rápida en eliseo de que love nivel baje demasiado  Necesita atención inmediata si tiene signos o síntomas de hipoglucemia, dariel sudor, confusión o desmayos  Mantenga un peso saludable: Un peso saludable puede ayudarlo a controlar love diabetes  Usted puede mantener un peso saludable con un plan de nutrición y ejercicio  Consulte con love médico cuánto debería pesar  [place] que lo ayude a crear un plan para bajar de peso si tiene sobrepeso  Juntos pueden definir metas de pérdida y de mantenimiento del [place]  Acuda a matt consultas de control con love médico o con el equipo de diabetes según le indicaron: Anote matt preguntas para que se acuerde de hacerlas megan mtat visitas  © Copyright [address] Information is for End User's use only and may not be sold, redistributed or otherwise used for commercial purposes  All illustrations and images included in CareNotes® are the copyrighted property of A D A Saiguo  or [address] [place] [address] es sólo para uso en educación  Love intención no es darle un consejo médico sobre enfermedades o tratamientos   Colsulte con love Leydi Clipper farmacéutico antes de seguir cualquier régimen médico para saber si es seguro y [place] para usted     Diabetes y nutrición   LO QUE NECESITA SABER:   Los planes de nutrición ayudan a establecer patrones de alimentación saludable que [employer]  Los planes de nutrición y ejercicio regular ayudan a mantener estable bambi niveles de azúcar en la saida  [place] a retrasar o prevenir las complicaciones de la diabetes, dariel la enfermedad renal diabética  [facility]CCIONES [facility]BRE [facility]:   Llame al número de emergencias local (911 en los [place]) si:  · Tiene alguno de los siguientes signos de un ataque cardíaco:      ? Estrujamiento, presión o tensión en love pecho    ? Usted también podría presentar alguno de los siguientes:     § Malestar o dolor en love espalda, josé, mandíbula, abdomen, o brazo    § Falta de [employer] o vómitos    § Desvanecimiento o sudor frío repentino      Regrese a la carla de emergencias si:  · Usted tiene un nivel bajo azúcar en la saida y el problema no mejora con el tratamiento  Los síntomas son dificultad para pensar, latidos cardíacos candy y sudoración  · Love nivel de azúcar en la saida está por encima de 240 mg/dl y no baja dentro de los 15 minutos del tratamiento  · Usted tiene cetonas en la saida o en la orina  · Tiene náuseas o vómitos y no puede retener alimentos o líquidos en el estómago  · Usted tiene visión borrosa o doble  · Love aliento tiene un [employer] a frutas o love respiración es superficial     Llame a love médico o al equipo de atención diabética si:  · Bambi niveles de azúcar en la saida son superiores a las metas fijadas  · Usted tiene bajos niveles de azúcar en love saida frecuentemente  · Usted tiene problemas para sobrellevar love diabetes o se siente ansioso o deprimido  · Usted tiene preguntas o inquietudes acerca de love condición o cuidado  Un dietista lo ayudará a crear un plan de nutrición para satisfacer bambi necesidades y las de [address]   El objetivo es que usted alcance o mantenga un peso y [place] de azúcar en la saida, presión arterial y lípidos saludables  Debería reunirte con el dietista al menos 1 vez al [employer]  Aprenderá lo siguiente:  · [place] los alimentos afectan love nivel de azúcar en la saida    · Cómo generar hábitos de alimentación saludables    · Cómo elegir los alimentos en función de love nivel de [place], [place] y niveles de glucosa    · Cómo puede integrar matt comidas preferidas a love plan    · Cómo hacer un seguimiento de los carbohidratos    · El tamaño correcto de las porciones de cada alimento    · Cambios que puede hacer en love plan si Vassie Locket o está amamantando    No se salte ninguna comida: La meta es mantener estable el nivel de azúcar en la [place]  Love nivel de azúcar en la saida puede bajar demasiado si ha recibido insulina y no ha comido  Consuma más alimentos ricos en fibra: Los alimentos altos en fibras incluyen frutas y verduras frescas o congeladas, panes integrales y frijoles  La fibra ayuda a controlar o reducir los niveles de azúcar en la saida y de [place]  Elija frutas enteras en lugar de jugo de fruta tanto dariel sea posible  Es posible que se añada azúcar al jugo y se elimine la [place]  Seleccione grasas saludables para el corazón: Los alimentos con alto contenido de grasas saludables para el corazón incluyen el aceite de [place], las nueces, los aguacates y los pescados grasos, dariel el salmón y el atún  Los alimentos con alto contenido de grasas no saludables incluyen la carne chase, los productos lácteos enteros y la margarina blanda  Las grasas no saludables pueden aumentar el riesgo de enfermedades cardíacas, aumentar el colesterol mary y reducir el gil  Elija los carbohidratos complejos: Los alimentos con carbohidratos complejos incluyen el arroz integral, los panes y cereales integrales y los frijoles cocidos  Los alimentos con carbohidratos simples incluyen el pan billy, el arroz billy, la mayoría de los cereales fríos y los refrigerios  [place] plan incluirá la cantidad de carbohidratos a consumir de shawn vez o en un día  [place] nivel de azúcar en la saida puede subir demasiado si come muchos carbohidratos de shawn vez  Los niveles de azúcar en la saida no aumentan o disminuyen tan rápidamente con los carbohidratos complejos dariel con los carbohidratos simples  Elija carbohidratos complejos siempre que sea posible  Consuma menos sodio (sal): El riesgo de presión arterial christiane aumenta con los alimentos con alto contenido de [place]  Limite los alimentos altos en sodio, dariel, por ejemplo, salsa de soya, jailyn tostadas y sopas enlatadas  No añada sal a la comida que usted prepare  Restrinja el uso de sal de bonilla  Melissa las etiquetas para que no tengan más de 2300 miligramos de sodio en un día  Limite los edulcorantes artificiales: Pueden encontrarse en alimentos o bebidas, dariel los refrescos de dieta u otras bebidas bajas en calorías  Los edulcorantes artificiales son bajos en calorías  Pueden ayudar a reducir Pansy Asai y los carbohidratos en general  Es importante no consumir más calorías de otros alimentos para compensar las calorías [place]  Los edulcorantes artificiales no tienen ningún tipo de nutrición  Coma alimentos integrales y maya agua tanto dariel sea posible  [place] plan puede incluir bebidas con edulcorantes artificiales por un corto tiempo  [place] puede ayudarte a pasar de las bebidas con alto contenido de azúcar al agua  Utilice el método del plato para cada comida: [place] método puede ayudar a comer la cantidad correcta de carbohidratos y [employer] niveles de azúcar en la saida bajo control  · Dibuje shawn línea imaginaria en medio de un plato de comida de 9 pulgadas  En un lado, dibuje otra línea para dividir richelle sección a la mitad  El plato tendrá [place] sección jose manuel y Juan Ramon  · Llene la sección más jose manuel con verduras sin almidón  Estos incluyen al brócoli, espinacas, pepino, pimientos, coliflor y tomates      · Agregue un almidón a shawn de las secciones pequeñas  Los almidones [employer], arroz, panes de lina entero, tortillas, [place], jailyn y frijoles  · Agregue carne u otra mj de proteína a la otra sección pequeña del plato  Por ejemplo, maury o pavo sin piel, pescado, carne de res o de BOONOO BOONOO, queso bajo en grasa, tofu y [place]  · Agregue productos lácteos o fruta al lado de love plato si love plan de alimentación lo permite  Los ejemplos de productos lácteos incluyen [employer] o del 1% o yogur bajo en grasa  Si usted no mellissa leche ni consume productos lácteos, es posible que pueda agregar otra porción de almidón en vez de eso  · [place] shawn bebida baja en calorías o sin calorías con love comida  Por ejemplo, agua o café o té sin azúcar  Conozca los riesgos si decide beber alcohol: El alcohol puede causar hipoglucemia (bajo nivel de azúcar en la saida), especialmente si [place] insulina  El alcohol puede causar niveles altos de azúcar en la saida y de presión arterial, y aumento de peso si usted jorge demasiado  [place] [address] y los hombres de 72 años o más deben limitar el consumo de alcohol a 1 bebida por día  Los hombres de [address] de alcohol a 2 tragos al día  Un trago equivale a 12 onzas de cerveza, 5 onzas de vino o 1 onza y ½ de licor  La hipoglucemia puede ocurrir horas después de beber alcohol  Compruebe love nivel de azúcar en la saida megan varias horas después de beber alcohol  Lleve consigo shawn mj de carbohidratos de acción rápida en eliseo de que love nivel baje demasiado  Necesita atención inmediata si tiene signos o síntomas de hipoglucemia, dariel sudor, confusión o desmayos  Mantenga un peso saludable: Un peso saludable puede ayudarlo a controlar love diabetes  Usted puede mantener un peso saludable con un plan de nutrición y ejercicio  Consulte con love médico cuánto debería pesar  [place] que lo ayude a crear un plan para bajar de peso si tiene sobrepeso   Juntos pueden definir metas de pérdida y de mantenimiento del [place]  Acuda a matt consultas de control con love médico o con el equipo de diabetes según le indicaron: Anote matt preguntas para que se acuerde de hacerlas megan matt visitas  © Copyright [facility] [employer] Information is for End User's use only and may not be sold, redistributed or otherwise used for commercial purposes  All illustrations and images included in CareNotes® are the copyrighted property of A D A M , Inc  or [address] [place] [address] es sólo para uso en educación  Love intención no es darle un consejo médico sobre enfermedades o tratamientos  Colsulte con love Leydi Clipper farmacéutico antes de seguir cualquier régimen médico para saber si es seguro y efectivo para usted

## 2021-01-26 NOTE — DISCHARGE INSTR - AVS FIRST PAGE
Dear Kevin Landers,     It was our pleasure to care for you here at [facility]  It is our hope that we were always able to exceed the expected standards for your care during your stay  You were hospitalized due to COVID pneumonia  You were cared for on the 3rd floor by Tamara Dunn DO under the service of Danni Brown MD with the [facility] who covers for your primary care physician (PCP), uLh Harrison DO, while you were hospitalized  If you have any questions or concerns related to this hospitalization, you may contact us at [phone]  For follow up as well as any medication refills, we recommend that you follow up with your primary care physician  A registered nurse will reach out to you by phone within a few days after your discharge to answer any additional questions that you may have after going home  However, at this time we provide for you here, the most important instructions / recommendations at discharge:     · Notable Medication Adjustments -   · Please continue atorvastatin 40mg tablets daily for a total of 2 weeks  · Please continue famotidine 20mg tablet twice daily for 5 more days  · Please begin metformin 1000mg twice daily with meals  This medications is for your newly diagnosed diabetes  · Please continue prednisone 40 mg (two tablets) once daily for 5 more days  · Testing Required after Discharge -   · Please follow-up with your PCP and repeat Hemoglobin A1c in 3 months after starting metformin  · Important follow up information -   · Please follow-up with [facility] to establish care with a primary care physician  [phone]    · Please review this entire after visit summary as additional general instructions including medication list, appointments, activity, diet, any pertinent wound care, and other additional recommendations from your care team that may be provided for you        Sincerely, Johnathon Pendleton, DO

## 2021-01-26 NOTE — RESPIRATORY THERAPY NOTE
Home Oxygen Qualifying Test       Patient name: Yonathan Rinaldi        : 1955   Date of Test:  2021  Diagnosis: Covid 19     Home Oxygen Test:    **Medicare Guidelines require item(s) 1-5 on all ambulatory patients or 1 and 2 on non-ambulatory patients  1   Baseline SPO2 on Room Air at rest: 90-91%  2   SPO2 during exercise on Room Air: 84-86%  During exercise monitor SpO2  If SPO2 increases >=89% with ambulation do not add supplemental             oxygen  If <= 88% on room air add O2 via NC and titrate patient  Patient must be ambulated with O2 and titrated to > 88% with exertion  3   SPO2 on Oxygen at rest: 91% 4 lpm     4   SPO2 during exercise on Oxygen: 88-90% a liter flow of 4 lpm     5   Exercise performed:          walking, duration 6 (min)          [x]  Supplemental Home Oxygen is indicated  []  Client does not qualify for home oxygen  Respiratory Additional Notes: Pt had desaturation while on RA ambulating  Placed on 4L NC  No further desaturation while ambulating       Jamil Gtz, RT

## 2021-01-26 NOTE — DISCHARGE INSTR - OTHER ORDERS
 If your pulse ox is < 90  on room air- rest for 5 minutes and take deep breaths through your nose with the oxygen in place  Make sure your finger is warm (cold fingers will give falsely low readings)  After 5 minutes, recheck your pulse ox with oxygen on    If your pulse ox remains below 90:  o If excessively short of breath, call your PCP or pulmonologist AND consider further evaluation in the Emergency Department- mask must be worn to enter ED  o if not feeling excessively short of breath, place a call to your primary care physician or pulmonologist 24/7 (if after office hours the answering service  will contact the on call physician who will call you back)  It is important to have your appointment with your doctor within 30 days of discharge from the hospital to continue qualifying for insurance coverage of home oxygen  If you are no longer using the oxygen, notify your doctor immediately  Coverage for home oxygen is based on your insurance's determination

## 2021-01-27 ENCOUNTER — TELEPHONE (OUTPATIENT)
Dept: INTERNAL MEDICINE CLINIC | Facility: CLINIC | Age: 66
End: 2021-01-27

## 2021-01-27 LAB
BACTERIA BLD CULT: NORMAL
BACTERIA BLD CULT: NORMAL

## 2021-01-27 NOTE — TELEPHONE ENCOUNTER
Received staff message from hospital provider to contact patient to establish care at our office  I called and LMOM to contact us back if he would like to establish care with us   Our phone number and address provided

## 2021-02-03 ENCOUNTER — TRANSITIONAL CARE MANAGEMENT (OUTPATIENT)
Dept: INTERNAL MEDICINE CLINIC | Facility: CLINIC | Age: 66
End: 2021-02-03

## 2021-02-05 ENCOUNTER — TELEMEDICINE (OUTPATIENT)
Dept: INTERNAL MEDICINE CLINIC | Facility: CLINIC | Age: 66
End: 2021-02-05

## 2021-02-05 VITALS — OXYGEN SATURATION: 96 % | HEART RATE: 100 BPM

## 2021-02-05 DIAGNOSIS — J12.82 PNEUMONIA DUE TO COVID-19 VIRUS: ICD-10-CM

## 2021-02-05 DIAGNOSIS — E11.9 TYPE 2 DIABETES MELLITUS WITHOUT COMPLICATION, WITHOUT LONG-TERM CURRENT USE OF INSULIN (HCC): ICD-10-CM

## 2021-02-05 DIAGNOSIS — U07.1 PNEUMONIA DUE TO COVID-19 VIRUS: ICD-10-CM

## 2021-02-05 DIAGNOSIS — I63.9 CEREBROVASCULAR ACCIDENT (CVA), UNSPECIFIED MECHANISM (HCC): ICD-10-CM

## 2021-02-05 DIAGNOSIS — U07.1 COVID-19: Primary | ICD-10-CM

## 2021-02-05 PROCEDURE — 99495 TRANSJ CARE MGMT MOD F2F 14D: CPT | Performed by: INTERNAL MEDICINE

## 2021-02-05 RX ORDER — ASPIRIN 81 MG/1
81 TABLET ORAL DAILY
Qty: 90 TABLET | Refills: 3 | Status: SHIPPED | OUTPATIENT
Start: 2021-02-05

## 2021-02-05 RX ORDER — ATORVASTATIN CALCIUM 40 MG/1
40 TABLET, FILM COATED ORAL
Qty: 90 TABLET | Refills: 1 | Status: SHIPPED | OUTPATIENT
Start: 2021-02-05

## 2021-02-05 NOTE — PATIENT INSTRUCTIONS
COVID-19 and Chronic Health Conditions   WHAT YOU NEED TO KNOW:   Your chronic condition may increase your risk for COVID-19 or serious problems it can cause, such as pneumonia  Healthcare providers might need to make changes that affect how you usually manage your chronic health condition  Providers may change hours of operation or not have patients come in to be seen  You may not be able to make appointments to get blood drawn or to have tests or procedures  This may continue until the virus that causes COVID-19 is controlled  Until then, you can take steps to manage your condition  The steps will also lower your risk for COVID-19 or the serious problems it causes  DISCHARGE INSTRUCTIONS:   If you think you may be infected with the new coronavirus,  do the following to protect others:  · If emergency care is needed,  tell the  about the possible infection, or call ahead and tell the emergency department  · Call a healthcare provider  for instructions if symptoms are mild  Do not  arrive without calling first  Your provider will need to protect staff members and other patients  · Cover your mouth and nose  while you are getting medical care  This will help lower the risk of infecting others  Call your local emergency number [phone] in the [address] [place] [address],[address]) or emergency department if:   · You have trouble breathing or shortness of breath  · You have chest pain or pressure that lasts longer than 5 minutes  · You become confused or hard to wake  · Your lips or face are blue  · You have a fever of 104°F (40°C) or higher  Call your doctor or healthcare provider if:   · You do not  have symptoms of COVID-19 but had close physical contact within 14 days with someone who tested positive  · You have questions or concerns about your COVID-19 or your chronic condition      The following may increase your risk for serious effects of COVID-19:   · Age 72 years or older    · Obesity, diabetes, cancer, or a liver disease    · Kidney failure, chronic kidney disease, or sickle cell disease    · Lung disease, COPD, moderate-to-severe asthma, cystic fibrosis, or pulmonary fibrosis (scarring in your lungs)    · A severe heart disease, such as coronary artery disease or heart failure    · A brain blood vessel disorder or disease, or a condition such as dementia    · Living in a nursing home or long-term care facility    · Smoking cigarettes    · Hypertension (high blood pressure) or thalassemia    · An immune system problem, HIV or AIDS, or a blood or bone marrow transplant    · Long-term use of certain medicines, such as corticosteroids    · Pregnancy    Manage your chronic health condition during this time:  If you do not have a regular healthcare provider, experts recommend you contact a local [facility] health center or health department  The following can help you manage your condition and prevent COVID-19:  · Get emergency care for your condition if needed  Talk to your healthcare providers about symptoms of your chronic condition that need immediate care  Your providers can help you create a plan or add exacerbation management to your plan  The plan will include when to go to an emergency department and when to call your local emergency number  This will depend on where you live and the services that are available during this time  · Go to dialysis appointments as scheduled  It is important to stay on schedule  You will need to have enough food to be able to follow the emergency diet plan if you must miss a session  The emergency diet needs to be part of the management plan for your condition  · Reschedule any upcoming appointments as needed  Medical facilities may be closed until the new coronavirus is better controlled  This means you may need to reschedule a surgery, procedure, or check-up appointment  If you cannot have a phone or video appointment, you will need to make a new appointment   Some providers may be scheduling appointments several months in advance  Some surgeries and procedures will happen as scheduled  This depends on the medical condition and the reason for the surgery or procedure  You may need to have extra testing for COVID-19 several days before  · Follow any regular management plan you use  Your healthcare provider will tell you if you need to make any changes to your regular management plan  For example, if you have asthma, continue to follow your asthma action plan  If you have diabetes, you may need to check your blood sugar level more often  Stress and illness can make blood sugar levels go up  You may need to adjust medicine such as insulin  If you have a heart condition or high blood pressure, you may need to check your blood pressure more often  Stress and illness can also raise your blood pressure  · Talk to your healthcare providers about your medicines  You may be able to get more than 1 month of medicine at a time  This will lower the number of times you need to go to a pharmacy to get your medicines  Make sure you have enough medicine if you have a condition that can lead to an emergency  Examples include asthma medicines, insulin, or an epinephrine pen  Check the expiration dates on the medicines you currently have  Ask for refills as soon as possible, if needed  If it is not time to refill prescriptions, you may be able to get an emergency supply of some medicines  Medicine plans vary, so ask your healthcare provider or pharmacist for options  · Have supplies available in your home  If possible, get extra supplies you use regularly  Examples include absorbent pads, syringes, and wound cleaning solutions  This will limit the number of trips out of your home to get supplies  · Know the signs and symptoms of COVID-19  Signs and symptoms usually start about 5 days after infection but can take 2 to 14 days  Signs and symptoms range from mild to severe   You may feel like you have the flu or a bad cold  Your chronic health condition may cause some of the same symptoms COVID-19 causes  This can make it hard to know if a symptom is from COVID-19 or your chronic condition  Keep a record of any new or worsening symptom you have  This is especially important if you have a condition that often causes shortness of breath  Your provider can tell you if you should be tested for COVID-19  Information is still being learned  Tell your healthcare provider if you think you were infected but develop signs or symptoms not listed below:    ? A cough    ? Shortness of breath or trouble breathing that may become severe    ? A fever of at least 100 4°F, or 38°C (may be lower in adults 65 or older)    ? Chills that might include shaking    ? Muscle pain, body aches, or a headache    ? A sore throat    ? Suddenly not being able to taste or smell anything    ? Feeling very tired (fatigue)    ? Congestion (stuffy head and nose), or a runny nose    ? Diarrhea, nausea, or vomiting    What you can do to prevent having to go out of your home during this time:   · Ask your healthcare provider for other ways to have appointments  You may be able to have appointments without having to go into the provider's office  Some providers offer phone, video, or other types of appointments  · Have food, medicines, and other supplies delivered  Some pharmacies can send certain medicines to you through the mail  Grocery stores and restaurants may be able to deliver food and other items  If possible, have delivered items placed somewhere  Try not to have someone hand you an item  You will be so close to the person that the virus can spread between you  Wash your hands after you put the delivered items away  · Ask someone to get items you need  The person can get groceries, medicines, or other needed items for you  Choose a person who does not have signs or symptoms of COVID-19 or has tested negative for it   The person should not be waiting for test results  He or she should not have a condition that increases the risk for COVID-19 or serious problems it causes  Lower your risk for COVID-19:  The virus that causes COVID-19 spreads quickly and easily  It mainly spreads through droplets that form when a person talks, coughs, or sneezes  An infected person may also be able to leave the virus on objects and surfaces  Another person can get the virus on his or her hands by touching the object or surface  Infection happens if the person then touches his or her eyes or mouth with unwashed hands  The best way to prevent infection is to avoid anyone who is infected, but this can be hard to do  An infected person can spread the virus before signs or symptoms begin, or even if signs or symptoms never develop  The following are ways to prevent the spread of the virus and lower your risk for COVID-19:      · Wash your hands often throughout the day  Use soap and water  Rub your soapy hands together, lacing your fingers  Wash the front and back of each hand, and in between your fingers  Use the fingers of one hand to scrub under the fingernails of the other hand  Wash for at least 20 seconds  Rinse with warm, running water for several seconds  Then dry your hands with a clean towel or paper towel  Use hand  that contains alcohol if soap and water are not available  Do not touch your eyes, nose, or mouth without washing your hands first  Teach children how to wash their hands  · Cover sneezes and coughs  Turn your face away and cover your mouth and nose with a tissue  Throw the tissue away  Use the bend of your arm if a tissue is not available  Then wash your hands well with soap and water or use hand   Turn your head and cover your face if someone near you is coughing or sneezing  Teach children how to cover a cough or sneeze  Remind them to wash their hands  · Make a habit of not touching your face    If you get the virus on your hands, you can transfer it to your eyes, nose, or mouth and become infected  · Clean and disinfect high-touch surfaces and objects in your home often  Do this regularly, even if you think no one living in or coming to your home is infected with the virus  Surfaces include countertops, cupboard doors, desks, handles, handrails, doorknobs, toilet handles, faucets, chairs, and light switches  Objects include keys, phones, computer keyboards and mice, video games, remote controls, and children's toys  Some surfaces and objects may need to be cleaned several times each day, depending on how often they are used  ? Use disinfecting wipes or a disinfecting solution  You can make a solution by mixing 4 teaspoons of bleach with 1 quart (4 cups) of water  Clean with a sponge or cloth that can be thrown away or washed in hot, soapy water and reused  Clean used dishes and utensils in hot, soapy water or in the   ? Be careful with   Do not use any cleaning or disinfecting products that can trigger an asthma attack or other breathing problems  Open windows or have circulating air as you clean  Do not  mix ammonia with bleach  This will create toxic fumes  Read the labels of all products you use  · Ask about vaccines you may need  No vaccine is available yet for the new coronavirus  It is still a good idea to get other vaccines to help protect your immune system  Get the influenza (flu) vaccine as soon as recommended each year, usually starting in September or October  A flu infection can make COVID-19 worse if you have them at the same time  The flu can also cause signs and symptoms that are similar to COVID-19  Get the pneumonia vaccine if recommended  Your healthcare provider can tell you if you also need other vaccines, and when to get them    Follow social distancing guidelines if you must go out of your home during this time:  Social distancing means people stay far enough apart physically that the virus cannot spread from one person to another  National and local social distancing rules vary  Rules may change over time as restrictions are lifted, but they may return if an outbreak happens where you live  It is important to know and follow all current social distancing rules in your area  The following are general guidelines:  · Limit trips out of your home  Most local guidelines allow leaving the home to buy food or supplies, or to seek medical care if necessary  · Stay at least 6 feet (2 meters) away from anyone who does not live in your home  Do not shake hands with, hug, or kiss a person as a greeting  Stand or walk as far from others as possible, especially around anyone who is sneezing or coughing  If you must use public transportation (such as a bus or subway), try to sit or stand away from others  You can stay safely connected with others through phone calls, e-mail messages, social media websites, and video chats  Check in on anyone who may be having a hard time socially distancing, or who lives alone  Ask administrators at nursing homes or long-term care facilities how you can safely communicate with someone living there  · Wear a cloth face covering (mask) when you must go out  Only do this if your chronic condition does not cause breathing problems  You can make a face covering out of a thick cloth  This will save medical masks for healthcare workers and first responders  Choose fabric that holds its shape after it is washed and dried, such as cotton  Put the top half of a paper coffee filter in the middle of the fabric to create an extra filter for you  Check that you can breathe through the fabric when it is folded into several layers  Fold the fabric into a long band  Put each end through a rubber band or hair tie to create ear loops  Fold the ends of the fabric toward the middle  Hold the covering to your face   Adjust it so it covers your nose and mouth completely  Hook the loops onto your ears to keep the covering in place  The following are important points to remember:     ? Do not use a plastic face shield instead of a cloth covering  A face shield will not protect others from droplets  If a face shield must be used, choose one that wraps around both sides of the face and goes below the chin  Do not use disposable shields more than 1 time  Raphael Jason that can be used again need to be cleaned and disinfected between uses  Do not put a face shield or a cloth covering on a  or infant  These increase the risk for sudden infant death syndrome (SIDS)  ? Continue social distancing while you are out  A face covering does not mean you can have close physical contact with others  You still need to stay at least 6 feet (2 meters) away from others  ? Do not touch your face covering or eyes while you are wearing the covering  Your eyes will not be covered by the cloth  You can be infected if the virus is on your hand and you touch your eyes  Wash your hands with soap and water for 20 seconds or use hand  before you remove your face covering  ? Do not remove your face covering to talk, sneeze, or cough  Your face covering will help protect you and others around you  ? Wash face coverings often  Wash them in a washing machine in the warmest water the fabric allows  Make sure the fabric is completely dry before you use the face covering again  Only wear clean coverings when you go out  Use a new coffee filter each time  ? Certain individuals should not use face coverings  Do not put a face covering on anyone who is younger than 2 years  [facility] children may not be able to breathe through the covering  Do not put a face covering on anyone who cannot remove it by himself or herself  ? You can use a clear face covering if others need to read your lips    A clear covering may be helpful if you communicate with someone who is deaf or hard of hearing  A clear covering is made of cloth but has plastic over the mouth area  This will help the person see your lips more easily  Do not use a plastic face shield instead  ? Do not use face coverings that have breathing valves or vents  Valves or vents on the sides are designed to allow breathed air to go out  This makes breathing easier, but the virus can travel out of the valve or vent and be spread to others  · Do not have anyone over to your home unless it is necessary  It is okay to have medical workers in to provide care  Wear your face covering, and remind medical workers to wear a mask or face covering  Remind them to wash their hands when they arrive and before they leave  Do not  have family members, friends, or neighbors over, even if they are not sick  A person can pass the new virus to others before symptoms of COVID-19 begin  Some people never even develop symptoms  Children commonly have mild symptoms or no symptoms  It is important that you continue social distancing with everyone, including children  It may be hard to tell a child not to hug or kiss you  Explain that this is how he or she can help you stay healthy  · Do not go to someone else's home unless it is necessary  Do not go over to visit, even if the person is lonely  Only go if you need to help him or her  · Avoid large gatherings and crowds  Gatherings or crowds of 10 or more individuals can cause the virus to spread  Examples of gatherings include parties, sporting events, [employer] services, and conferences  Crowds may form at beaches, [place], and tourist attractions  You can continue to protect yourself by staying away from large gatherings and crowds  · Stay safe if you must go out to work  You may have a job only done outside your home that is considered essential  Keep physical distance between you and other workers as much as possible  Follow your employer's rules so everyone stays safe      Help strengthen your immune system:   · Do not smoke  Nicotine and other chemicals in cigarettes and cigars can cause lung damage and increase your risk for infections such as bronchitis or pneumonia  Ask your healthcare provider for information if you currently smoke and need help to quit  E-cigarettes or smokeless tobacco still contain nicotine  Talk to your healthcare provider before you use these products  · Eat a variety of healthy foods  Examples include vegetables, fruits, whole-grain breads and cereals, lean meats and poultry, fish, low-fat dairy products, and cooked beans  Healthy foods contain nutrients that help keep your immune system strong  · Find ways to manage stress  You may be feeling more stressed than usual because of the COVID-19 outbreak  The situation is very stressful to many people  Talk to your healthcare providers about ways to manage stress during this time  Stress can lead to breathing problems or make the problems worse  Stress can trigger an attack or exacerbation of many health conditions  It is important to do things that help you feel more relaxed, such as the following:     ? Pick 1 or 2 times a day to watch the news  Constant news watching can increase your stress levels  ? Talk to a friend on the phone or through a video chat  ? Take a warm, soothing bath  ? Listen to music  ? Exercise can also help relieve stress  This may be hard if your regular gym or outdoor exercise area is closed  If you do not have exercise equipment at home, try walking inside your home  You can walk quickly or turn on music and dance  Follow up with your doctor or healthcare provider as directed: Your providers will tell you when you can come in for tests, procedures, or check-ups  Bring your symptom record with you to all appointments  Write down your questions so you remember to ask them during your visits    For more information:   · Centers for Disease Control and Prevention  [address]   [place] , [address]  Phone: 5- 385 - 1601409  Phone: 6- 003 - 8184250  Web Address: [employer] com br    © Copyright [address] Information is for End User's use only and may not be sold, redistributed or otherwise used for commercial purposes  All illustrations and images included in CareNotes® are the copyrighted property of [employer]  or [facility] Tori Sanders   The above information is an  only  It is not intended as medical advice for individual conditions or treatments  Talk to your doctor, nurse or pharmacist before following any medical regimen to see if it is safe and effective for you

## 2021-02-05 NOTE — PROGRESS NOTES
Assessment/Plan:      Problem List Items Addressed This Visit        Endocrine    Type 2 diabetes mellitus ([facility] [address] )    Relevant Orders    Lipid Panel with Direct LDL reflex       Respiratory    Pneumonia due to COVID-19 virus    Relevant Medications    atorvastatin (LIPITOR) 40 mg tablet      Other Visit Diagnoses     COVID-19    -  Primary    Cerebrovascular accident (CVA), unspecified mechanism ([facility] [address] )        Relevant Medications    aspirin (ECOTRIN LOW STRENGTH) 81 mg EC tablet           COVID-19  Continue oxygen PRN  Finished steroids/remdesivir  Was discharged with atorvastatin and famotidine  Discontinue famotidine  patient has a pulse oximeter and was advised to try it with walking for 5-10 minutes and use oxygen if his saturation drops below 90 - patient expressed understanding      Diabetes, new diagnosis   patient has been using metformin 500 b i d ,   advised to escalate to a 1000 b i d    obtain baseline lipid panel   continue atorvastatin, refill sent   glucometer had been sent to [facility] per hospital discharge summary  Patient has not yet  picked it up and has been advised to pick it up  Hx of stroke  Continue atorvastatin, start 81mg aspirin daily      Pt was recommended followup in 1 month for a New Patient in-office visit with new resident for full physical    Reason for visit is TCM followup hospitalization for COVID19  Encounter provider Rosaleen Fabry, DO     Provider located at [facility]  [address],[place] [address] [id number] [phone]      Recent Visits  No visits were found meeting these conditions  Showing recent visits within past 7 days and meeting all other requirements     Today's Visits  Date Type Provider Dept   02/05/21 Telemedicine [facility] Jevon Duran [facility] today's visits and meeting all other requirements     Future Appointments  No visits were found meeting these conditions     Showing future appointments within next 150 days and meeting all other requirements        After connecting through [employer], the patient was identified by name and date of birth  Erma Ruiz was informed that this is a telemedicine visit and that the visit is being conducted through [employer] and patient was informed that this is a secure, HIPAA-compliant platform  He agrees to proceed     My office door was closed  The patient was notified the following individuals were present in the room Dr Nate Blair  He acknowledged consent and understanding of privacy and security of the video platform  The patient has agreed to participate and understands they can discontinue the visit at any time  Patient is aware this is a billable service  Subjective:     Patient ID: Erma Ruiz is a 76 y o  male with newly diagnosed DM, PMHx of Stroke who is presenting as a new patient for transition of care management visit  Patient was recently hospitalized from 1/22-1/26 for COVID-19 and was treated with [id number], steroids, and discharged on 4 liters of oxygen with ambulation  He reports overall feeling better since leaving the hospital, but breathing is not back to normal     he reports he is barely using oxygen with walking  Pt reports slight cough, no fevers or chills,  No chest pain or palpitations  Lives with wife and daughter, all have [facility]  Reports PMHx of stroke 10 years ago, left sided symptoms, does not feel back to normal but reports equal strength bilaterally  Denies any other past medical history medications reviewed at this visit  Prior to hospitalization, patient was not taking any medications  Pt was diagnosed with DM during hospitalization and started on metformin, diabetes supplies were sent to pharmacy  HPI    Review of Systems   Constitutional: Negative for chills, fatigue and fever  HENT: Negative for rhinorrhea and sore throat  Respiratory: Positive for cough and shortness of breath   Negative for choking, chest tightness, wheezing and stridor  Cardiovascular: Negative for chest pain, palpitations and leg swelling  Gastrointestinal: Negative for abdominal pain, blood in stool, constipation, diarrhea, nausea and vomiting  Genitourinary: Negative for hematuria  Neurological: Negative for dizziness and light-headedness  Objective:    Vitals:    02/05/21 0850   Pulse: 100   SpO2: 96%       Physical Exam  Constitutional:       Appearance: He is well-developed  HENT:      Head: Normocephalic and atraumatic  Nose:      Comments: Not on oxygen  Eyes:      General:         Right eye: No discharge  Left eye: No discharge  Neck:      Musculoskeletal: Normal range of motion  Pulmonary:      Effort: Pulmonary effort is normal  No respiratory distress  Musculoskeletal: Normal range of motion  Skin:     General: Skin is dry  Neurological:      Mental Status: He is alert and oriented to person, place, and time  Psychiatric:         Behavior: Behavior normal            Transitional Care Management Review:  Carline Marie is a 76 y o  male here for TCM follow up  During the TCM phone call patient stated:    TCM Call (since 1/5/2021)     Date and time call was made  2/3/2021  2:02 PM    Hospital care reviewed  Records reviewed    Disposition  Home (Comment)  [address]    Were the patients medications reviewed and updated  Yes    Current Symptoms  None      TCM Call (since 1/5/2021)     Post hospital issues  Reduced activity (Comment)  WAS ON OXYGEN BUT STOPPED USING IT BECAUSE HE DOESNT FEEL LIKE HE NEEDS IT    Should patient be enrolled in anticoag monitoring? No    Scheduled for follow up? Yes (Comment)  2/5/2021 WITH DR Nemo Johnson    Did you obtain your prescribed medications  Yes (Comment)  USES [facility] [address]  IN [place]    Do you need help managing your prescriptions or medications  No (Comment)  PATIENT AND WIFE MANAGE MEDS    Is transportation to your appointment needed  No (Comment)  PT  DRIVES    I have advised the patient to call PCP with any new or worsening symptoms  Regina Matson LPN    Living Arrangements  Spouse or Significiant other    Are you recieving any outpatient services  No    Are you recieving home care services  No    Are you using any community resources  No    Have you fallen in the last 12 months  No    Interperter language line needed  No (Comment)  SPOKE TO WIFE SIERRA IN ENGLISH    Counseling  Family    Counseling topics  instructions for management; Importance of RX compliance          I spent 30 minutes with the patient during this visit      Connie Pearl DO

## 2021-02-12 ENCOUNTER — LAB (OUTPATIENT)
Dept: LAB | Facility: HOSPITAL | Age: 66
End: 2021-02-12
Payer: MEDICARE

## 2021-02-12 DIAGNOSIS — E11.9 TYPE 2 DIABETES MELLITUS WITHOUT COMPLICATION, WITHOUT LONG-TERM CURRENT USE OF INSULIN (HCC): ICD-10-CM

## 2021-02-12 LAB
CHOLEST SERPL-MCNC: 202 MG/DL
HDLC SERPL-MCNC: 35 MG/DL
LDLC SERPL CALC-MCNC: 110 MG/DL (ref 0–100)
TRIGL SERPL-MCNC: 287 MG/DL

## 2021-02-12 PROCEDURE — 36415 COLL VENOUS BLD VENIPUNCTURE: CPT

## 2021-02-12 PROCEDURE — 80061 LIPID PANEL: CPT

## 2021-02-17 DIAGNOSIS — E11.9 TYPE 2 DIABETES MELLITUS (HCC): ICD-10-CM

## 2021-03-08 ENCOUNTER — TELEPHONE (OUTPATIENT)
Dept: INTERNAL MEDICINE CLINIC | Facility: CLINIC | Age: 66
End: 2021-03-08

## 2021-03-18 ENCOUNTER — PATIENT OUTREACH (OUTPATIENT)
Dept: INTERNAL MEDICINE CLINIC | Facility: CLINIC | Age: 66
End: 2021-03-18

## 2021-03-18 NOTE — PROGRESS NOTES
JASSI received message from [place] that pt is having difficulty getting his Home Oxygen  Jassi has spoke with Rizwana Sandoval of [employer] -[phone] [phone]  She relates they will need a Discharge order  JASSI has spoke with Clinical Team re same who will f/u with pt  and Provider re same

## 2021-03-25 ENCOUNTER — OFFICE VISIT (OUTPATIENT)
Dept: INTERNAL MEDICINE CLINIC | Facility: CLINIC | Age: 66
End: 2021-03-25

## 2021-03-25 VITALS
DIASTOLIC BLOOD PRESSURE: 79 MMHG | WEIGHT: 202.2 LBS | HEART RATE: 102 BPM | BODY MASS INDEX: 33.65 KG/M2 | SYSTOLIC BLOOD PRESSURE: 148 MMHG | TEMPERATURE: 96.8 F

## 2021-03-25 DIAGNOSIS — E11.9 TYPE 2 DIABETES MELLITUS WITHOUT COMPLICATION, WITHOUT LONG-TERM CURRENT USE OF INSULIN (HCC): ICD-10-CM

## 2021-03-25 DIAGNOSIS — U07.1 PNEUMONIA DUE TO COVID-19 VIRUS: Primary | ICD-10-CM

## 2021-03-25 DIAGNOSIS — E11.65 TYPE 2 DIABETES MELLITUS WITH HYPERGLYCEMIA, UNSPECIFIED WHETHER LONG TERM INSULIN USE (HCC): ICD-10-CM

## 2021-03-25 DIAGNOSIS — J12.82 PNEUMONIA DUE TO COVID-19 VIRUS: Primary | ICD-10-CM

## 2021-03-25 DIAGNOSIS — I63.9 CEREBROVASCULAR ACCIDENT (CVA), UNSPECIFIED MECHANISM (HCC): ICD-10-CM

## 2021-03-25 DIAGNOSIS — Z12.11 COLON CANCER SCREENING: ICD-10-CM

## 2021-03-25 PROCEDURE — 99215 OFFICE O/P EST HI 40 MIN: CPT | Performed by: INTERNAL MEDICINE

## 2021-03-25 RX ORDER — LANCETS 33 GAUGE
EACH MISCELLANEOUS DAILY
Qty: 30 EACH | Refills: 2 | Status: SHIPPED | OUTPATIENT
Start: 2021-03-25 | End: 2021-03-29

## 2021-03-25 RX ORDER — BLOOD SUGAR DIAGNOSTIC
STRIP MISCELLANEOUS
Qty: 50 EACH | Refills: 2 | Status: SHIPPED | OUTPATIENT
Start: 2021-03-25 | End: 2021-03-26 | Stop reason: SDUPTHER

## 2021-03-25 RX ORDER — BLOOD-GLUCOSE METER
1 KIT MISCELLANEOUS DAILY
Qty: 1 KIT | Refills: 0 | Status: SHIPPED | OUTPATIENT
Start: 2021-03-25 | End: 2021-03-29 | Stop reason: ALTCHOICE

## 2021-03-25 RX ORDER — BLOOD SUGAR DIAGNOSTIC
STRIP MISCELLANEOUS
Qty: 30 EACH | Refills: 2 | Status: SHIPPED | OUTPATIENT
Start: 2021-03-25 | End: 2021-03-25

## 2021-03-25 RX ORDER — BLOOD PRESSURE TEST KIT
KIT MISCELLANEOUS 2 TIMES DAILY
Qty: 1 EACH | Refills: 0 | Status: SHIPPED | OUTPATIENT
Start: 2021-03-25

## 2021-03-25 NOTE — PROGRESS NOTES
INTERNAL MEDICINE FOLLOW-UP OFFICE VISIT  [facility]  [address] [place] [address]    NAME: Arsen Augustin  AGE: 76 y o  SEX: male    DATE OF ENCOUNTER: 3/25/2021    Assessment and Plan     1  Pneumonia due to COVID-19 virus    -does not need oxygen any more  Denies any symptoms- no cough, fevers, SOB, chest pain    -Monitor  -Call if develops any SOB / Chest pain      2  Type 2 diabetes mellitus without complication, without long-term current use of insulin (HCC)    -A1C 8 6 1/21  -Started on Metformin- endorses diarrhea and abdominal discomfort- not increasing the dose at this time    -Will add Invoka to the regimen  - Microalbumin / creatinine urine ratio  - Ambulatory referral to Ophthalmology; Future    3  Cerebrovascular accident (CVA), unspecified mechanism ([facility] [address]     -remote hx 10 years ago  4  Type 2 diabetes mellitus with hyperglycemia, unspecified whether long term insulin use (HCC)      - Blood Glucose Monitoring Suppl (OneTouch Verio Reflect) w/Device KIT; Use 1 kit daily E11 65, Please substitute with appropriate alternative as covered by patient's insurance  Dispense: 1 kit; Refill: 0  - glucose blood (OneTouch Verio) test strip; E11 65, Please substitute with appropriate alternative as covered by patient's insurance  Dispense: 30 each; Refill: 2  - OneTouch Delica Lancets 31B MISC; Use daily E11 65, Please substitute with appropriate alternative as covered by patient's insurance  Dispense: 30 each; Refill: 2  - canagliflozin (Invokana) 100 mg; Take 1 tablet (100 mg total) by mouth daily before breakfast  Dispense: 30 tablet; Refill: 2    5- Colorectal SCreening    Chief Complaint     Chief Complaint   Patient presents with    Follow-up       History of Present Illness     HPI       Pt is a 77 y/o M w/ PMHx of Type 2 DM (A1C 8 6 1/21), HLD, CVA (10 yrs ago w/ L sided weakness), COVID Pneumonia 1/21 d/c on 4 L NC who presents for a COVID follow up  He used the oxygen only once after the d/c  He denies any respiratory distress, cough, myalgia, diarrhea or constipation, loss of smell or taste, fevers, chest pain, palpitations  He does not smoke or drink alcohol  His appetite is fine  He would like to get a referral for endocrinologist and ophthalmologist  He saw an ophtha 3-4 years ago  His vision is stable at this time  He lives with his wife and daughter and unemployed  Recommended to check bp and blood sugars at home and bring the logs to next visit  The following portions of the patient's history were reviewed and updated as appropriate: allergies, current medications, past family history, past medical history, past social history, past surgical history and problem list       Right Foot/Ankle   Right Foot Inspection  Skin Exam: skin normal skin not intact, no dry skin, no warmth, no callus, no erythema, no maceration, no abnormal color, no pre-ulcer, no ulcer and no callus                            Sensory   Vibration: intact  Proprioception: intact   Monofilament testing: intact      Left Foot/Ankle  Left Foot Inspection  Skin Exam: skin normalskin not intact, no dry skin, no warmth, no erythema, no maceration, normal color, no pre-ulcer, no ulcer and no callus                                         Sensory   Vibration: intact  Proprioception: intact  Monofilament: intact          Review of Systems     Review of Systems   HENT: Negative for congestion and voice change  Eyes: Negative for visual disturbance  Respiratory: Negative for cough, chest tightness, shortness of breath and wheezing  Cardiovascular: Negative for chest pain, palpitations and leg swelling  Gastrointestinal: Negative for abdominal distention, abdominal pain, constipation, nausea and vomiting  Endocrine: Negative for polydipsia and polyuria  Genitourinary: Negative for decreased urine volume, difficulty urinating, dysuria, flank pain, frequency and urgency  Neurological: Negative for dizziness, facial asymmetry, weakness and headaches  Psychiatric/Behavioral: Negative for agitation and behavioral problems  Active Problem List     Patient Active Problem List   Diagnosis    Pneumonia due to COVID-19 virus    Sepsis ([address] )    Hyponatremia    Type 2 diabetes mellitus (HCC)       Objective     /79 (BP Location: Left arm, Patient Position: Sitting, Cuff Size: Standard)   Pulse 102   Temp (!) 96 8 °F (36 °C) (Temporal)   Wt 91 7 kg (202 lb 3 2 oz)   BMI 33 65 kg/m²     Physical Exam  Constitutional:       General: He is not in acute distress  Appearance: He is not ill-appearing, toxic-appearing or diaphoretic  HENT:      Head: Normocephalic and atraumatic  Nose: No congestion or rhinorrhea  Cardiovascular:      Rate and Rhythm: Regular rhythm  Tachycardia present  Pulses: Normal pulses  Heart sounds: Normal heart sounds  No murmur  No friction rub  No gallop  Pulmonary:      Effort: Pulmonary effort is normal  No respiratory distress  Breath sounds: No stridor  No wheezing, rhonchi or rales  Chest:      Chest wall: No tenderness  Abdominal:      General: Abdomen is flat  Bowel sounds are normal  There is no distension  Palpations: Abdomen is soft  Tenderness: There is no abdominal tenderness  There is no right CVA tenderness, left CVA tenderness, guarding or rebound  Hernia: No hernia is present  Musculoskeletal:      Right lower leg: No edema  Left lower leg: No edema  Feet:      Right foot:      Skin integrity: No ulcer, skin breakdown, erythema, warmth, callus or dry skin  Left foot:      Skin integrity: No ulcer, skin breakdown, erythema, warmth, callus or dry skin  Neurological:      General: No focal deficit present  Mental Status: He is oriented to person, place, and time     Psychiatric:         Mood and Affect: Mood normal          Behavior: Behavior normal          Pertinent Laboratory/Diagnostic Studies:  CBC:   Lab Results   Component Value Date/Time    WBC 10 50 (H) 01/26/2021 06:46 AM    RBC 4 36 01/26/2021 06:46 AM    HGB 13 3 01/26/2021 06:46 AM    HCT 39 0 01/26/2021 06:46 AM    MCV 89 01/26/2021 06:46 AM    MCH 30 5 01/26/2021 06:46 AM    MCHC 34 1 01/26/2021 06:46 AM    RDW 14 0 01/26/2021 06:46 AM    MPV 10 0 01/26/2021 06:46 AM     (H) 01/26/2021 06:46 AM    NRBC 0 01/26/2021 06:46 AM    NEUTOPHILPCT 89 (H) 01/25/2021 04:53 AM    LYMPHOPCT 0 (L) 01/26/2021 06:46 AM    LYMPHOPCT 5 (L) 01/25/2021 04:53 AM    MONOPCT 5 01/26/2021 06:46 AM    MONOPCT 4 01/25/2021 04:53 AM    EOSPCT 0 01/26/2021 06:46 AM    EOSPCT 0 01/25/2021 04:53 AM    BASOPCT 0 01/26/2021 06:46 AM    BASOPCT 0 01/25/2021 04:53 AM    NEUTROABS 10 82 (H) 01/25/2021 04:53 AM    LYMPHSABS 0 62 01/25/2021 04:53 AM    MONOSABS 0 52 01/25/2021 04:53 AM    EOSABS 0 00 01/26/2021 06:46 AM    EOSABS 0 00 01/25/2021 04:53 AM     Chemistry Profile:   Lab Results   Component Value Date/Time    K 4 2 01/26/2021 06:46 AM    CL 98 (L) 01/26/2021 06:46 AM    CO2 25 01/26/2021 06:46 AM    BUN 15 01/26/2021 06:46 AM    CREATININE 0 74 01/26/2021 06:46 AM    GLUC 248 (H) 01/26/2021 06:46 AM    CALCIUM 8 7 01/26/2021 06:46 AM    CORRECTEDCA 10 1 01/25/2021 04:53 AM    AST 42 01/25/2021 04:53 AM    ALT 85 (H) 01/25/2021 04:53 AM    ALKPHOS 113 01/25/2021 04:53 AM    EGFR 96 01/26/2021 06:46 AM     Coagulation Studies:   Lab Results   Component Value Date/Time    PROTIME 13 7 01/22/2021 02:39 PM    INR 1 04 01/22/2021 02:39 PM    PTT 50 (H) 01/22/2021 02:39 PM     Cardiac Studies:   Lab Results   Component Value Date/Time    NTBNP 69 01/22/2021 02:41 PM    TROPONINI <0 02 01/22/2021 02:41 PM     CBC:   Results from Last 12 Months   Lab Units 01/26/21  0646 01/25/21  0453   WBC Thousand/uL 10 50* 12 17*   RBC Million/uL 4 36 4 23   HEMOGLOBIN g/dL 13 3 12 8   HEMATOCRIT % 39 0 38 9   MCV fL 89 92   MCH pg 30 5 30 3   MCHC g/dL 34 1 32 9   RDW % 14 0 14 3   MPV fL 10 0 10 8   PLATELETS Thousands/uL 480* 447*   NRBC AUTO /100 WBCs 0 0   NEUTROS PCT %  --  89*   LYMPHS PCT %  --  5*   LYMPHO PCT % 0*  --    MONOS PCT %  --  4   MONO PCT % 5  --    EOS PCT % 0 0   BASOS PCT % 0 0   NEUTROS ABS Thousands/µL  --  10 82*   LYMPHS ABS Thousands/µL  --  0 62   MONOS ABS Thousand/µL  --  0 52   EOS ABS Thousand/uL 0 00 0 00     Chemistry Profile:   Results from Last 12 Months   Lab Units 01/26/21  0646 01/25/21  0453   POTASSIUM mmol/L 4 2 4 3   CHLORIDE mmol/L 98* 101   CO2 mmol/L 25 24   BUN mg/dL 15 18   CREATININE mg/dL 0 74 0 75   GLUCOSE RANDOM mg/dL 248* 240*   CALCIUM mg/dL 8 7 8 7   CORRECTED CALCIUM mg/dL  --  10 1   AST U/L  --  42   ALT U/L  --  85*   ALK PHOS U/L  --  113   EGFR ml/min/1 73sq m 96 96     Coagulation Studies:   Results from Last 12 Months   Lab Units 01/22/21  1439   PROTIME seconds 13 7   INR  1 04   PTT seconds 50*     Cardiac Studies:   Results from Last 12 Months   Lab Units 01/22/21  1441   NT-PRO BNP pg/mL 69   TROPONIN I ng/mL <0 02       Current Medications     Current Outpatient Medications:     aspirin (ECOTRIN LOW STRENGTH) 81 mg EC tablet, Take 1 tablet (81 mg total) by mouth daily, Disp: 90 tablet, Rfl: 3    atorvastatin (LIPITOR) 40 mg tablet, Take 1 tablet (40 mg total) by mouth daily at bedtime, Disp: 90 tablet, Rfl: 1    metFORMIN (GLUCOPHAGE) 500 mg tablet, Take 2 tablets (1,000 mg total) by mouth 2 (two) times a day with meals, Disp: 120 tablet, Rfl: 0    Blood Glucose Monitoring Suppl (OneTouch Verio Reflect) w/Device KIT, Use 1 kit daily E11 65, Please substitute with appropriate alternative as covered by patient's insurance , Disp: 1 kit, Rfl: 0    Blood Pressure KIT, Use 2 (two) times a day, Disp: 1 each, Rfl: 0    canagliflozin (Invokana) 100 mg, Take 1 tablet (100 mg total) by mouth daily before breakfast, Disp: 30 tablet, Rfl: 2    glucose blood (OneTouch Verio) test strip, E11 65, Please substitute with appropriate alternative as covered by patient's insurance , Disp: 30 each, Rfl: 2    OneTouch Delica Lancets 19R MISC, Use daily E11 65, Please substitute with appropriate alternative as covered by patient's insurance , Disp: 30 each, Rfl: 2    Health Maintenance     Health Maintenance   Topic Date Due    Medicare Annual Wellness Visit (AWV)  Never done    Diabetic Foot Exam  Never done    DM Eye Exam  Never done    URINE MICROALBUMIN  Never done    Depression Screening PHQ  Never done    COVID-19 Vaccine (1) Never done    BMI: Followup Plan  Never done    DTaP,Tdap,and Td Vaccines (1 - Tdap) Never done    Colorectal Cancer Screening  Never done    Fall Risk  Never done    Pneumococcal Vaccine: 65+ Years (1 of 1 - PPSV23) Never done    Influenza Vaccine (1) Never done    HEMOGLOBIN A1C  07/22/2021    BMI: Adult  03/25/2022    Hepatitis C Screening  Completed    HIB Vaccine  Aged Out    Hepatitis B Vaccine  Aged Out    IPV Vaccine  Aged Out    Hepatitis A Vaccine  Aged Out    Meningococcal ACWY Vaccine  Aged Out    HPV Vaccine  Aged Out       There is no immunization history on file for this patient      Ramon Brown MD  PGY 1

## 2021-03-26 DIAGNOSIS — E11.65 TYPE 2 DIABETES MELLITUS WITH HYPERGLYCEMIA, UNSPECIFIED WHETHER LONG TERM INSULIN USE (HCC): ICD-10-CM

## 2021-03-26 RX ORDER — BLOOD SUGAR DIAGNOSTIC
STRIP MISCELLANEOUS
Qty: 50 EACH | Refills: 2 | Status: SHIPPED | OUTPATIENT
Start: 2021-03-26 | End: 2021-03-29 | Stop reason: ALTCHOICE

## 2021-03-29 DIAGNOSIS — E11.65 TYPE 2 DIABETES MELLITUS WITH HYPERGLYCEMIA, UNSPECIFIED WHETHER LONG TERM INSULIN USE (HCC): ICD-10-CM

## 2021-03-29 RX ORDER — BLOOD-GLUCOSE METER
1 KIT MISCELLANEOUS 2 TIMES DAILY
Qty: 1 KIT | Refills: 0 | Status: SHIPPED | OUTPATIENT
Start: 2021-03-29

## 2021-03-29 RX ORDER — LANCETS 33 GAUGE
EACH MISCELLANEOUS 2 TIMES DAILY
Qty: 100 EACH | Refills: 2 | Status: SHIPPED | OUTPATIENT
Start: 2021-03-29 | End: 2021-03-29 | Stop reason: SDUPTHER

## 2021-03-29 RX ORDER — BLOOD SUGAR DIAGNOSTIC
STRIP MISCELLANEOUS
Qty: 100 EACH | Refills: 2 | Status: SHIPPED | OUTPATIENT
Start: 2021-03-29 | End: 2021-03-29

## 2021-03-29 RX ORDER — BLOOD SUGAR DIAGNOSTIC
STRIP MISCELLANEOUS
Qty: 100 EACH | Refills: 2 | Status: SHIPPED | OUTPATIENT
Start: 2021-03-29

## 2021-03-29 RX ORDER — LANCETS 33 GAUGE
EACH MISCELLANEOUS
Qty: 100 EACH | Refills: 2 | OUTPATIENT
Start: 2021-03-29

## 2021-03-29 RX ORDER — LANCETS 33 GAUGE
EACH MISCELLANEOUS DAILY
Qty: 100 EACH | Refills: 2 | Status: SHIPPED | OUTPATIENT
Start: 2021-03-29 | End: 2021-04-28

## 2021-03-29 RX ORDER — BLOOD SUGAR DIAGNOSTIC
STRIP MISCELLANEOUS
Refills: 2 | OUTPATIENT
Start: 2021-03-29

## 2021-03-31 ENCOUNTER — TELEPHONE (OUTPATIENT)
Dept: FAMILY MEDICINE CLINIC | Facility: CLINIC | Age: 66
End: 2021-03-31

## 2021-03-31 NOTE — TELEPHONE ENCOUNTER
DSMES outreach  LM w/ patient to call back to schedule an appointment for Diabetes education  Shellie Garrett, AMARILISN, LDN, ADAN    Called patient 5/12/21 and left another message for patient to call back to schedule an appointment for DM education  DDS    Called patient 5/20/21 and spoke to him  Transportation concerns, so set up Virtual Telephone visit for 5/24/21    DDS

## 2021-04-09 ENCOUNTER — OFFICE VISIT (OUTPATIENT)
Dept: INTERNAL MEDICINE CLINIC | Facility: CLINIC | Age: 66
End: 2021-04-09

## 2021-04-09 VITALS
SYSTOLIC BLOOD PRESSURE: 168 MMHG | TEMPERATURE: 98.2 F | DIASTOLIC BLOOD PRESSURE: 85 MMHG | HEART RATE: 105 BPM | WEIGHT: 202.6 LBS | OXYGEN SATURATION: 98 % | BODY MASS INDEX: 33.76 KG/M2 | HEIGHT: 65 IN

## 2021-04-09 DIAGNOSIS — N48.1 BALANITIS: Primary | ICD-10-CM

## 2021-04-09 PROCEDURE — 99213 OFFICE O/P EST LOW 20 MIN: CPT | Performed by: PHYSICIAN ASSISTANT

## 2021-04-09 RX ORDER — FLUCONAZOLE 150 MG/1
TABLET ORAL
Qty: 3 TABLET | Refills: 0 | Status: SHIPPED | OUTPATIENT
Start: 2021-04-09 | End: 2021-04-13

## 2021-04-09 NOTE — PATIENT INSTRUCTIONS
Continue using the Not[place] cream from your [place], applied to the head of the penis twice a day for 7-10 days  Please start fluconazole pill, taking 1 pill every other day for 3 doses  If symptoms do not resolve after 10-14 days, please call the office  We may need to try treating  With an antibiotic

## 2021-04-09 NOTE — PROGRESS NOTES
Assessment/Plan:      Diagnoses and all orders for this visit:    Balanitis  -     fluconazole (DIFLUCAN) 150 mg tablet; Take 1 pill  PO every other day for 3 doses      66y/o male w/ T2DM presenting today for penile concern as in HPI  Sxs and findings consistent w/ fungal balanitis  Pt at risk secondary to T2DM and being uncircumcised  Also appears was prescribed Invokana which can cause yeast infection, though pt reported to MA not taking  I advised he continue his clotrimazole combination cream applied TID to affected area x 7 days, since he has only used it for 2-3 days now  Will add fluconazole 150mg take 1 tab every other day x 3 doses  Has f/u scheduled with PCP on 4/29 and will keep that appt  Was advised to call sooner if problem worsens - may need to culture or can try abx if maybe bacterial?  Needs a close eye on BP as was elevated today at 168/85  Also mild tachy though seems to be consistent with past visits as well  Also needs better DM control  Info about COVID vaccine discussed and provided today  Chief Complaint   Patient presents with    Follow-up     Penis head irritation for a week try monistat 7 and antifungal cream with out a relief       Subjective:     Patient ID: Beatriz Mcginnis is a 76 y o  male  66y/o male here today for [facility] appt for penile lesion  States located head of penis x 1 week  States it is very red  No itching, hurts a little  Tried monistat 7 and antifungal cream (notil - clotrimazole gentamycin, betamethasone) and not helping  He denies any new sexual partners  He notes he has been with wife x 20 years  He has T2DM, last Ha1C in Jan 2021 was 8 6  Review of Systems   Constitutional: Negative  Respiratory: Negative  Cardiovascular: Negative  Gastrointestinal: Negative  Genitourinary: Negative for difficulty urinating, dysuria, flank pain, frequency and hematuria          As in HPI         The following portions of the patient's history were reviewed and updated as appropriate: allergies, current medications, past family history, past medical history, past social history, past surgical history and problem list       Objective:     Physical Exam  Vitals signs reviewed  Exam conducted with a chaperone present  Constitutional:       General: He is not in acute distress  Appearance: He is obese  He is not ill-appearing or toxic-appearing  Cardiovascular:      Rate and Rhythm: Regular rhythm  Tachycardia present  Comments: Pt seems to have mild tachy at baseline at all recent previous f/u's  Pulmonary:      Effort: Pulmonary effort is normal       Breath sounds: Normal breath sounds  Genitourinary:     Penis: Uncircumcised  Comments: Penile shaft, suprapubic region w/o lesions or rash  Scrotal sack also appearing normal    Pt is uncircumcised  Eternal foreskin appearing normal   Up pt retracting foreskin moisture with beefy red circular lesions on head of penis consistent with fungal infection  Slight Light yellow-white discharge also noted  No odor  Neurological:      Mental Status: He is alert and oriented to person, place, and time     Psychiatric:         Mood and Affect: Mood normal          Behavior: Behavior normal          Vitals:    04/09/21 1333   BP: 168/85   BP Location: Right arm   Patient Position: Sitting   Cuff Size: Standard   Pulse: 105   Temp: 98 2 °F (36 8 °C)   TempSrc: Temporal   SpO2: 98%   Weight: 91 9 kg (202 lb 9 6 oz)   Height: 5' 5" (1 651 m)

## 2021-04-22 ENCOUNTER — TELEPHONE (OUTPATIENT)
Dept: INTERNAL MEDICINE CLINIC | Facility: CLINIC | Age: 66
End: 2021-04-22

## 2021-04-22 DIAGNOSIS — N48.1 BALANITIS: Primary | ICD-10-CM

## 2021-04-22 NOTE — TELEPHONE ENCOUNTER
Patient would like to know if there's another option for the medication "canagliflozin (Invokana) 100 mg" due to it's costing him $500 for refill due to insurance doesn't cover   Please review

## 2021-04-23 DIAGNOSIS — E11.9 TYPE 2 DIABETES MELLITUS (HCC): ICD-10-CM

## 2021-04-23 NOTE — TELEPHONE ENCOUNTER
Called pharmacy to see if Januvia and Metformin are covered  Per pharmacist patient does not have insurance  Called patient to confirm, per patient he does not have Prescription coverage which is the reason why he had to pay $500  for the Invokana 100mg, for this reason patient has to pay out of pocket  Informed patient we have a medication program that could help him obtain this medication, will route this to [facility]  Also gave patient number for financial counselors to help patient obtaining prescription coverage if possible

## 2021-04-26 NOTE — TELEPHONE ENCOUNTER
Contacted patient via  ID  [id number] ([employer]), Left message in [place] by  to contact MARIBELL [address] 55 for medication program     Patient can apply for Januvia through [email or web address] patient assist program and Invokana through [facility] will determine if patient is eligible for enrollment in their patient assist programs

## 2021-04-27 ENCOUNTER — TELEPHONE (OUTPATIENT)
Dept: INTERNAL MEDICINE CLINIC | Facility: CLINIC | Age: 66
End: 2021-04-27

## 2021-05-04 ENCOUNTER — CONSULT (OUTPATIENT)
Dept: MULTI SPECIALTY CLINIC | Facility: CLINIC | Age: 66
End: 2021-05-04

## 2021-05-04 ENCOUNTER — PATIENT OUTREACH (OUTPATIENT)
Dept: INTERNAL MEDICINE CLINIC | Facility: CLINIC | Age: 66
End: 2021-05-04

## 2021-05-04 ENCOUNTER — LAB (OUTPATIENT)
Dept: LAB | Facility: CLINIC | Age: 66
End: 2021-05-04
Payer: MEDICARE

## 2021-05-04 VITALS
HEART RATE: 97 BPM | SYSTOLIC BLOOD PRESSURE: 164 MMHG | TEMPERATURE: 97.9 F | BODY MASS INDEX: 32.45 KG/M2 | WEIGHT: 195 LBS | DIASTOLIC BLOOD PRESSURE: 77 MMHG

## 2021-05-04 DIAGNOSIS — Z78.9 NEED FOR FOLLOW-UP BY SOCIAL WORKER: Primary | ICD-10-CM

## 2021-05-04 DIAGNOSIS — Z59.9 FINANCIAL PROBLEMS: ICD-10-CM

## 2021-05-04 DIAGNOSIS — N48.1 BALANITIS: ICD-10-CM

## 2021-05-04 DIAGNOSIS — Z12.5 PROSTATE CANCER SCREENING: ICD-10-CM

## 2021-05-04 DIAGNOSIS — Z12.5 PROSTATE CANCER SCREENING: Primary | ICD-10-CM

## 2021-05-04 LAB
CREAT UR-MCNC: 150 MG/DL
MICROALBUMIN UR-MCNC: 56 MG/L (ref 0–20)
MICROALBUMIN/CREAT 24H UR: 37 MG/G CREATININE (ref 0–30)
PSA SERPL-MCNC: 1.5 NG/ML (ref 0–4)

## 2021-05-04 PROCEDURE — 36415 COLL VENOUS BLD VENIPUNCTURE: CPT

## 2021-05-04 PROCEDURE — 82570 ASSAY OF URINE CREATININE: CPT | Performed by: STUDENT IN AN ORGANIZED HEALTH CARE EDUCATION/TRAINING PROGRAM

## 2021-05-04 PROCEDURE — 82043 UR ALBUMIN QUANTITATIVE: CPT | Performed by: STUDENT IN AN ORGANIZED HEALTH CARE EDUCATION/TRAINING PROGRAM

## 2021-05-04 PROCEDURE — 99203 OFFICE O/P NEW LOW 30 MIN: CPT | Performed by: UROLOGY

## 2021-05-04 PROCEDURE — G0103 PSA SCREENING: HCPCS

## 2021-05-04 SDOH — ECONOMIC STABILITY - INCOME SECURITY: PROBLEM RELATED TO HOUSING AND ECONOMIC CIRCUMSTANCES, UNSPECIFIED: Z59.9

## 2021-05-04 NOTE — PATIENT INSTRUCTIONS
applica la crema pro 10 [address], y andelante si tiene mas problemas y desea circumcision, por favor llamame

## 2021-05-04 NOTE — PROGRESS NOTES
[address] [facility]  [facility]  [address] [facility]  [place], [address] [place] [address]  [phone]  www  [facility]  org      NAME: Alexis Kerns  AGE: 76 y o  SEX: male  : 1955   MRN: [id number]    DATE: 2021  TIME: 8:03 AM    Assessment and Plan:    Balanitis:  He is uncircumcised and this is a mild case  He would like a medication  I recommend Mycolog 2 ointment  I will prescribe this  Check PSA and send him the results for prostate cancer screening  If he keeps having episodes like this, I mentioned that he may want to have a circumcision which will cure the condition  He will call if this occurs  Chief Complaint     Chief Complaint   Patient presents with    Consult     balanitis       History of Present Illness     Start [address] patient office visit for balanitis  Patient has diabetes  No record of PSA  He has a past medical history of stroke  This current episode of balanitis has been going on for about 25 days  The last episode he had was a couple of years ago  He took Monistat for in the past which helped and then a combination of clotrimazole, gentamicin and steroid which he obtained in [facility]  However these are not helping him  He recently had 3 day dose of Diflucan which did not help  No gross hematuria  The following portions of the patient's history were reviewed and updated as appropriate: allergies, current medications, past family history, past medical history, past social history, past surgical history and problem list   Past Medical History:   Diagnosis Date    Stroke [facility])      No past surgical history on file  shoulder  Review of Systems   Review of Systems   Genitourinary: Negative          Active Problem List     Patient Active Problem List   Diagnosis    Pneumonia due to COVID-19 virus    Sepsis ([facility] )    Hyponatremia    Type 2 diabetes mellitus ([facility] )       Objective   /77 (BP Location: Right arm, Patient Position: Sitting, Cuff Size: Standard)   Pulse 97   Temp 97 9 °F (36 6 °C) (Temporal)   Wt 88 5 kg (195 lb)   BMI 32 45 kg/m²     Physical Exam  Genitourinary:     Comments: Testicles are descended bilaterally within normal limits  No inguinal hernia  He has an uncircumcised phallus with some mild red spots, no major balanitis  No discharge from the urethra  Current Medications     Current Outpatient Medications:     aspirin (ECOTRIN LOW STRENGTH) 81 mg EC tablet, Take 1 tablet (81 mg total) by mouth daily, Disp: 90 tablet, Rfl: 3    atorvastatin (LIPITOR) 40 mg tablet, Take 1 tablet (40 mg total) by mouth daily at bedtime, Disp: 90 tablet, Rfl: 1    Blood Glucose Monitoring Suppl (OneTouch Verio Reflect) w/Device KIT, Use 1 kit 2 (two) times a day Please check sugars twice a day, Disp: 1 kit, Rfl: 0    Blood Pressure KIT, Use 2 (two) times a day, Disp: 1 each, Rfl: 0    glucose blood (OneTouch Verio) test strip, Check blood glucoses up to once per day and keep a log to bring to your appointment  , Disp: 100 each, Rfl: 2    sitaGLIPtin (JANUVIA) 100 mg tablet, Take 1 tablet (100 mg total) by mouth daily, Disp: 30 tablet, Rfl: 2    metFORMIN (GLUCOPHAGE) 500 mg tablet, Take 2 tablets (1,000 mg total) by mouth 2 (two) times a day with meals, Disp: 120 tablet, Rfl: 0        Fabiola Chinchilla MD

## 2021-05-04 NOTE — LETTER
May 4, 2021     Christian Oliver DO  [address]    Patient: Shahrzad Rossi   YOB: 1955   Date of Visit: 2021       Dear Dr Hakan Webb: Thank you for referring Shahrzad Rossi to me for evaluation  Below are my notes for this consultation  If you have questions, please do not hesitate to call me  I look forward to following your patient along with you  Sincerely,        [id number]        CC: No Recipients  Nani Giraldo MD  2021  8:59 AM  Sign when Signing Visit  [address] [facility]  [place] [phone]  www  [facility]  org      NAME: Shahrzad Rossi  AGE: 76 y o  SEX: male  : 1955   MRN: [id number]    DATE: 2021  TIME: 8:03 AM    Assessment and Plan:    Balanitis:  He is uncircumcised and this is a mild case  He would like a medication  I recommend Mycolog 2 ointment  I will prescribe this  Check PSA and send him the results for prostate cancer screening  If he keeps having episodes like this, I mentioned that he may want to have a circumcision which will cure the condition  He will call if this occurs  Chief Complaint     Chief Complaint   Patient presents with    Consult     balanitis       History of Present Illness     Start [address] patient office visit for balanitis  Patient has diabetes  No record of PSA  He has a past medical history of stroke  This current episode of balanitis has been going on for about 25 days  The last episode he had was a couple of years ago  He took Monistat for in the past which helped and then a combination of clotrimazole, gentamicin and steroid which he obtained in [facility]  However these are not helping him  He recently had 3 day dose of Diflucan which did not help  No gross hematuria        The following portions of the patient's history were reviewed and updated as appropriate: allergies, current medications, past family history, past medical history, past social history, past surgical history and problem list   Past Medical History:   Diagnosis Date    Stroke [facility])      No past surgical history on file  shoulder  Review of Systems   Review of Systems   Genitourinary: Negative  Active Problem List     Patient Active Problem List   Diagnosis    Pneumonia due to COVID-19 virus    Sepsis ([address] )    Hyponatremia    Type 2 diabetes mellitus (HCC)       Objective   /77 (BP Location: Right arm, Patient Position: Sitting, Cuff Size: Standard)   Pulse 97   Temp 97 9 °F (36 6 °C) (Temporal)   Wt 88 5 kg (195 lb)   BMI 32 45 kg/m²     Physical Exam  Genitourinary:     Comments: Testicles are descended bilaterally within normal limits  No inguinal hernia  He has an uncircumcised phallus with some mild red spots, no major balanitis  No discharge from the urethra  Current Medications     Current Outpatient Medications:     aspirin (ECOTRIN LOW STRENGTH) 81 mg EC tablet, Take 1 tablet (81 mg total) by mouth daily, Disp: 90 tablet, Rfl: 3    atorvastatin (LIPITOR) 40 mg tablet, Take 1 tablet (40 mg total) by mouth daily at bedtime, Disp: 90 tablet, Rfl: 1    Blood Glucose Monitoring Suppl (OneTouch Verio Reflect) w/Device KIT, Use 1 kit 2 (two) times a day Please check sugars twice a day, Disp: 1 kit, Rfl: 0    Blood Pressure KIT, Use 2 (two) times a day, Disp: 1 each, Rfl: 0    glucose blood (OneTouch Verio) test strip, Check blood glucoses up to once per day and keep a log to bring to your appointment  , Disp: 100 each, Rfl: 2    sitaGLIPtin (JANUVIA) 100 mg tablet, Take 1 tablet (100 mg total) by mouth daily, Disp: 30 tablet, Rfl: 2    metFORMIN (GLUCOPHAGE) 500 mg tablet, Take 2 tablets (1,000 mg total) by mouth 2 (two) times a day with meals, Disp: 120 tablet, Rfl: 0        Shefali Austin MD

## 2021-05-04 NOTE — PROGRESS NOTES
YOLIE had received a referral from [facility],  in regards to this patient's financial problems  SWSURAJ had met with the patient in person  SWSURAJ spoke in the preferred language, [place]  [facility] notes Migue Chamorro [phone] was also in the room  SWCM helped translate for Gregoria Barragan  SWSURAJ asked the patient if he had medicare part D  Patient stated no that he needs to wait until October 2021 to apply for medicare part D  Patient stated he called the  office and they explained to him that he would need to wait until then as it will be the next year January 2022 that he can get approved  SW asked the patient if he had applied for financial assistance with [address] [place] [address]-Financial Counselors [phone]  Patient stated he applied and mailed out documents but has not hear back in over 3 weeks  SWCM offered to call the the financial counselors to see about his application  Patient is agreeable  TRAN had informed the patient about the PACE program that offer low-cost prescription medication to qualified residents, age 72 and older  SWCM had gone over the income based qualifications  Patient stated he receives $410 from Pension and his wife, Santy Holley receives $15 and hour 40 hours per week  Patient stated in 2020 they made over 35,000 with unemployment as well  SW notes based on this information the patient may not qualify for assistance or any other assistance  Patient verbalized understanding as he tried to apply for other assistance but due to income for him and his wife he does not qualify  SW offered to have the patient apply for the [facility] assistance program with the office financial counselors [phone]  Patient is agreeable  SW advised patient ask for her when he is done with the office visit and sees Laura Henao for medication assistance  Patient verbalized understanding  Patient lives with his wife and older daughter   Patient stated there are 3 vehicles in the one for each person  Patient stated he has had a difficult time trying to sell the vehicles due to the pandemic  Patient stated his daughter is doing online schooling but planing on going into the [employer] soon  Patient stated he had COVID-19 back in January 2021 had anemia and difficulty breathing  Patient stated hospital bill was expensive had paid 2,000 of what he owed  Patient stated it is hard to pay bills, mortgage, food, cars etc  [facility] provided support counseling  SW asked the patient if he had any other concerns at this time  Patient denied any other needs at this time  [facility] provided her contact info to the patient as well as the contact info for [address]  [facility] will met in person with the Financial Counselors Coordinator, Mariela Joe in regards to insurance coverage  [facility] will continue to follow up  Addendum:    [facility] notes [address] had reached out to [address] [place] [address]  [address] [place] [address] had spoke with Mahi Cabrera who stated the patient owed additional documents  [facility] had seen if the patient was still with [place] as Юлия needed to speak with the patient  Mahi Cabrera stated to call back Ext  [phone] when the patient is in the office to speak with him about the additional documents  [place] stated the patient had left as he needed to get his lab work and was hungry  TRAN had called the patient but no response  [facility] left a voicemail  [facility] had then called Юлия back and stated the patient had left after his lab work as he was hungry  Mahi Cabrera stated she would call the patient and his wife to go over the missing documents  [facility] met with Mahi Hicks in person to make a referral for financial assistance  Mahi Hicks stated they would outreach for services  [facility] had Todd Leyva to inform her that [address] [place] [address] and our office financial counselors would be outreaching to patient and her for services   [facility] had left a voicemail  SWCM will continue to follow up to ensure connection to services

## 2021-05-05 ENCOUNTER — TELEPHONE (OUTPATIENT)
Dept: INTERNAL MEDICINE CLINIC | Facility: CLINIC | Age: 66
End: 2021-05-05

## 2021-05-05 ENCOUNTER — TELEPHONE (OUTPATIENT)
Dept: UROLOGY | Facility: AMBULATORY SURGERY CENTER | Age: 66
End: 2021-05-05

## 2021-05-05 DIAGNOSIS — E11.69 TYPE 2 DIABETES MELLITUS WITH OTHER SPECIFIED COMPLICATION, UNSPECIFIED WHETHER LONG TERM INSULIN USE (HCC): Primary | ICD-10-CM

## 2021-05-05 RX ORDER — LISINOPRIL 10 MG/1
10 TABLET ORAL DAILY
Qty: 30 TABLET | Refills: 1 | Status: SHIPPED | OUTPATIENT
Start: 2021-05-05 | End: 2021-05-19

## 2021-05-05 NOTE — TELEPHONE ENCOUNTER
Called the pt to discuss result and also sending lisinopril w/ hx of diabetes / HTN and microalbuminuria

## 2021-05-05 NOTE — TELEPHONE ENCOUNTER
----- Message from Ousmane Connell MD sent at 5/5/2021 10:21 AM EDT -----  Please inform patient that the results are normal

## 2021-05-11 ENCOUNTER — PATIENT OUTREACH (OUTPATIENT)
Dept: INTERNAL MEDICINE CLINIC | Facility: CLINIC | Age: 66
End: 2021-05-11

## 2021-05-11 NOTE — PROGRESS NOTES
YOLIE had spoke with CHTRIP Shirley in regards to who she can email in regards to financial assistance  TAMEKA Cadena to email Wicho Larose, Financial Counselor  YOLIE had emailed Tamika Coppola in regards to assistance with services for this patient  YOLIE had received a prompt response from Tamika Coppola in regards to assisting this patient  Tamika Coppola stated he would follow up  YOLIE will continue to follow up

## 2021-05-19 DIAGNOSIS — E11.69 TYPE 2 DIABETES MELLITUS WITH OTHER SPECIFIED COMPLICATION, UNSPECIFIED WHETHER LONG TERM INSULIN USE (HCC): ICD-10-CM

## 2021-05-19 RX ORDER — LISINOPRIL 10 MG/1
TABLET ORAL
Qty: 90 TABLET | Refills: 2 | Status: SHIPPED | OUTPATIENT
Start: 2021-05-19

## 2021-05-24 ENCOUNTER — TELEMEDICINE (OUTPATIENT)
Dept: FAMILY MEDICINE CLINIC | Facility: CLINIC | Age: 66
End: 2021-05-24
Payer: MEDICARE

## 2021-05-24 DIAGNOSIS — E11.9 TYPE 2 DIABETES MELLITUS WITHOUT COMPLICATION, WITHOUT LONG-TERM CURRENT USE OF INSULIN (HCC): ICD-10-CM

## 2021-05-24 PROCEDURE — G0108 DIAB MANAGE TRN  PER INDIV: HCPCS | Performed by: DIETITIAN, REGISTERED

## 2021-05-24 NOTE — Clinical Note
Provided Diabetes Education over the phone, in [facility] [place]  Patient verbalized understanding  From what I could understand of the phone talk, patient is not checking his blood sugars ( does not have meter), and is not taking any of the oral hypoglycemic agents prescribed  Reminded patient of the importance of discussing all of this with his PCP/ Provider at the clinic at his next appointment

## 2021-05-24 NOTE — PROGRESS NOTES
Virtual Brief Visit  It was my intent to perform this visit via video technology but the patient was not able to do a video connection so the visit was completed via audio telephone only  Assessment/Plan:  Type 2 Diabetes Class Assessment    HPI: Met with Pat Hunter for DSME Initial visit  Ann Pyle has Type 2 Diabetes  Diabetes Assessment  Visit Type: Initial visit  Present at Session: patient   Special Learning Needs: No  Barriers to Learning: no barriers    How do you feel about making lifestyle changes at this time? "Good, I want to help myself, get better "  How would you rate your current knowledge of diabetes? fair  How confident are you that will be able to take better control of your diabetes?: good    How long have you had diabetes? 3 - 4 months  Have you had diabetes education in the past?: No  Do you have any family members with diabetes?: Yes - one brother  Do you monitor your blood sugar? no  Type of blood sugar monitor: n/a  How old is your meter?: n/a  How often do you test your blood sugars?:n/a  Do you keep a written record of your blood sugars? No   Blood sugar log with patient today and reviewed by educator?: No   Blood Sugar ranges:    Fasting: doesn't check   Before meals: n/a   2 hours after meals: n/a  Any financial concerns pertaining to your diabetes supplies, medication or care?: Yes - patient cannot afford his medication, and does not have a meter  I suggested he purchase a meter ans strips at [facility]  I told him to talk with his PCP about medications , and possibly receive assistance with this  Have you ever experienced hypoglycemia?:  No  Have you ever been hospitalized or gone to the ER due to your blood sugars?: No  How do you treat low blood sugars?: n/a  How do you treat high blood sugars? n/a  Do you wear a Diabetes I D ?: no, will provide information    Where do you dispose of your sharps (needles,lancetes)?: n/a (doesn't check his blood sugars)    Ht Readings from Last 1 Encounters:   04/09/21 5' 5" (1 651 m)     Wt Readings from Last 3 Encounters:   05/04/21 88 5 kg (195 lb)   04/09/21 91 9 kg (202 lb 9 6 oz)   03/25/21 91 7 kg (202 lb 3 2 oz)        There is no height or weight on file to calculate BMI  Lab Results   Component Value Date    HGBA1C 8 6 (H) 01/22/2021       No results found for: CHOL  Lab Results   Component Value Date    HDL 35 (L) 02/12/2021     Lab Results   Component Value Date    LDLCALC 110 (H) 02/12/2021     Lab Results   Component Value Date    TRIG 287 0 (H) 02/12/2021     No results found for: CHOLHDL  No results found for: Sary Hutson    Weight Change: Yes he has lost ~ 7lbs  over the past few months  Diet Assessment    Do you follow any special diet presently?: Yes - Patient states he is trying to eat more healthy, avoiding soda and sweets  Diet History: Breakfast: coffee, breakfast sandwich, Lunch: rice and beans, chicken, salad, Dinner : same as lunch, or makes home-made soup  Likes fresh fruit and vegetables, minimal snacking reported  Drinks mostly water as beverage     Who cooks at home?:  patient  Who does the grocery shopping?: patient   How frequently do you eat out?: hardly ever    Activity Assessment    Exercise: starting to walk more    Lifestyle/Social Assessment    Racial/ethnic group:                                       Primary Language: Azeri  Marital Status:   Education Level: High School Graduate   Work status: Unemployed  Type of job and hours: n/a  Who lives in your household?: spouse and children  Who is you primary support person(s): spouse and children   Describe your quality of life currently?: good  Any concerns for your safety?: No  Any Yazdanism or cultural practices that may affect your diabetes care: No  Do you have a decrease or loss of hearing?: No  Do you have a decrease or loss of vision?: No  When was the last time you had an ophthalmology exam?: it's been a long time  When was the last time you had dental exam?: a long time ago, " no problems"   Do you check your feet for cracks, sores, debris?: No  When was the last time you had podiatry or foot exam?: about 2 months ago, at PCP visit  Last flu shot?: n/a  Pneumonia shot?: No      The patient's history was reviewed and updated as appropriate: allergies, current medications  Intervention    Diabetes Overview :   Ayanna Lance was instructed on basic concepts of diabetes, including identifying role of diabetes self management, basic pathophysiology and types of diabetes, A1c and blood sugar targets  Ayanna Lance has good understanding of material covered  Taking Medications: Instructed patient on action, side effects, efficacy, prescribed dosage and appropriate timing and frequency of administration of his diabetes medication  Ayanna Lance has fair understanding of material covered, and from what I understand, he is not taking any medication for his Diabetes  Monitoring Blood Sugars  Instructions for Meter Teaching- Patient instructed in the following:    Testing frequency: Encouraged patient to get a meter, either through the [facility], or buy one at [facility]  Explained the [employer] brand is inexpensive  Explained benefits of regular consistent monitoring  Goal Blood Sugars:   Premeal , even better <110  2hr after a meal <180, even better <140  A1C <7%, even better <6 5%  Hypoglycemia: Instructed patient on definition/risk of hypoglycemia, treatment, causes/symptoms, when to notify provider of lows, prevention of hypoglycemia and exercise precautions  Comments: Mendoza Crespo understanding of hypoglycemia concepts      Physical Activity: Discussed benefits of physical activity to optimize blood glucose control, encouraged activity as patient is physically able  Always consult a physician prior to starting an exercise program   Comments: Mendoza Crespo understanding of benefits of regular physical activity     Diabetes Education Record  Sabine Tae received the following handouts: Introductory packet of Diabetes Education including "My Plate" and other Nutrition education, all in [place]  Patient response to instruction    Comprehensionvery good  Motivationgood  Expected Compliancegood    Thank you for referring your patient to Colin Ramirez, it was a pleasure working with them today  Please feel free to call with any questions or concerns at   [facility] @ [address]    Problem List Items Addressed This Visit        Endocrine    Type 2 diabetes mellitus ([address] )                Reason for visit is   Chief Complaint   Patient presents with    Virtual Brief Visit        Encounter provider [address]  [facility]      Recent Visits  No visits were found meeting these conditions  Showing recent visits within past 7 days and meeting all other requirements     Today's Visits  Date Type Provider Dept   05/24/21 Telemedicine [facility] today's visits and meeting all other requirements     Future Appointments  No visits were found meeting these conditions  Showing future appointments within next 150 days and meeting all other requirements        After connecting through telephone and patient was informed that this is not a secure, HIPAA-compliant platform  He agrees to proceed  , the patient was identified by name and date of birth  Tucker Joyce was informed that this is a telemedicine visit and that the visit is being conducted through telephone and patient was informed that this is not a secure, HIPAA-compliant platform  He agrees to proceed     My office door was closed  No one else was in the room  He acknowledged consent and understanding of privacy and security of the platform  The patient has agreed to participate and understands he can discontinue the visit at any time      Patient is aware this is a billable service  Subjective    Nelda Lorenzana is a 76 y o  male with type 2 diabetes  HPI     Past Medical History:   Diagnosis Date    Stroke [facility])        No past surgical history on file  Current Outpatient Medications   Medication Sig Dispense Refill    aspirin (ECOTRIN LOW STRENGTH) 81 mg EC tablet Take 1 tablet (81 mg total) by mouth daily 90 tablet 3    atorvastatin (LIPITOR) 40 mg tablet Take 1 tablet (40 mg total) by mouth daily at bedtime 90 tablet 1    Blood Glucose Monitoring Suppl (OneTouch Verio Reflect) w/Device KIT Use 1 kit 2 (two) times a day Please check sugars twice a day 1 kit 0    Blood Pressure KIT Use 2 (two) times a day 1 each 0    glucose blood (OneTouch Verio) test strip Check blood glucoses up to once per day and keep a log to bring to your appointment  100 each 2    lisinopril (ZESTRIL) 10 mg tablet TAKE 1 TABLET BY MOUTH EVERY DAY 90 tablet 2    metFORMIN (GLUCOPHAGE) 500 mg tablet Take 2 tablets (1,000 mg total) by mouth 2 (two) times a day with meals 120 tablet 0    nystatin-triamcinolone (MYCOLOG-II) cream Apply topically 4 (four) times a day 30 g 0    sitaGLIPtin (JANUVIA) 100 mg tablet Take 1 tablet (100 mg total) by mouth daily 30 tablet 2     No current facility-administered medications for this visit  No Known Allergies    Review of Systems    There were no vitals filed for this visit  I spent 30 minutes with patient today in which greater than 50% of the time was spent in counseling/coordination of care regarding type 2 diabetes  VIRTUAL VISIT DISCLAIMER    Nelda Lorenzana acknowledges that he has consented to an online visit or consultation  He understands that the online visit is based solely on information provided by him, and that, in the absence of a face-to-face physical evaluation by the physician, the diagnosis he receives is both limited and provisional in terms of accuracy and completeness   This is not intended to replace a full medical face-to-face evaluation by the physician  Jo-Ann Galvin understands and accepts these terms

## 2021-05-25 ENCOUNTER — PATIENT OUTREACH (OUTPATIENT)
Dept: INTERNAL MEDICINE CLINIC | Facility: CLINIC | Age: 66
End: 2021-05-25

## 2021-05-25 NOTE — PROGRESS NOTES
YOLIE had received an email from [employer], Financial Counselor in regards to this patient's assistance a few weeks ago  Tamika Coppola informed YOLIE that he had started the [facility] process  YOLIE had emailed Tamika Coppola back thanking him for the update  Tamika Coppola will continue to assist patient with insurance coverage  YOLIE will continued to be available as needed  YOLIE will be closing case as patient is connect and working with financial counselors  Please reconsult TRAN for future needs

## 2021-06-08 DIAGNOSIS — E11.9 TYPE 2 DIABETES MELLITUS (HCC): ICD-10-CM

## 2021-07-15 ENCOUNTER — TELEPHONE (OUTPATIENT)
Dept: UROLOGY | Facility: AMBULATORY SURGERY CENTER | Age: 66
End: 2021-07-15

## 2021-07-15 DIAGNOSIS — N48.1 BALANITIS: ICD-10-CM

## 2021-07-15 NOTE — TELEPHONE ENCOUNTER
Patient managed by Abraham Frank at the [facility] is calling for a refill on   nystatin-triamcinolone (MYCOLOG-II) cream

## 2021-07-15 NOTE — TELEPHONE ENCOUNTER
Name of medication, dose, quantity and frequency    Requested Prescriptions     Pending Prescriptions Disp Refills    nystatin-triamcinolone (MYCOLOG-II) cream 30 g 0     Sig: Apply topically 4 (four) times a day     Number of refills left:  0    Amount of medication left: 0    Pharmacy verified and updated  yes    Additional information:    The above cream was prescribed by Dr Garrett Aguilar (urologist) on 5/4/21

## 2021-07-16 NOTE — TELEPHONE ENCOUNTER
The requested medication is already queued for refill by another provider  No further action required

## 2021-07-23 ENCOUNTER — APPOINTMENT (OUTPATIENT)
Dept: LAB | Facility: CLINIC | Age: 66
End: 2021-07-23
Payer: MEDICARE

## 2021-07-23 DIAGNOSIS — R80.9 PERSISTENT MICROALBUMINURIA ASSOCIATED WITH TYPE 2 DIABETES MELLITUS (HCC): ICD-10-CM

## 2021-07-23 DIAGNOSIS — E78.2 MIXED HYPERLIPIDEMIA: ICD-10-CM

## 2021-07-23 DIAGNOSIS — E11.29 PERSISTENT MICROALBUMINURIA ASSOCIATED WITH TYPE 2 DIABETES MELLITUS (HCC): ICD-10-CM

## 2021-07-23 LAB
ALBUMIN SERPL BCP-MCNC: 3.8 G/DL (ref 3.5–5)
ALP SERPL-CCNC: 87 U/L (ref 46–116)
ALT SERPL W P-5'-P-CCNC: 25 U/L (ref 12–78)
ANION GAP SERPL CALCULATED.3IONS-SCNC: 6 MMOL/L (ref 4–13)
AST SERPL W P-5'-P-CCNC: 19 U/L (ref 5–45)
BASOPHILS # BLD AUTO: 0.04 THOUSANDS/ΜL (ref 0–0.1)
BASOPHILS NFR BLD AUTO: 1 % (ref 0–1)
BILIRUB SERPL-MCNC: 0.34 MG/DL (ref 0.2–1)
BUN SERPL-MCNC: 13 MG/DL (ref 5–25)
CALCIUM SERPL-MCNC: 9.3 MG/DL (ref 8.3–10.1)
CHLORIDE SERPL-SCNC: 102 MMOL/L (ref 100–108)
CHOLEST SERPL-MCNC: 160 MG/DL (ref 50–200)
CO2 SERPL-SCNC: 29 MMOL/L (ref 21–32)
CREAT SERPL-MCNC: 0.75 MG/DL (ref 0.6–1.3)
EOSINOPHIL # BLD AUTO: 0.36 THOUSAND/ΜL (ref 0–0.61)
EOSINOPHIL NFR BLD AUTO: 4 % (ref 0–6)
ERYTHROCYTE [DISTWIDTH] IN BLOOD BY AUTOMATED COUNT: 15.5 % (ref 11.6–15.1)
GFR SERPL CREATININE-BSD FRML MDRD: 95 ML/MIN/1.73SQ M
GLUCOSE P FAST SERPL-MCNC: 110 MG/DL (ref 65–99)
HCT VFR BLD AUTO: 44.3 % (ref 36.5–49.3)
HDLC SERPL-MCNC: 35 MG/DL
HGB BLD-MCNC: 14.3 G/DL (ref 12–17)
IMM GRANULOCYTES # BLD AUTO: 0.05 THOUSAND/UL (ref 0–0.2)
IMM GRANULOCYTES NFR BLD AUTO: 1 % (ref 0–2)
LDLC SERPL CALC-MCNC: 89 MG/DL (ref 0–100)
LYMPHOCYTES # BLD AUTO: 1.69 THOUSANDS/ΜL (ref 0.6–4.47)
LYMPHOCYTES NFR BLD AUTO: 20 % (ref 14–44)
MCH RBC QN AUTO: 28.9 PG (ref 26.8–34.3)
MCHC RBC AUTO-ENTMCNC: 32.3 G/DL (ref 31.4–37.4)
MCV RBC AUTO: 90 FL (ref 82–98)
MONOCYTES # BLD AUTO: 0.59 THOUSAND/ΜL (ref 0.17–1.22)
MONOCYTES NFR BLD AUTO: 7 % (ref 4–12)
NEUTROPHILS # BLD AUTO: 5.84 THOUSANDS/ΜL (ref 1.85–7.62)
NEUTS SEG NFR BLD AUTO: 67 % (ref 43–75)
NONHDLC SERPL-MCNC: 125 MG/DL
NRBC BLD AUTO-RTO: 0 /100 WBCS
PLATELET # BLD AUTO: 282 THOUSANDS/UL (ref 149–390)
PMV BLD AUTO: 9.9 FL (ref 8.9–12.7)
POTASSIUM SERPL-SCNC: 4.2 MMOL/L (ref 3.5–5.3)
PROT SERPL-MCNC: 7.6 G/DL (ref 6.4–8.2)
RBC # BLD AUTO: 4.95 MILLION/UL (ref 3.88–5.62)
SODIUM SERPL-SCNC: 137 MMOL/L (ref 136–145)
TRIGL SERPL-MCNC: 179 MG/DL
TSH SERPL DL<=0.05 MIU/L-ACNC: 1.91 UIU/ML (ref 0.36–3.74)
WBC # BLD AUTO: 8.57 THOUSAND/UL (ref 4.31–10.16)

## 2021-07-23 PROCEDURE — 80053 COMPREHEN METABOLIC PANEL: CPT

## 2021-07-23 PROCEDURE — 80061 LIPID PANEL: CPT

## 2021-07-23 PROCEDURE — 84443 ASSAY THYROID STIM HORMONE: CPT

## 2021-07-23 PROCEDURE — 85025 COMPLETE CBC W/AUTO DIFF WBC: CPT

## 2021-10-19 NOTE — TELEPHONE ENCOUNTER
It looks like since the authorization for these diabetic medications (Januvia vs Invokana) had been in progress since March 2021, the patient has since been following closely with endocrinology at [facility], who have increased his Metformin and started him on a weekly injectable instead of Invokana or Januvia  His A1c has subsequently been improving  Given that he is being managed by an endocrinologist with [facility], and has his medications being adjusted by them, as well as he has not returned to our clinic to see a PCP since then, I would not have him  the [address] until he either 1) discusses it with his current endocrinologist or 2) comes back for a PCP visit and further medication adjustments

## 2021-10-21 NOTE — TELEPHONE ENCOUNTER
Januvia discarded, witnessed by Ni Alejo (WALT)    Januvia 100 mg  Qty: 90 tablets  Lot # [id number]  Expiry date: 11/16/2023    Domingo Carrera was received through the [facility] patient assist program

## 2021-11-09 ENCOUNTER — APPOINTMENT (OUTPATIENT)
Dept: LAB | Facility: CLINIC | Age: 66
End: 2021-11-09
Payer: MEDICARE

## 2021-11-09 DIAGNOSIS — E55.9 AVITAMINOSIS D: ICD-10-CM

## 2021-11-09 DIAGNOSIS — R80.9 PERSISTENT PROTEINURIA ASSOCIATED WITH TYPE 2 DIABETES MELLITUS (HCC): ICD-10-CM

## 2021-11-09 DIAGNOSIS — R53.83 FATIGUE, UNSPECIFIED TYPE: ICD-10-CM

## 2021-11-09 DIAGNOSIS — E11.29 PERSISTENT PROTEINURIA ASSOCIATED WITH TYPE 2 DIABETES MELLITUS (HCC): ICD-10-CM

## 2021-11-09 DIAGNOSIS — E78.5 HYPERLIPIDEMIA, UNSPECIFIED HYPERLIPIDEMIA TYPE: ICD-10-CM

## 2021-11-09 LAB
25(OH)D3 SERPL-MCNC: 34 NG/ML (ref 30–100)
ALBUMIN SERPL BCP-MCNC: 3.6 G/DL (ref 3.5–5)
ALP SERPL-CCNC: 57 U/L (ref 46–116)
ALT SERPL W P-5'-P-CCNC: 22 U/L (ref 12–78)
ANION GAP SERPL CALCULATED.3IONS-SCNC: 7 MMOL/L (ref 4–13)
AST SERPL W P-5'-P-CCNC: 18 U/L (ref 5–45)
BASOPHILS # BLD AUTO: 0.05 THOUSANDS/ΜL (ref 0–0.1)
BASOPHILS NFR BLD AUTO: 1 % (ref 0–1)
BILIRUB SERPL-MCNC: 0.34 MG/DL (ref 0.2–1)
BUN SERPL-MCNC: 11 MG/DL (ref 5–25)
CALCIUM SERPL-MCNC: 8.8 MG/DL (ref 8.3–10.1)
CHLORIDE SERPL-SCNC: 105 MMOL/L (ref 100–108)
CHOLEST SERPL-MCNC: 119 MG/DL (ref 50–200)
CO2 SERPL-SCNC: 26 MMOL/L (ref 21–32)
CREAT SERPL-MCNC: 0.9 MG/DL (ref 0.6–1.3)
EOSINOPHIL # BLD AUTO: 0.28 THOUSAND/ΜL (ref 0–0.61)
EOSINOPHIL NFR BLD AUTO: 4 % (ref 0–6)
ERYTHROCYTE [DISTWIDTH] IN BLOOD BY AUTOMATED COUNT: 15.2 % (ref 11.6–15.1)
GFR SERPL CREATININE-BSD FRML MDRD: 88 ML/MIN/1.73SQ M
GLUCOSE P FAST SERPL-MCNC: 116 MG/DL (ref 65–99)
HCT VFR BLD AUTO: 43.8 % (ref 36.5–49.3)
HDLC SERPL-MCNC: 34 MG/DL
HGB BLD-MCNC: 13.9 G/DL (ref 12–17)
IMM GRANULOCYTES # BLD AUTO: 0.01 THOUSAND/UL (ref 0–0.2)
IMM GRANULOCYTES NFR BLD AUTO: 0 % (ref 0–2)
LDLC SERPL CALC-MCNC: 57 MG/DL (ref 0–100)
LYMPHOCYTES # BLD AUTO: 1.49 THOUSANDS/ΜL (ref 0.6–4.47)
LYMPHOCYTES NFR BLD AUTO: 19 % (ref 14–44)
MCH RBC QN AUTO: 29.6 PG (ref 26.8–34.3)
MCHC RBC AUTO-ENTMCNC: 31.7 G/DL (ref 31.4–37.4)
MCV RBC AUTO: 93 FL (ref 82–98)
MONOCYTES # BLD AUTO: 0.65 THOUSAND/ΜL (ref 0.17–1.22)
MONOCYTES NFR BLD AUTO: 8 % (ref 4–12)
NEUTROPHILS # BLD AUTO: 5.4 THOUSANDS/ΜL (ref 1.85–7.62)
NEUTS SEG NFR BLD AUTO: 68 % (ref 43–75)
NONHDLC SERPL-MCNC: 85 MG/DL
NRBC BLD AUTO-RTO: 0 /100 WBCS
PLATELET # BLD AUTO: 267 THOUSANDS/UL (ref 149–390)
PMV BLD AUTO: 10.1 FL (ref 8.9–12.7)
POTASSIUM SERPL-SCNC: 4.1 MMOL/L (ref 3.5–5.3)
PROT SERPL-MCNC: 7.2 G/DL (ref 6.4–8.2)
RBC # BLD AUTO: 4.7 MILLION/UL (ref 3.88–5.62)
SODIUM SERPL-SCNC: 138 MMOL/L (ref 136–145)
TRIGL SERPL-MCNC: 140 MG/DL
WBC # BLD AUTO: 7.88 THOUSAND/UL (ref 4.31–10.16)

## 2021-11-09 PROCEDURE — 80053 COMPREHEN METABOLIC PANEL: CPT

## 2021-11-09 PROCEDURE — 84403 ASSAY OF TOTAL TESTOSTERONE: CPT

## 2021-11-09 PROCEDURE — 80061 LIPID PANEL: CPT

## 2021-11-09 PROCEDURE — 85025 COMPLETE CBC W/AUTO DIFF WBC: CPT

## 2021-11-09 PROCEDURE — 82306 VITAMIN D 25 HYDROXY: CPT

## 2021-11-09 PROCEDURE — 84402 ASSAY OF FREE TESTOSTERONE: CPT

## 2021-11-10 LAB
TESTOST FREE SERPL-MCNC: 6.7 PG/ML (ref 6.6–18.1)
TESTOST SERPL-MCNC: 322 NG/DL (ref 264–916)

## 2021-12-08 DIAGNOSIS — N48.1 BALANITIS: ICD-10-CM

## 2022-03-07 DIAGNOSIS — N48.1 BALANITIS: ICD-10-CM

## 2022-03-21 ENCOUNTER — APPOINTMENT (OUTPATIENT)
Dept: LAB | Facility: CLINIC | Age: 67
End: 2022-03-21
Payer: MEDICARE

## 2022-03-21 DIAGNOSIS — E11.69 DIABETES MELLITUS ASSOCIATED WITH HORMONAL ETIOLOGY (HCC): ICD-10-CM

## 2022-03-21 DIAGNOSIS — E78.2 MIXED HYPERLIPIDEMIA: ICD-10-CM

## 2022-03-21 LAB
ALBUMIN SERPL BCP-MCNC: 3.8 G/DL (ref 3.5–5)
ALP SERPL-CCNC: 77 U/L (ref 46–116)
ALT SERPL W P-5'-P-CCNC: 36 U/L (ref 12–78)
ANION GAP SERPL CALCULATED.3IONS-SCNC: 6 MMOL/L (ref 4–13)
AST SERPL W P-5'-P-CCNC: 20 U/L (ref 5–45)
BILIRUB SERPL-MCNC: 0.36 MG/DL (ref 0.2–1)
BUN SERPL-MCNC: 17 MG/DL (ref 5–25)
CALCIUM SERPL-MCNC: 9 MG/DL (ref 8.3–10.1)
CHLORIDE SERPL-SCNC: 106 MMOL/L (ref 100–108)
CO2 SERPL-SCNC: 26 MMOL/L (ref 21–32)
CREAT SERPL-MCNC: 0.73 MG/DL (ref 0.6–1.3)
EST. AVERAGE GLUCOSE BLD GHB EST-MCNC: 160 MG/DL
GFR SERPL CREATININE-BSD FRML MDRD: 95 ML/MIN/1.73SQ M
GLUCOSE P FAST SERPL-MCNC: 129 MG/DL (ref 65–99)
HBA1C MFR BLD: 7.2 %
POTASSIUM SERPL-SCNC: 3.8 MMOL/L (ref 3.5–5.3)
PROT SERPL-MCNC: 7.3 G/DL (ref 6.4–8.2)
SODIUM SERPL-SCNC: 138 MMOL/L (ref 136–145)
TSH SERPL DL<=0.05 MIU/L-ACNC: 2.23 UIU/ML (ref 0.36–3.74)

## 2022-03-21 PROCEDURE — 84443 ASSAY THYROID STIM HORMONE: CPT

## 2022-03-21 PROCEDURE — 83036 HEMOGLOBIN GLYCOSYLATED A1C: CPT

## 2022-03-21 PROCEDURE — 80053 COMPREHEN METABOLIC PANEL: CPT

## 2022-03-21 PROCEDURE — 36415 COLL VENOUS BLD VENIPUNCTURE: CPT

## 2022-05-24 NOTE — ASSESSMENT & PLAN NOTE
· Meets SIRS criteria with tachypnia, tachycardia, and leukocytosis POA w/ likely source as COVID pneumonia   · Continue to monitor vitals Negative

## 2022-07-06 DIAGNOSIS — N48.1 BALANITIS: ICD-10-CM

## 2022-07-06 NOTE — TELEPHONE ENCOUNTER
Medication Refill Request     Name Nystatin Cream  Dose/Frequency QID PRN  Quantity 30 g   Verified pharmacy   [x]  Verified ordering Provider   [x]  Verified enough for 3 days  [] (1 day)

## 2022-07-23 ENCOUNTER — APPOINTMENT (OUTPATIENT)
Dept: LAB | Facility: CLINIC | Age: 67
End: 2022-07-23
Payer: MEDICARE

## 2022-07-23 DIAGNOSIS — E78.5 HYPERLIPIDEMIA, UNSPECIFIED HYPERLIPIDEMIA TYPE: ICD-10-CM

## 2022-07-23 DIAGNOSIS — E11.69 DIABETES MELLITUS ASSOCIATED WITH HORMONAL ETIOLOGY (HCC): ICD-10-CM

## 2022-07-23 DIAGNOSIS — Z12.5 SPECIAL SCREENING FOR MALIGNANT NEOPLASM OF PROSTATE: ICD-10-CM

## 2022-07-23 DIAGNOSIS — I10 ESSENTIAL HYPERTENSION, MALIGNANT: ICD-10-CM

## 2022-07-23 DIAGNOSIS — F52.21 ERECTILE DYSFUNCTION OF NONORGANIC ORIGIN: ICD-10-CM

## 2022-07-23 DIAGNOSIS — E55.9 AVITAMINOSIS D: ICD-10-CM

## 2022-07-23 LAB
25(OH)D3 SERPL-MCNC: 28.4 NG/ML (ref 30–100)
ALBUMIN SERPL BCP-MCNC: 4.3 G/DL (ref 3.5–5)
ALP SERPL-CCNC: 56 U/L (ref 34–104)
ALT SERPL W P-5'-P-CCNC: 13 U/L (ref 7–52)
ANION GAP SERPL CALCULATED.3IONS-SCNC: 5 MMOL/L (ref 4–13)
AST SERPL W P-5'-P-CCNC: 14 U/L (ref 13–39)
BASOPHILS # BLD AUTO: 0.05 THOUSANDS/ΜL (ref 0–0.1)
BASOPHILS NFR BLD AUTO: 1 % (ref 0–1)
BILIRUB SERPL-MCNC: 0.52 MG/DL (ref 0.2–1)
BUN SERPL-MCNC: 11 MG/DL (ref 5–25)
CALCIUM SERPL-MCNC: 9.3 MG/DL (ref 8.4–10.2)
CHLORIDE SERPL-SCNC: 106 MMOL/L (ref 96–108)
CHOLEST SERPL-MCNC: 115 MG/DL
CO2 SERPL-SCNC: 27 MMOL/L (ref 21–32)
CREAT SERPL-MCNC: 0.71 MG/DL (ref 0.6–1.3)
EOSINOPHIL # BLD AUTO: 0.36 THOUSAND/ΜL (ref 0–0.61)
EOSINOPHIL NFR BLD AUTO: 5 % (ref 0–6)
ERYTHROCYTE [DISTWIDTH] IN BLOOD BY AUTOMATED COUNT: 14.6 % (ref 11.6–15.1)
EST. AVERAGE GLUCOSE BLD GHB EST-MCNC: 126 MG/DL
FSH SERPL-ACNC: 1.2 MIU/ML (ref 0.7–10.8)
GFR SERPL CREATININE-BSD FRML MDRD: 96 ML/MIN/1.73SQ M
GLUCOSE P FAST SERPL-MCNC: 89 MG/DL (ref 65–99)
HBA1C MFR BLD: 6 %
HCT VFR BLD AUTO: 44.1 % (ref 36.5–49.3)
HDLC SERPL-MCNC: 38 MG/DL
HGB BLD-MCNC: 15 G/DL (ref 12–17)
IMM GRANULOCYTES # BLD AUTO: 0.03 THOUSAND/UL (ref 0–0.2)
IMM GRANULOCYTES NFR BLD AUTO: 0 % (ref 0–2)
LDLC SERPL CALC-MCNC: 51 MG/DL (ref 0–100)
LH SERPL-ACNC: 3.3 MIU/ML (ref 1.2–10.6)
LYMPHOCYTES # BLD AUTO: 1.54 THOUSANDS/ΜL (ref 0.6–4.47)
LYMPHOCYTES NFR BLD AUTO: 20 % (ref 14–44)
MCH RBC QN AUTO: 32 PG (ref 26.8–34.3)
MCHC RBC AUTO-ENTMCNC: 34 G/DL (ref 31.4–37.4)
MCV RBC AUTO: 94 FL (ref 82–98)
MONOCYTES # BLD AUTO: 0.59 THOUSAND/ΜL (ref 0.17–1.22)
MONOCYTES NFR BLD AUTO: 8 % (ref 4–12)
NEUTROPHILS # BLD AUTO: 5.31 THOUSANDS/ΜL (ref 1.85–7.62)
NEUTS SEG NFR BLD AUTO: 66 % (ref 43–75)
NONHDLC SERPL-MCNC: 77 MG/DL
NRBC BLD AUTO-RTO: 0 /100 WBCS
PLATELET # BLD AUTO: 258 THOUSANDS/UL (ref 149–390)
PMV BLD AUTO: 10.8 FL (ref 8.9–12.7)
POTASSIUM SERPL-SCNC: 4.5 MMOL/L (ref 3.5–5.3)
PROLACTIN SERPL-MCNC: 10.8 NG/ML (ref 2.5–17.4)
PROT SERPL-MCNC: 7.1 G/DL (ref 6.4–8.4)
RBC # BLD AUTO: 4.69 MILLION/UL (ref 3.88–5.62)
SODIUM SERPL-SCNC: 138 MMOL/L (ref 135–147)
TRIGL SERPL-MCNC: 128 MG/DL
TSH SERPL DL<=0.05 MIU/L-ACNC: 1.44 UIU/ML (ref 0.45–4.5)
WBC # BLD AUTO: 7.88 THOUSAND/UL (ref 4.31–10.16)

## 2022-07-23 PROCEDURE — 84146 ASSAY OF PROLACTIN: CPT

## 2022-07-23 PROCEDURE — 84402 ASSAY OF FREE TESTOSTERONE: CPT

## 2022-07-23 PROCEDURE — 36415 COLL VENOUS BLD VENIPUNCTURE: CPT

## 2022-07-23 PROCEDURE — 84403 ASSAY OF TOTAL TESTOSTERONE: CPT

## 2022-07-23 PROCEDURE — 82306 VITAMIN D 25 HYDROXY: CPT

## 2022-07-23 PROCEDURE — 83001 ASSAY OF GONADOTROPIN (FSH): CPT

## 2022-07-23 PROCEDURE — 83002 ASSAY OF GONADOTROPIN (LH): CPT

## 2022-07-23 PROCEDURE — 85025 COMPLETE CBC W/AUTO DIFF WBC: CPT

## 2022-07-23 PROCEDURE — 83036 HEMOGLOBIN GLYCOSYLATED A1C: CPT

## 2022-07-23 PROCEDURE — 80061 LIPID PANEL: CPT

## 2022-07-23 PROCEDURE — 84153 ASSAY OF PSA TOTAL: CPT

## 2022-07-23 PROCEDURE — 80053 COMPREHEN METABOLIC PANEL: CPT

## 2022-07-23 PROCEDURE — 84443 ASSAY THYROID STIM HORMONE: CPT

## 2022-07-24 LAB — PSA SERPL DL<=0.01 NG/ML-MCNC: 1.77 NG/ML (ref 0–4)

## 2022-07-25 LAB
TESTOST FREE SERPL-MCNC: 7.1 PG/ML (ref 6.6–18.1)
TESTOST SERPL-MCNC: 277 NG/DL (ref 264–916)

## 2022-07-29 DIAGNOSIS — N48.1 BALANITIS: ICD-10-CM

## 2022-10-12 PROBLEM — J12.82 PNEUMONIA DUE TO COVID-19 VIRUS: Status: RESOLVED | Noted: 2021-01-22 | Resolved: 2022-10-12

## 2022-10-12 PROBLEM — A41.9 SEPSIS (HCC): Status: RESOLVED | Noted: 2021-01-22 | Resolved: 2022-10-12

## 2022-10-12 PROBLEM — U07.1 PNEUMONIA DUE TO COVID-19 VIRUS: Status: RESOLVED | Noted: 2021-01-22 | Resolved: 2022-10-12

## 2022-10-22 ENCOUNTER — APPOINTMENT (OUTPATIENT)
Dept: LAB | Facility: CLINIC | Age: 67
End: 2022-10-22
Payer: MEDICARE

## 2022-10-22 DIAGNOSIS — R79.89 LOW TESTOSTERONE IN MALE: ICD-10-CM

## 2022-10-22 DIAGNOSIS — E11.69 TYPE 2 DIABETES MELLITUS WITH OTHER SPECIFIED COMPLICATION, WITH LONG-TERM CURRENT USE OF INSULIN (HCC): ICD-10-CM

## 2022-10-22 DIAGNOSIS — Z79.4 TYPE 2 DIABETES MELLITUS WITH OTHER SPECIFIED COMPLICATION, WITH LONG-TERM CURRENT USE OF INSULIN (HCC): ICD-10-CM

## 2022-10-22 LAB
ALBUMIN SERPL BCP-MCNC: 4.2 G/DL (ref 3.5–5)
ALP SERPL-CCNC: 52 U/L (ref 34–104)
ALT SERPL W P-5'-P-CCNC: 21 U/L (ref 7–52)
ANION GAP SERPL CALCULATED.3IONS-SCNC: 4 MMOL/L (ref 4–13)
AST SERPL W P-5'-P-CCNC: 19 U/L (ref 13–39)
BILIRUB SERPL-MCNC: 0.44 MG/DL (ref 0.2–1)
BUN SERPL-MCNC: 12 MG/DL (ref 5–25)
CALCIUM SERPL-MCNC: 9 MG/DL (ref 8.4–10.2)
CHLORIDE SERPL-SCNC: 103 MMOL/L (ref 96–108)
CO2 SERPL-SCNC: 28 MMOL/L (ref 21–32)
CREAT SERPL-MCNC: 0.67 MG/DL (ref 0.6–1.3)
EST. AVERAGE GLUCOSE BLD GHB EST-MCNC: 120 MG/DL
GFR SERPL CREATININE-BSD FRML MDRD: 98 ML/MIN/1.73SQ M
GLUCOSE P FAST SERPL-MCNC: 102 MG/DL (ref 65–99)
HBA1C MFR BLD: 5.8 %
POTASSIUM SERPL-SCNC: 3.8 MMOL/L (ref 3.5–5.3)
PROT SERPL-MCNC: 7 G/DL (ref 6.4–8.4)
SODIUM SERPL-SCNC: 135 MMOL/L (ref 135–147)
TESTOST SERPL-MCNC: 321 NG/DL (ref 95–948)

## 2022-10-22 PROCEDURE — 36415 COLL VENOUS BLD VENIPUNCTURE: CPT

## 2022-10-22 PROCEDURE — 83036 HEMOGLOBIN GLYCOSYLATED A1C: CPT

## 2022-10-22 PROCEDURE — 80053 COMPREHEN METABOLIC PANEL: CPT

## 2022-10-22 PROCEDURE — 84403 ASSAY OF TOTAL TESTOSTERONE: CPT

## 2022-12-19 DIAGNOSIS — N48.1 BALANITIS: ICD-10-CM

## 2022-12-19 NOTE — TELEPHONE ENCOUNTER
Medication Refill Request     Name nystatin-triamcinolone  Dose/Frequency PRN  Quantity: 30 day   Verified pharmacy   [x]  Verified ordering Provider   [x]  Does patient have enough for the next 3 days?  Yes [] No [x]

## 2022-12-23 ENCOUNTER — APPOINTMENT (EMERGENCY)
Dept: RADIOLOGY | Facility: HOSPITAL | Age: 67
End: 2022-12-23

## 2022-12-23 ENCOUNTER — HOSPITAL ENCOUNTER (EMERGENCY)
Facility: HOSPITAL | Age: 67
Discharge: HOME | End: 2022-12-23
Attending: EMERGENCY MEDICINE

## 2022-12-23 VITALS
TEMPERATURE: 98.7 F | HEART RATE: 93 BPM | SYSTOLIC BLOOD PRESSURE: 151 MMHG | RESPIRATION RATE: 16 BRPM | OXYGEN SATURATION: 99 % | DIASTOLIC BLOOD PRESSURE: 70 MMHG

## 2022-12-23 DIAGNOSIS — J10.1 INFLUENZA A: Primary | ICD-10-CM

## 2022-12-23 DIAGNOSIS — N48.1 BALANITIS: ICD-10-CM

## 2022-12-23 LAB
2HR DELTA HS TROPONIN: 1 NG/L
ALBUMIN SERPL BCP-MCNC: 4.6 G/DL (ref 3.5–5)
ALP SERPL-CCNC: 77 U/L (ref 34–104)
ALT SERPL W P-5'-P-CCNC: 27 U/L (ref 7–52)
ANION GAP SERPL CALCULATED.3IONS-SCNC: 6 MMOL/L (ref 4–13)
APTT PPP: 44 SECONDS (ref 23–37)
AST SERPL W P-5'-P-CCNC: 25 U/L (ref 13–39)
BACTERIA UR QL AUTO: ABNORMAL /HPF
BASOPHILS # BLD AUTO: 0.05 THOUSANDS/ÂΜL (ref 0–0.1)
BASOPHILS NFR BLD AUTO: 1 % (ref 0–1)
BILIRUB SERPL-MCNC: 0.4 MG/DL (ref 0.2–1)
BILIRUB UR QL STRIP: NEGATIVE
BUN SERPL-MCNC: 11 MG/DL (ref 5–25)
CALCIUM SERPL-MCNC: 9.8 MG/DL (ref 8.4–10.2)
CARDIAC TROPONIN I PNL SERPL HS: 2 NG/L
CARDIAC TROPONIN I PNL SERPL HS: 3 NG/L
CHLORIDE SERPL-SCNC: 103 MMOL/L (ref 96–108)
CLARITY UR: CLEAR
CO2 SERPL-SCNC: 28 MMOL/L (ref 21–32)
COLOR UR: ABNORMAL
CREAT SERPL-MCNC: 0.77 MG/DL (ref 0.6–1.3)
EOSINOPHIL # BLD AUTO: 0.4 THOUSAND/ÂΜL (ref 0–0.61)
EOSINOPHIL NFR BLD AUTO: 5 % (ref 0–6)
ERYTHROCYTE [DISTWIDTH] IN BLOOD BY AUTOMATED COUNT: 14.1 % (ref 11.6–15.1)
FLUAV RNA RESP QL NAA+PROBE: POSITIVE
FLUBV RNA RESP QL NAA+PROBE: NEGATIVE
GFR SERPL CREATININE-BSD FRML MDRD: 93 ML/MIN/1.73SQ M
GLUCOSE SERPL-MCNC: 92 MG/DL (ref 65–140)
GLUCOSE UR STRIP-MCNC: NEGATIVE MG/DL
HCT VFR BLD AUTO: 48.1 % (ref 36.5–49.3)
HGB BLD-MCNC: 16 G/DL (ref 12–17)
HGB UR QL STRIP.AUTO: NEGATIVE
IMM GRANULOCYTES # BLD AUTO: 0.03 THOUSAND/UL (ref 0–0.2)
IMM GRANULOCYTES NFR BLD AUTO: 0 % (ref 0–2)
INR PPP: 0.88 (ref 0.84–1.19)
KETONES UR STRIP-MCNC: NEGATIVE MG/DL
LACTATE SERPL-SCNC: 0.5 MMOL/L (ref 0.5–2)
LEUKOCYTE ESTERASE UR QL STRIP: NEGATIVE
LYMPHOCYTES # BLD AUTO: 0.95 THOUSANDS/ÂΜL (ref 0.6–4.47)
LYMPHOCYTES NFR BLD AUTO: 11 % (ref 14–44)
MAGNESIUM SERPL-MCNC: 1.9 MG/DL (ref 1.9–2.7)
MCH RBC QN AUTO: 30.9 PG (ref 26.8–34.3)
MCHC RBC AUTO-ENTMCNC: 33.3 G/DL (ref 31.4–37.4)
MCV RBC AUTO: 93 FL (ref 82–98)
MONOCYTES # BLD AUTO: 0.72 THOUSAND/ÂΜL (ref 0.17–1.22)
MONOCYTES NFR BLD AUTO: 8 % (ref 4–12)
MUCOUS THREADS UR QL AUTO: ABNORMAL
NEUTROPHILS # BLD AUTO: 6.8 THOUSANDS/ÂΜL (ref 1.85–7.62)
NEUTS SEG NFR BLD AUTO: 75 % (ref 43–75)
NITRITE UR QL STRIP: NEGATIVE
NON-SQ EPI CELLS URNS QL MICRO: ABNORMAL /HPF
NRBC BLD AUTO-RTO: 0 /100 WBCS
PH UR STRIP.AUTO: 5.5 [PH]
PLATELET # BLD AUTO: 257 THOUSANDS/UL (ref 149–390)
PMV BLD AUTO: 10.2 FL (ref 8.9–12.7)
POTASSIUM SERPL-SCNC: 4 MMOL/L (ref 3.5–5.3)
PROT SERPL-MCNC: 8.3 G/DL (ref 6.4–8.4)
PROT UR STRIP-MCNC: ABNORMAL MG/DL
PROTHROMBIN TIME: 12.2 SECONDS (ref 11.6–14.5)
RBC # BLD AUTO: 5.17 MILLION/UL (ref 3.88–5.62)
RBC #/AREA URNS AUTO: ABNORMAL /HPF
RSV RNA RESP QL NAA+PROBE: NEGATIVE
SARS-COV-2 RNA RESP QL NAA+PROBE: NEGATIVE
SODIUM SERPL-SCNC: 137 MMOL/L (ref 135–147)
SP GR UR STRIP.AUTO: 1.03 (ref 1–1.03)
TSH SERPL DL<=0.05 MIU/L-ACNC: 1.93 UIU/ML (ref 0.45–4.5)
UROBILINOGEN UR STRIP-ACNC: <2 MG/DL
WBC # BLD AUTO: 8.95 THOUSAND/UL (ref 4.31–10.16)
WBC #/AREA URNS AUTO: ABNORMAL /HPF

## 2022-12-23 RX ORDER — OSELTAMIVIR PHOSPHATE 75 MG/1
75 CAPSULE ORAL EVERY 12 HOURS
Qty: 10 CAPSULE | Refills: 0 | Status: SHIPPED | OUTPATIENT
Start: 2022-12-23 | End: 2022-12-28

## 2022-12-23 RX ORDER — IBUPROFEN 400 MG/1
400 TABLET ORAL ONCE
Status: COMPLETED | OUTPATIENT
Start: 2022-12-23 | End: 2022-12-23

## 2022-12-23 RX ADMIN — IBUPROFEN 400 MG: 400 TABLET ORAL at 13:18

## 2022-12-23 RX ADMIN — SODIUM CHLORIDE 2000 ML: 0.9 INJECTION, SOLUTION INTRAVENOUS at 11:33

## 2022-12-23 NOTE — ED PROVIDER NOTES
History  Chief Complaint   Patient presents with   • Shortness of Breath   • Sore Throat     Patient presents to the emergency room with a 2-day history of mild viral-like symptoms  He complains of a sore throat that is worse with swallowing  He states it is constant  He has had a decreased appetite secondary to the fact that it hurts when he swallows  He complains of nasal congestion and postnasal drip  He complains of a productive cough with white sputum  He does complain of chest tightness with coughing  It is relieved with rest   He has taken acetaminophen for his sore throat with no relief  He complains of a subjective fever for 1 hour that was relieved with Tylenol  He denies any nausea or vomiting or diarrhea  He denies any diaphoresis  He complains of shortness of breath with activity  Patient denies any abdominal pain  He denies any black tarry stools or bright red blood per rectum  He denies any dysuria or frequency urgency or hematuria  He has a past medical history that is positive for hypertension and hypercholesterolemia  He also has had a history of a stroke  His differential diagnosis includes but is not limited to influenza, COVID, pneumonia, viral syndrome, myocarditis, pericarditis, GERD, esophagitis, gastritis, pulmonary embolism, aortic dissection        History provided by:  Patient  Shortness of Breath  Severity:  Mild  Onset quality:  Gradual  Duration:  2 days  Timing:  Intermittent  Chronicity:  New  Context: activity and URI    Context: not animal exposure, not emotional upset, not fumes, not known allergens, not occupational exposure, not pollens, not smoke exposure, not strong odors and not weather changes    Relieved by:  Rest  Associated symptoms: chest pain, cough, fever and sore throat    Associated symptoms: no abdominal pain, no claudication, no diaphoresis, no ear pain, no headaches, no hemoptysis, no neck pain, no PND, no rash, no sputum production, no syncope, no swollen glands, no vomiting and no wheezing    Chest pain:     Quality: tightness      Severity:  Mild    Onset quality:  Gradual    Chronicity:  New  Cough:     Cough characteristics:  Dry    Sputum characteristics:  Nondescript    Severity:  Moderate    Timing:  Intermittent    Chronicity:  New  Fever:     Duration:  1 hour    Timing:  Intermittent    Temp source:  Subjective    Progression:  Resolved  Risk factors: obesity    Risk factors: no recent alcohol use, no family hx of DVT, no hx of cancer, no hx of PE/DVT, no prolonged immobilization, no recent surgery and no tobacco use    Sore Throat  Location:  Generalized  Severity:  Moderate  Onset quality:  Gradual  Timing:  Intermittent  Progression:  Waxing and waning  Chronicity:  New  Relieved by:  Acetaminophen  Worsened by:  Nothing  Associated symptoms: chest pain, cough, fever, postnasal drip, rhinorrhea and shortness of breath    Associated symptoms: no abdominal pain, no chills, no ear discharge, no ear pain, no eye discharge, no headaches and no rash    Chest pain:     Quality: tightness      Quality: not aching, not burning, not crushing, not dull, not hot, no pressure, not radiating, not sharp, not stabbing, not tearing and not throbbing        Prior to Admission Medications   Prescriptions Last Dose Informant Patient Reported? Taking? Blood Glucose Monitoring Suppl (OneTouch Verio Reflect) w/Device KIT   No No   Sig: Use 1 kit 2 (two) times a day Please check sugars twice a day   Blood Pressure KIT   No No   Sig: Use 2 (two) times a day   aspirin (ECOTRIN LOW STRENGTH) 81 mg EC tablet   No No   Sig: Take 1 tablet (81 mg total) by mouth daily   atorvastatin (LIPITOR) 40 mg tablet   No No   Sig: Take 1 tablet (40 mg total) by mouth daily at bedtime   glucose blood (OneTouch Verio) test strip   No No   Sig: Check blood glucoses up to once per day and keep a log to bring to your appointment     lisinopril (ZESTRIL) 10 mg tablet   No No   Sig: TAKE 1 TABLET BY MOUTH EVERY DAY   metFORMIN (GLUCOPHAGE) 500 mg tablet   No No   Sig: Take 2 tablets (1,000 mg total) by mouth 2 (two) times a day with meals   sitaGLIPtin (JANUVIA) 100 mg tablet   No No   Sig: Take 1 tablet (100 mg total) by mouth daily      Facility-Administered Medications: None       Past Medical History:   Diagnosis Date   • Stroke [facility])        History reviewed  No pertinent surgical history  Family History   Problem Relation Age of Onset   • Colon cancer Mother    • Hyperlipidemia Father    • Parkinsonism Father    • No Known Problems Son    • No Known Problems Daughter    • No Known Problems Daughter    • No Known Problems Daughter      I have reviewed and agree with the history as documented  E-Cigarette/Vaping   • E-Cigarette Use Never User      E-Cigarette/Vaping Substances   • Nicotine No    • THC No    • CBD No    • Flavoring No    • Other No    • Unknown No      Social History     Tobacco Use   • Smoking status: Former     Types: Cigarettes   • Smokeless tobacco: Never   Vaping Use   • Vaping Use: Never used   Substance Use Topics   • Alcohol use: Never     Comment: social   • Drug use: No       Review of Systems   Constitutional: Positive for activity change, fatigue and fever  Negative for appetite change, chills and diaphoresis  HENT: Positive for congestion, postnasal drip, rhinorrhea and sore throat  Negative for dental problem, ear discharge and ear pain  Eyes: Negative for pain, discharge, redness and itching  Respiratory: Positive for cough, chest tightness and shortness of breath  Negative for hemoptysis, sputum production and wheezing  Cardiovascular: Positive for chest pain  Negative for claudication, syncope and PND  Gastrointestinal: Negative for abdominal pain and vomiting  Genitourinary: Negative for dysuria, frequency, hematuria and urgency  Musculoskeletal: Negative for gait problem and neck pain  Skin: Negative for color change and rash  Neurological: Negative for headaches  Psychiatric/Behavioral: Negative for confusion  All other systems reviewed and are negative  Physical Exam  Physical Exam  Vitals and nursing note reviewed  Constitutional:       General: He is not in acute distress  Appearance: He is well-developed and normal weight  He is not ill-appearing, toxic-appearing or diaphoretic  HENT:      Head: Normocephalic  Cardiovascular:      Rate and Rhythm: Normal rate and regular rhythm  Heart sounds: No murmur heard  No gallop  Pulmonary:      Effort: Pulmonary effort is normal       Breath sounds: Normal breath sounds  Abdominal:      Palpations: Abdomen is soft  There is no hepatomegaly or splenomegaly  Musculoskeletal:      Cervical back: Neck supple  Right lower leg: No edema  Left lower leg: No edema  Skin:     General: Skin is warm  Capillary Refill: Capillary refill takes less than 2 seconds  Neurological:      Mental Status: He is alert and oriented to person, place, and time     Psychiatric:         Mood and Affect: Mood normal          Behavior: Behavior normal          Vital Signs  ED Triage Vitals   Temperature Pulse Respirations Blood Pressure SpO2   12/23/22 1032 12/23/22 1032 12/23/22 1032 12/23/22 1032 12/23/22 1032   98 7 °F (37 1 °C) (!) 108 18 (!) 179/87 97 %      Temp Source Heart Rate Source Patient Position - Orthostatic VS BP Location FiO2 (%)   12/23/22 1032 12/23/22 1032 12/23/22 1032 12/23/22 1032 --   Oral Monitor Sitting Right arm       Pain Score       12/23/22 1318       10 - Worst Possible Pain           Vitals:    12/23/22 1032 12/23/22 1230 12/23/22 1300   BP: (!) 179/87 162/76 151/70   Pulse: (!) 108 89 93   Patient Position - Orthostatic VS: Sitting Lying Lying         Visual Acuity      ED Medications  Medications   sodium chloride 0 9 % bolus 2,000 mL (0 mL Intravenous Stopped 12/23/22 1258)   ibuprofen (MOTRIN) tablet 400 mg (400 mg Oral Given 12/23/22 1318)       Diagnostic Studies  Results Reviewed     Procedure Component Value Units Date/Time    Blood culture #1 [[id number]] Collected: 12/23/22 1128    Lab Status: Preliminary result Specimen: Blood from Arm, Right Updated: 12/23/22 1701     Blood Culture Received in Microbiology Lab  Culture in Progress  Blood culture #2 [[id number]] Collected: 12/23/22 1128    Lab Status: Preliminary result Specimen: Blood from Arm, Left Updated: 12/23/22 1701     Blood Culture Received in Microbiology Lab  Culture in Progress  HS Troponin I 2hr [[id number]]  (Normal) Collected: 12/23/22 1326    Lab Status: Final result Specimen: Blood from Arm, Left Updated: 12/23/22 1416     hs TnI 2hr 3 ng/L      Delta 2hr hsTnI 1 ng/L     TSH [[id number]]  (Normal) Collected: 12/23/22 1128    Lab Status: Final result Specimen: Blood from Arm, Right Updated: 12/23/22 1226     TSH 3RD GENERATON 1 928 uIU/mL     Narrative:      Patients undergoing fluorescein dye angiography may retain small amounts of fluorescein in the body for 48-72 hours post procedure  Samples containing fluorescein can produce falsely depressed TSH values  If the patient had this procedure,a specimen should be resubmitted post fluorescein clearance        Urine Microscopic [[id number]]  (Abnormal) Collected: 12/23/22 1127    Lab Status: Final result Specimen: Urine, Clean Catch Updated: 12/23/22 1223     RBC, UA 1-2 /hpf      WBC, UA 1-2 /hpf      Epithelial Cells Occasional /hpf      Bacteria, UA Occasional /hpf      MUCUS THREADS Occasional    FLU/RSV/COVID - if FLU/RSV clinically relevant [[id number]]  (Abnormal) Collected: 12/23/22 1128    Lab Status: Final result Specimen: Nares from Nose Updated: 12/23/22 1218     SARS-CoV-2 Negative     INFLUENZA A PCR Positive     INFLUENZA B PCR Negative     RSV PCR Negative    Narrative:      FOR PEDIATRIC PATIENTS - copy/paste COVID Guidelines URL to browser: [email or web address]    SARS-CoV-2 assay is a Nucleic Acid Amplification assay intended for the  qualitative detection of nucleic acid from SARS-CoV-2 in nasopharyngeal  swabs  Results are for the presumptive identification of SARS-CoV-2 RNA  Positive results are indicative of infection with SARS-CoV-2, the virus  causing COVID-19, but do not rule out bacterial infection or co-infection  with other viruses  Laboratories within the [place] and its  territories are required to report all positive results to the appropriate  public health authorities  Negative results do not preclude SARS-CoV-2  infection and should not be used as the sole basis for treatment or other  patient management decisions  Negative results must be combined with  clinical observations, patient history, and epidemiological information  This test has not been FDA cleared or approved  This test has been authorized by FDA under an Emergency Use Authorization  (EUA)  This test is only authorized for the duration of time the  declaration that circumstances exist justifying the authorization of the  emergency use of an in vitro diagnostic tests for detection of SARS-CoV-2  virus and/or diagnosis of COVID-19 infection under section 564(b)(1) of  the Act, 21 U  S C  297CBI-6(I)(1), unless the authorization is terminated  or revoked sooner  The test has been validated but independent review by FDA  and CLIA is pending  Test performed using [employer] (Proprietary) GeneXpert: This RT-PCR assay targets N2,  a region unique to SARS-CoV-2  A conserved region in the E-gene was chosen  for pan-Sarbecovirus detection which includes SARS-CoV-2  According to CMS-2020-01-R, this platform meets the definition of high-throughput technology      HS Troponin 0hr (reflex protocol) [[id number]]  (Normal) Collected: 12/23/22 1128    Lab Status: Final result Specimen: Blood from Arm, Right Updated: 12/23/22 1217     hs TnI 0hr 2 ng/L     Comprehensive metabolic panel [[id number]] Collected: 12/23/22 1128    Lab Status: Final result Specimen: Blood from Arm, Right Updated: 12/23/22 1207     Sodium 137 mmol/L      Potassium 4 0 mmol/L      Chloride 103 mmol/L      CO2 28 mmol/L      ANION GAP 6 mmol/L      BUN 11 mg/dL      Creatinine 0 77 mg/dL      Glucose 92 mg/dL      Calcium 9 8 mg/dL      AST 25 U/L      ALT 27 U/L      Alkaline Phosphatase 77 U/L      Total Protein 8 3 g/dL      Albumin 4 6 g/dL      Total Bilirubin 0 40 mg/dL      eGFR 93 ml/min/1 73sq m     Narrative:      [place] guidelines for Chronic Kidney Disease (CKD):   •  Stage 1 with normal or high GFR (GFR > 90 mL/min/1 73 square meters)  •  Stage 2 Mild CKD (GFR = 60-89 mL/min/1 73 square meters)  •  Stage 3A Moderate CKD (GFR = 45-59 mL/min/1 73 square meters)  •  Stage 3B Moderate CKD (GFR = 30-44 mL/min/1 73 square meters)  •  Stage 4 Severe CKD (GFR = 15-29 mL/min/1 73 square meters)  •  Stage 5 End Stage CKD (GFR <15 mL/min/1 73 square meters)  Note: GFR calculation is accurate only with a steady state creatinine    Magnesium [[id number]]  (Normal) Collected: 12/23/22 1128    Lab Status: Final result Specimen: Blood from Arm, Right Updated: 12/23/22 1207     Magnesium 1 9 mg/dL     Lactic acid [[id number]]  (Normal) Collected: 12/23/22 1128    Lab Status: Final result Specimen: Blood from Arm, Right Updated: 12/23/22 1205     LACTIC ACID 0 5 mmol/L     Narrative:      Result may be elevated if tourniquet was used during collection      Ivania Jami [[id number]]  (Normal) Collected: 12/23/22 1128    Lab Status: Final result Specimen: Blood from Arm, Right Updated: 12/23/22 1205     Protime 12 2 seconds      INR 0 88    APTT [[id number]]  (Abnormal) Collected: 12/23/22 1128    Lab Status: Final result Specimen: Blood from Arm, Right Updated: 12/23/22 1205     PTT 44 seconds     UA w Reflex to Microscopic w Reflex to Culture [[id number]] (Abnormal) Collected: 12/23/22 1127    Lab Status: Final result Specimen: Urine, Clean Catch Updated: 12/23/22 1200     Color, UA Light Yellow     Clarity, UA Clear     Specific Gravity, UA 1 027     pH, UA 5 5     Leukocytes, UA Negative     Nitrite, UA Negative     Protein, UA Trace mg/dl      Glucose, UA Negative mg/dl      Ketones, UA Negative mg/dl      Urobilinogen, UA <2 0 mg/dl      Bilirubin, UA Negative     Occult Blood, UA Negative    CBC and differential [[id number]]  (Abnormal) Collected: 12/23/22 1128    Lab Status: Final result Specimen: Blood from Arm, Right Updated: 12/23/22 1146     WBC 8 95 Thousand/uL      RBC 5 17 Million/uL      Hemoglobin 16 0 g/dL      Hematocrit 48 1 %      MCV 93 fL      MCH 30 9 pg      MCHC 33 3 g/dL      RDW 14 1 %      MPV 10 2 fL      Platelets 300 Thousands/uL      nRBC 0 /100 WBCs      Neutrophils Relative 75 %      Immat GRANS % 0 %      Lymphocytes Relative 11 %      Monocytes Relative 8 %      Eosinophils Relative 5 %      Basophils Relative 1 %      Neutrophils Absolute 6 80 Thousands/µL      Immature Grans Absolute 0 03 Thousand/uL      Lymphocytes Absolute 0 95 Thousands/µL      Monocytes Absolute 0 72 Thousand/µL      Eosinophils Absolute 0 40 Thousand/µL      Basophils Absolute 0 05 Thousands/µL                  XR chest 1 view portable   ED Interpretation by Sarkis Dozier PA-C (12/23 1346)   No acute cardiopulmonary disease      Final Result by Warden Inder MD (12/23 5189)      No acute cardiopulmonary disease  No pneumonia                 Workstation performed: [id number]                    Procedures  ECG 12 Lead Documentation Only    Date/Time: 12/23/2022 11:47 AM  Performed by: Sarkis Dozier PA-C  Authorized by: Sarkis Dozier PA-C     Indications / Diagnosis:  Chest tightness  ECG reviewed by me, the ED Provider: yes    Patient location:  ED  Previous ECG:     Previous ECG:  Compared to current    Comparison ECG info: 1/22/21    Similarity:  No change    Comparison to cardiac monitor: Yes    Interpretation:     Interpretation: normal    Rate:     ECG rate:  97    ECG rate assessment: normal    Rhythm:     Rhythm: sinus rhythm    Ectopy:     Ectopy: none    QRS:     QRS axis:  Normal    QRS intervals:  Normal  Conduction:     Conduction: normal    ST segments:     ST segments:  Normal  T waves:     T waves: normal    Comments:      No signs of acute ischemia    Independently interpreted by me             ED Course                               SBIRT 22yo+    Flowsheet Row Most Recent Value   SBIRT (25 yo +)    In order to provide better care to our patients, we are screening all of our patients for alcohol and drug use  Would it be okay to ask you these screening questions? Yes Filed at: 12/23/2022 1329   Initial Alcohol Screen: US AUDIT-C     1  How often do you have a drink containing alcohol? 0 Filed at: 12/23/2022 1329   2  How many drinks containing alcohol do you have on a typical day you are drinking? 0 Filed at: 12/23/2022 1329   3a  Male UNDER 65: How often do you have five or more drinks on one occasion? 0 Filed at: 12/23/2022 1329   3b  FEMALE Any Age, or MALE 65+: How often do you have 4 or more drinks on one occassion? 0 Filed at: 12/23/2022 1329   Audit-C Score 0 Filed at: 12/23/2022 1329   MIGDALIA: How many times in the past year have you    Used an illegal drug or used a prescription medication for non-medical reasons?  Never Filed at: 12/23/2022 1329                    MDM  Number of Diagnoses or Management Options  Influenza A: new and requires workup     Amount and/or Complexity of Data Reviewed  Clinical lab tests: reviewed and ordered  Tests in the medicine section of CPT®: ordered and reviewed    Risk of Complications, Morbidity, and/or Mortality  Presenting problems: high  Diagnostic procedures: high  Management options: high        Disposition  Final diagnoses:   Influenza A     Time reflects when diagnosis was documented in both MDM as applicable and the Disposition within this note     Time User Action Codes Description Comment    12/23/2022  1:47 PM Ceasar Miller Add [J10 1] Influenza A       ED Disposition     ED Disposition   Discharge    Condition   Stable    Date/Time   Fri Dec 23, 2022  1:47 PM    Comment   Wybasia Mirna discharge to home/self care                 Follow-up Information     Follow up With Specialties Details Why Contact Info    your Physician              Discharge Medication List as of 12/23/2022  1:50 PM      START taking these medications    Details   oseltamivir (TAMIFLU) 75 mg capsule Take 1 capsule (75 mg total) by mouth every 12 (twelve) hours for 5 days, Starting Fri 12/23/2022, Until Wed 12/28/2022, Normal         CONTINUE these medications which have NOT CHANGED    Details   aspirin (ECOTRIN LOW STRENGTH) 81 mg EC tablet Take 1 tablet (81 mg total) by mouth daily, Starting Fri 2/5/2021, Normal      atorvastatin (LIPITOR) 40 mg tablet Take 1 tablet (40 mg total) by mouth daily at bedtime, Starting Fri 2/5/2021, Normal      Blood Glucose Monitoring Suppl (OneTouch Verio Reflect) w/Device KIT Use 1 kit 2 (two) times a day Please check sugars twice a day, Starting Mon 3/29/2021, Normal      Blood Pressure KIT Use 2 (two) times a day, Starting Thu 3/25/2021, Normal      glucose blood (OneTouch Verio) test strip Check blood glucoses up to once per day and keep a log to bring to your appointment , Normal      lisinopril (ZESTRIL) 10 mg tablet TAKE 1 TABLET BY MOUTH EVERY DAY, Normal      metFORMIN (GLUCOPHAGE) 500 mg tablet Take 2 tablets (1,000 mg total) by mouth 2 (two) times a day with meals, Starting Tue 1/26/2021, Until Fri 4/9/2021, Normal      sitaGLIPtin (JANUVIA) 100 mg tablet Take 1 tablet (100 mg total) by mouth daily, Starting Tue 6/8/2021, Sample      nystatin-triamcinolone (MYCOLOG-II) cream Apply topically if needed (as needed), Starting Mon 12/19/2022, Normal No discharge procedures on file      PDMP Review     None          ED Provider  Electronically Signed by           Juan Sequeira PA-C  12/23/22 1924

## 2022-12-23 NOTE — Clinical Note
Jessica Strong was seen and treated in our emergency department on 12/23/2022  No restrictions            Diagnosis:     Bishop Mesa  may return to work on return date  He may return on this date: 12/27/2022         If you have any questions or concerns, please don't hesitate to call        Vikki Linton PA-C    ______________________________           _______________          _______________  Hospital Representative                              Date                                Time

## 2022-12-23 NOTE — Clinical Note
Lacey Barbjordan was seen and treated in our emergency department on 12/23/2022  No restrictions            Diagnosis:     Leila Lawson  may return to work on return date  He may return on this date: 12/27/2022         If you have any questions or concerns, please don't hesitate to call        Vikki Kaur PA-C    ______________________________           _______________          _______________  Hospital Representative                              Date                                Time

## 2022-12-23 NOTE — Clinical Note
Lilliana Taylorbaltazarmark was seen and treated in our emergency department on 12/23/2022  No restrictions            Diagnosis:     Sarabjit Pittman  may return to work on return date  He may return on this date: 12/27/2022         If you have any questions or concerns, please don't hesitate to call        Juan Sequeira PA-C    ______________________________           _______________          _______________  Hospital Representative                              Date                                Time

## 2022-12-23 NOTE — Clinical Note
Wilder Boyer was seen and treated in our emergency department on 12/23/2022  No restrictions            Diagnosis:     Angela Lala  may return to work on return date  He may return on this date: 12/27/2022         If you have any questions or concerns, please don't hesitate to call        Gemma Soto PA-C    ______________________________           _______________          _______________  Hospital Representative                              Date                                Time

## 2022-12-24 LAB
ATRIAL RATE: 97 BPM
P AXIS: 14 DEGREES
PR INTERVAL: 154 MS
QRS AXIS: -13 DEGREES
QRSD INTERVAL: 90 MS
QT INTERVAL: 344 MS
QTC INTERVAL: 436 MS
T WAVE AXIS: 48 DEGREES
VENTRICULAR RATE: 97 BPM

## 2022-12-25 LAB
BACTERIA BLD CULT: NORMAL
BACTERIA BLD CULT: NORMAL

## 2022-12-28 LAB
BACTERIA BLD CULT: NORMAL
BACTERIA BLD CULT: NORMAL

## 2023-01-31 ENCOUNTER — APPOINTMENT (OUTPATIENT)
Dept: LAB | Facility: CLINIC | Age: 68
End: 2023-01-31

## 2023-01-31 DIAGNOSIS — R79.89 LOW TESTOSTERONE IN MALE: ICD-10-CM

## 2023-01-31 DIAGNOSIS — E78.2 MIXED HYPERLIPIDEMIA: ICD-10-CM

## 2023-01-31 DIAGNOSIS — E55.9 AVITAMINOSIS D: ICD-10-CM

## 2023-01-31 DIAGNOSIS — E11.69 DIABETES MELLITUS ASSOCIATED WITH HORMONAL ETIOLOGY (HCC): ICD-10-CM

## 2023-01-31 DIAGNOSIS — I10 ESSENTIAL HYPERTENSION, MALIGNANT: ICD-10-CM

## 2023-01-31 LAB
25(OH)D3 SERPL-MCNC: 38.6 NG/ML (ref 30–100)
ALBUMIN SERPL BCP-MCNC: 4.1 G/DL (ref 3.5–5)
ALP SERPL-CCNC: 51 U/L (ref 34–104)
ALT SERPL W P-5'-P-CCNC: 15 U/L (ref 7–52)
ANION GAP SERPL CALCULATED.3IONS-SCNC: 3 MMOL/L (ref 4–13)
AST SERPL W P-5'-P-CCNC: 14 U/L (ref 13–39)
BASOPHILS # BLD AUTO: 0.05 THOUSANDS/ÂΜL (ref 0–0.1)
BASOPHILS NFR BLD AUTO: 1 % (ref 0–1)
BILIRUB SERPL-MCNC: 0.47 MG/DL (ref 0.2–1)
BUN SERPL-MCNC: 12 MG/DL (ref 5–25)
CALCIUM SERPL-MCNC: 8.9 MG/DL (ref 8.4–10.2)
CHLORIDE SERPL-SCNC: 105 MMOL/L (ref 96–108)
CHOLEST SERPL-MCNC: 141 MG/DL
CO2 SERPL-SCNC: 27 MMOL/L (ref 21–32)
CREAT SERPL-MCNC: 0.71 MG/DL (ref 0.6–1.3)
EOSINOPHIL # BLD AUTO: 0.35 THOUSAND/ÂΜL (ref 0–0.61)
EOSINOPHIL NFR BLD AUTO: 5 % (ref 0–6)
ERYTHROCYTE [DISTWIDTH] IN BLOOD BY AUTOMATED COUNT: 14.2 % (ref 11.6–15.1)
EST. AVERAGE GLUCOSE BLD GHB EST-MCNC: 123 MG/DL
GFR SERPL CREATININE-BSD FRML MDRD: 96 ML/MIN/1.73SQ M
GLUCOSE P FAST SERPL-MCNC: 103 MG/DL (ref 65–99)
HBA1C MFR BLD: 5.9 %
HCT VFR BLD AUTO: 42.9 % (ref 36.5–49.3)
HDLC SERPL-MCNC: 35 MG/DL
HGB BLD-MCNC: 14.3 G/DL (ref 12–17)
IMM GRANULOCYTES # BLD AUTO: 0.03 THOUSAND/UL (ref 0–0.2)
IMM GRANULOCYTES NFR BLD AUTO: 0 % (ref 0–2)
LDLC SERPL CALC-MCNC: 56 MG/DL (ref 0–100)
LYMPHOCYTES # BLD AUTO: 1.73 THOUSANDS/ÂΜL (ref 0.6–4.47)
LYMPHOCYTES NFR BLD AUTO: 25 % (ref 14–44)
MCH RBC QN AUTO: 31.6 PG (ref 26.8–34.3)
MCHC RBC AUTO-ENTMCNC: 33.3 G/DL (ref 31.4–37.4)
MCV RBC AUTO: 95 FL (ref 82–98)
MONOCYTES # BLD AUTO: 0.49 THOUSAND/ÂΜL (ref 0.17–1.22)
MONOCYTES NFR BLD AUTO: 7 % (ref 4–12)
NEUTROPHILS # BLD AUTO: 4.21 THOUSANDS/ÂΜL (ref 1.85–7.62)
NEUTS SEG NFR BLD AUTO: 62 % (ref 43–75)
NONHDLC SERPL-MCNC: 106 MG/DL
NRBC BLD AUTO-RTO: 0 /100 WBCS
PLATELET # BLD AUTO: 238 THOUSANDS/UL (ref 149–390)
PMV BLD AUTO: 9.8 FL (ref 8.9–12.7)
POTASSIUM SERPL-SCNC: 4.1 MMOL/L (ref 3.5–5.3)
PROT SERPL-MCNC: 6.6 G/DL (ref 6.4–8.4)
RBC # BLD AUTO: 4.52 MILLION/UL (ref 3.88–5.62)
SODIUM SERPL-SCNC: 135 MMOL/L (ref 135–147)
TESTOST SERPL-MCNC: 248 NG/DL (ref 95–948)
TRIGL SERPL-MCNC: 250 MG/DL
TSH SERPL DL<=0.05 MIU/L-ACNC: 2.03 UIU/ML (ref 0.45–4.5)
WBC # BLD AUTO: 6.86 THOUSAND/UL (ref 4.31–10.16)

## 2023-04-05 DIAGNOSIS — N48.1 BALANITIS: ICD-10-CM

## 2023-05-02 ENCOUNTER — APPOINTMENT (OUTPATIENT)
Dept: LAB | Facility: CLINIC | Age: 68
End: 2023-05-02

## 2023-05-02 DIAGNOSIS — E55.9 VITAMIN D DEFICIENCY: ICD-10-CM

## 2023-05-02 DIAGNOSIS — E11.69 DIABETES MELLITUS ASSOCIATED WITH HORMONAL ETIOLOGY (HCC): ICD-10-CM

## 2023-05-02 DIAGNOSIS — F52.21 ERECTILE DYSFUNCTION OF NONORGANIC ORIGIN: ICD-10-CM

## 2023-05-02 LAB
25(OH)D3 SERPL-MCNC: 25.6 NG/ML (ref 30–100)
ALBUMIN SERPL BCP-MCNC: 4.2 G/DL (ref 3.5–5)
ALP SERPL-CCNC: 47 U/L (ref 34–104)
ALT SERPL W P-5'-P-CCNC: 17 U/L (ref 7–52)
ANION GAP SERPL CALCULATED.3IONS-SCNC: 4 MMOL/L (ref 4–13)
AST SERPL W P-5'-P-CCNC: 17 U/L (ref 13–39)
BASOPHILS # BLD AUTO: 0.05 THOUSANDS/ΜL (ref 0–0.1)
BASOPHILS NFR BLD AUTO: 1 % (ref 0–1)
BILIRUB SERPL-MCNC: 0.36 MG/DL (ref 0.2–1)
BUN SERPL-MCNC: 15 MG/DL (ref 5–25)
CALCIUM SERPL-MCNC: 8.9 MG/DL (ref 8.4–10.2)
CHLORIDE SERPL-SCNC: 105 MMOL/L (ref 96–108)
CHOLEST SERPL-MCNC: 136 MG/DL
CO2 SERPL-SCNC: 28 MMOL/L (ref 21–32)
CREAT SERPL-MCNC: 0.79 MG/DL (ref 0.6–1.3)
EOSINOPHIL # BLD AUTO: 0.32 THOUSAND/ΜL (ref 0–0.61)
EOSINOPHIL NFR BLD AUTO: 4 % (ref 0–6)
ERYTHROCYTE [DISTWIDTH] IN BLOOD BY AUTOMATED COUNT: 13.6 % (ref 11.6–15.1)
GFR SERPL CREATININE-BSD FRML MDRD: 92 ML/MIN/1.73SQ M
GLUCOSE P FAST SERPL-MCNC: 122 MG/DL (ref 65–99)
HCT VFR BLD AUTO: 44.2 % (ref 36.5–49.3)
HDLC SERPL-MCNC: 37 MG/DL
HGB BLD-MCNC: 14.5 G/DL (ref 12–17)
IMM GRANULOCYTES # BLD AUTO: 0.04 THOUSAND/UL (ref 0–0.2)
IMM GRANULOCYTES NFR BLD AUTO: 1 % (ref 0–2)
LDLC SERPL CALC-MCNC: 61 MG/DL (ref 0–100)
LYMPHOCYTES # BLD AUTO: 1.9 THOUSANDS/ΜL (ref 0.6–4.47)
LYMPHOCYTES NFR BLD AUTO: 26 % (ref 14–44)
MCH RBC QN AUTO: 31.5 PG (ref 26.8–34.3)
MCHC RBC AUTO-ENTMCNC: 32.8 G/DL (ref 31.4–37.4)
MCV RBC AUTO: 96 FL (ref 82–98)
MONOCYTES # BLD AUTO: 0.68 THOUSAND/ΜL (ref 0.17–1.22)
MONOCYTES NFR BLD AUTO: 9 % (ref 4–12)
NEUTROPHILS # BLD AUTO: 4.29 THOUSANDS/ΜL (ref 1.85–7.62)
NEUTS SEG NFR BLD AUTO: 59 % (ref 43–75)
NONHDLC SERPL-MCNC: 99 MG/DL
NRBC BLD AUTO-RTO: 0 /100 WBCS
PLATELET # BLD AUTO: 248 THOUSANDS/UL (ref 149–390)
PMV BLD AUTO: 10.4 FL (ref 8.9–12.7)
POTASSIUM SERPL-SCNC: 4.5 MMOL/L (ref 3.5–5.3)
PROT SERPL-MCNC: 6.9 G/DL (ref 6.4–8.4)
RBC # BLD AUTO: 4.61 MILLION/UL (ref 3.88–5.62)
SODIUM SERPL-SCNC: 137 MMOL/L (ref 135–147)
TRIGL SERPL-MCNC: 192 MG/DL
WBC # BLD AUTO: 7.28 THOUSAND/UL (ref 4.31–10.16)

## 2023-05-03 LAB
EST. AVERAGE GLUCOSE BLD GHB EST-MCNC: 120 MG/DL
HBA1C MFR BLD: 5.8 %
TESTOST FREE SERPL-MCNC: 7.2 PG/ML (ref 6.6–18.1)
TESTOST SERPL-MCNC: 329 NG/DL (ref 264–916)

## 2023-05-09 DIAGNOSIS — N48.1 BALANITIS: ICD-10-CM

## 2023-06-26 DIAGNOSIS — N48.1 BALANITIS: ICD-10-CM

## 2023-08-05 ENCOUNTER — APPOINTMENT (OUTPATIENT)
Dept: LAB | Facility: CLINIC | Age: 68
End: 2023-08-05
Payer: MEDICARE

## 2023-08-05 DIAGNOSIS — E55.9 VITAMIN D DEFICIENCY: ICD-10-CM

## 2023-08-05 DIAGNOSIS — R79.89 LOW TESTOSTERONE: ICD-10-CM

## 2023-08-05 DIAGNOSIS — E11.69 TYPE 2 DIABETES MELLITUS WITH OTHER SPECIFIED COMPLICATION, WITHOUT LONG-TERM CURRENT USE OF INSULIN (HCC): ICD-10-CM

## 2023-08-05 LAB
25(OH)D3 SERPL-MCNC: 37.5 NG/ML (ref 30–100)
ALBUMIN SERPL BCP-MCNC: 4.2 G/DL (ref 3.5–5)
ALP SERPL-CCNC: 45 U/L (ref 34–104)
ALT SERPL W P-5'-P-CCNC: 14 U/L (ref 7–52)
ANION GAP SERPL CALCULATED.3IONS-SCNC: 5 MMOL/L
AST SERPL W P-5'-P-CCNC: 15 U/L (ref 13–39)
BASOPHILS # BLD AUTO: 0.04 THOUSANDS/ÂΜL (ref 0–0.1)
BASOPHILS NFR BLD AUTO: 1 % (ref 0–1)
BILIRUB SERPL-MCNC: 0.36 MG/DL (ref 0.2–1)
BUN SERPL-MCNC: 13 MG/DL (ref 5–25)
CALCIUM SERPL-MCNC: 8.7 MG/DL (ref 8.4–10.2)
CHLORIDE SERPL-SCNC: 108 MMOL/L (ref 96–108)
CHOLEST SERPL-MCNC: 163 MG/DL
CO2 SERPL-SCNC: 26 MMOL/L (ref 21–32)
CREAT SERPL-MCNC: 0.74 MG/DL (ref 0.6–1.3)
EOSINOPHIL # BLD AUTO: 0.23 THOUSAND/ÂΜL (ref 0–0.61)
EOSINOPHIL NFR BLD AUTO: 4 % (ref 0–6)
ERYTHROCYTE [DISTWIDTH] IN BLOOD BY AUTOMATED COUNT: 14.3 % (ref 11.6–15.1)
EST. AVERAGE GLUCOSE BLD GHB EST-MCNC: 131 MG/DL
GFR SERPL CREATININE-BSD FRML MDRD: 94 ML/MIN/1.73SQ M
GLUCOSE P FAST SERPL-MCNC: 115 MG/DL (ref 65–99)
HBA1C MFR BLD: 6.2 %
HCT VFR BLD AUTO: 43.8 % (ref 36.5–49.3)
HDLC SERPL-MCNC: 37 MG/DL
HGB BLD-MCNC: 14.6 G/DL (ref 12–17)
IMM GRANULOCYTES # BLD AUTO: 0.02 THOUSAND/UL (ref 0–0.2)
IMM GRANULOCYTES NFR BLD AUTO: 0 % (ref 0–2)
LDLC SERPL CALC-MCNC: 102 MG/DL (ref 0–100)
LYMPHOCYTES # BLD AUTO: 1.4 THOUSANDS/ÂΜL (ref 0.6–4.47)
LYMPHOCYTES NFR BLD AUTO: 23 % (ref 14–44)
MCH RBC QN AUTO: 31 PG (ref 26.8–34.3)
MCHC RBC AUTO-ENTMCNC: 33.3 G/DL (ref 31.4–37.4)
MCV RBC AUTO: 93 FL (ref 82–98)
MONOCYTES # BLD AUTO: 0.53 THOUSAND/ÂΜL (ref 0.17–1.22)
MONOCYTES NFR BLD AUTO: 9 % (ref 4–12)
NEUTROPHILS # BLD AUTO: 3.81 THOUSANDS/ÂΜL (ref 1.85–7.62)
NEUTS SEG NFR BLD AUTO: 63 % (ref 43–75)
NONHDLC SERPL-MCNC: 126 MG/DL
NRBC BLD AUTO-RTO: 0 /100 WBCS
PLATELET # BLD AUTO: 231 THOUSANDS/UL (ref 149–390)
PMV BLD AUTO: 10.3 FL (ref 8.9–12.7)
POTASSIUM SERPL-SCNC: 4.2 MMOL/L (ref 3.5–5.3)
PROT SERPL-MCNC: 6.8 G/DL (ref 6.4–8.4)
PTH-INTACT SERPL-MCNC: 72.9 PG/ML (ref 12–88)
RBC # BLD AUTO: 4.71 MILLION/UL (ref 3.88–5.62)
SODIUM SERPL-SCNC: 139 MMOL/L (ref 135–147)
TRIGL SERPL-MCNC: 121 MG/DL
WBC # BLD AUTO: 6.03 THOUSAND/UL (ref 4.31–10.16)

## 2023-08-05 PROCEDURE — 85025 COMPLETE CBC W/AUTO DIFF WBC: CPT

## 2023-08-05 PROCEDURE — 83970 ASSAY OF PARATHORMONE: CPT

## 2023-08-05 PROCEDURE — 84403 ASSAY OF TOTAL TESTOSTERONE: CPT

## 2023-08-05 PROCEDURE — 80061 LIPID PANEL: CPT

## 2023-08-05 PROCEDURE — 84402 ASSAY OF FREE TESTOSTERONE: CPT

## 2023-08-05 PROCEDURE — 83036 HEMOGLOBIN GLYCOSYLATED A1C: CPT

## 2023-08-05 PROCEDURE — 36415 COLL VENOUS BLD VENIPUNCTURE: CPT

## 2023-08-05 PROCEDURE — 80053 COMPREHEN METABOLIC PANEL: CPT

## 2023-08-05 PROCEDURE — 82306 VITAMIN D 25 HYDROXY: CPT

## 2023-08-07 LAB
TESTOST FREE SERPL-MCNC: 3.3 PG/ML (ref 6.6–18.1)
TESTOST SERPL-MCNC: 310 NG/DL (ref 264–916)

## 2023-11-12 DIAGNOSIS — N48.1 BALANITIS: ICD-10-CM

## 2023-11-19 ENCOUNTER — APPOINTMENT (OUTPATIENT)
Dept: LAB | Facility: CLINIC | Age: 68
End: 2023-11-19
Payer: MEDICARE

## 2023-11-19 DIAGNOSIS — I10 ESSENTIAL HYPERTENSION, MALIGNANT: ICD-10-CM

## 2023-11-19 DIAGNOSIS — E29.1 3-OXO-5 ALPHA-STEROID DELTA 4-DEHYDROGENASE DEFICIENCY: ICD-10-CM

## 2023-11-19 DIAGNOSIS — E11.69 DIABETES MELLITUS ASSOCIATED WITH HORMONAL ETIOLOGY (HCC): ICD-10-CM

## 2023-11-19 DIAGNOSIS — E78.2 MIXED HYPERLIPIDEMIA: ICD-10-CM

## 2023-11-19 LAB
ALBUMIN SERPL BCP-MCNC: 4.3 G/DL (ref 3.5–5)
ALP SERPL-CCNC: 52 U/L (ref 34–104)
ALT SERPL W P-5'-P-CCNC: 17 U/L (ref 7–52)
ANION GAP SERPL CALCULATED.3IONS-SCNC: 5 MMOL/L
AST SERPL W P-5'-P-CCNC: 16 U/L (ref 13–39)
BASOPHILS # BLD AUTO: 0.04 THOUSANDS/ÂΜL (ref 0–0.1)
BASOPHILS NFR BLD AUTO: 1 % (ref 0–1)
BILIRUB SERPL-MCNC: 0.37 MG/DL (ref 0.2–1)
BUN SERPL-MCNC: 15 MG/DL (ref 5–25)
CALCIUM SERPL-MCNC: 9 MG/DL (ref 8.4–10.2)
CHLORIDE SERPL-SCNC: 104 MMOL/L (ref 96–108)
CHOLEST SERPL-MCNC: 117 MG/DL
CO2 SERPL-SCNC: 28 MMOL/L (ref 21–32)
CREAT SERPL-MCNC: 0.74 MG/DL (ref 0.6–1.3)
EOSINOPHIL # BLD AUTO: 0.24 THOUSAND/ÂΜL (ref 0–0.61)
EOSINOPHIL NFR BLD AUTO: 3 % (ref 0–6)
ERYTHROCYTE [DISTWIDTH] IN BLOOD BY AUTOMATED COUNT: 13.9 % (ref 11.6–15.1)
GFR SERPL CREATININE-BSD FRML MDRD: 94 ML/MIN/1.73SQ M
GLUCOSE P FAST SERPL-MCNC: 117 MG/DL (ref 65–99)
HCT VFR BLD AUTO: 46.6 % (ref 36.5–49.3)
HDLC SERPL-MCNC: 38 MG/DL
HGB BLD-MCNC: 15.2 G/DL (ref 12–17)
IMM GRANULOCYTES # BLD AUTO: 0.03 THOUSAND/UL (ref 0–0.2)
IMM GRANULOCYTES NFR BLD AUTO: 0 % (ref 0–2)
LDLC SERPL CALC-MCNC: 62 MG/DL (ref 0–100)
LYMPHOCYTES # BLD AUTO: 1.41 THOUSANDS/ÂΜL (ref 0.6–4.47)
LYMPHOCYTES NFR BLD AUTO: 19 % (ref 14–44)
MCH RBC QN AUTO: 30.7 PG (ref 26.8–34.3)
MCHC RBC AUTO-ENTMCNC: 32.6 G/DL (ref 31.4–37.4)
MCV RBC AUTO: 94 FL (ref 82–98)
MONOCYTES # BLD AUTO: 0.59 THOUSAND/ÂΜL (ref 0.17–1.22)
MONOCYTES NFR BLD AUTO: 8 % (ref 4–12)
NEUTROPHILS # BLD AUTO: 4.96 THOUSANDS/ÂΜL (ref 1.85–7.62)
NEUTS SEG NFR BLD AUTO: 69 % (ref 43–75)
NONHDLC SERPL-MCNC: 79 MG/DL
NRBC BLD AUTO-RTO: 0 /100 WBCS
PLATELET # BLD AUTO: 249 THOUSANDS/UL (ref 149–390)
PMV BLD AUTO: 10.7 FL (ref 8.9–12.7)
POTASSIUM SERPL-SCNC: 4.2 MMOL/L (ref 3.5–5.3)
PROT SERPL-MCNC: 7 G/DL (ref 6.4–8.4)
PTH-INTACT SERPL-MCNC: 46.2 PG/ML (ref 12–88)
RBC # BLD AUTO: 4.95 MILLION/UL (ref 3.88–5.62)
SODIUM SERPL-SCNC: 137 MMOL/L (ref 135–147)
TESTOST SERPL-MSCNC: 224 NG/DL
TRIGL SERPL-MCNC: 83 MG/DL
TSH SERPL DL<=0.05 MIU/L-ACNC: 0.95 UIU/ML (ref 0.45–4.5)
WBC # BLD AUTO: 7.27 THOUSAND/UL (ref 4.31–10.16)

## 2023-11-19 PROCEDURE — 84443 ASSAY THYROID STIM HORMONE: CPT

## 2023-11-19 PROCEDURE — 80061 LIPID PANEL: CPT

## 2023-11-19 PROCEDURE — 83970 ASSAY OF PARATHORMONE: CPT

## 2023-11-19 PROCEDURE — 83036 HEMOGLOBIN GLYCOSYLATED A1C: CPT

## 2023-11-19 PROCEDURE — 36415 COLL VENOUS BLD VENIPUNCTURE: CPT

## 2023-11-19 PROCEDURE — 80053 COMPREHEN METABOLIC PANEL: CPT

## 2023-11-19 PROCEDURE — 85025 COMPLETE CBC W/AUTO DIFF WBC: CPT

## 2023-11-19 PROCEDURE — 84403 ASSAY OF TOTAL TESTOSTERONE: CPT

## 2023-11-20 LAB
EST. AVERAGE GLUCOSE BLD GHB EST-MCNC: 131 MG/DL
HBA1C MFR BLD: 6.2 %

## 2024-02-19 ENCOUNTER — APPOINTMENT (OUTPATIENT)
Dept: LAB | Facility: CLINIC | Age: 69
End: 2024-02-19
Payer: MEDICARE

## 2024-02-19 DIAGNOSIS — E55.9 VITAMIN D DEFICIENCY: ICD-10-CM

## 2024-02-19 DIAGNOSIS — E29.1 MALE HYPOGONADISM: ICD-10-CM

## 2024-02-19 DIAGNOSIS — E11.69 DIABETES MELLITUS ASSOCIATED WITH HORMONAL ETIOLOGY (HCC): ICD-10-CM

## 2024-05-08 ENCOUNTER — HOSPITAL ENCOUNTER (EMERGENCY)
Facility: HOSPITAL | Age: 69
Discharge: HOME | End: 2024-05-08
Attending: EMERGENCY MEDICINE
Payer: MEDICARE

## 2024-05-08 ENCOUNTER — APPOINTMENT (EMERGENCY)
Dept: RADIOLOGY | Facility: HOSPITAL | Age: 69
End: 2024-05-08
Payer: MEDICARE

## 2024-05-08 VITALS
SYSTOLIC BLOOD PRESSURE: 129 MMHG | OXYGEN SATURATION: 96 % | TEMPERATURE: 98.3 F | HEART RATE: 98 BPM | RESPIRATION RATE: 18 BRPM | DIASTOLIC BLOOD PRESSURE: 81 MMHG

## 2024-05-08 DIAGNOSIS — K59.00 CONSTIPATION: ICD-10-CM

## 2024-05-08 DIAGNOSIS — S39.012A LUMBAR SPINE STRAIN, INITIAL ENCOUNTER: Primary | ICD-10-CM

## 2024-05-08 PROCEDURE — 99284 EMERGENCY DEPT VISIT MOD MDM: CPT | Performed by: PHYSICIAN ASSISTANT

## 2024-05-08 PROCEDURE — 99283 EMERGENCY DEPT VISIT LOW MDM: CPT

## 2024-05-08 PROCEDURE — 96372 THER/PROPH/DIAG INJ SC/IM: CPT

## 2024-05-08 PROCEDURE — 72100 X-RAY EXAM L-S SPINE 2/3 VWS: CPT

## 2024-05-08 RX ORDER — LIDOCAINE 50 MG/G
1 PATCH TOPICAL ONCE
Status: DISCONTINUED | OUTPATIENT
Start: 2024-05-08 | End: 2024-05-08 | Stop reason: HOSPADM

## 2024-05-08 RX ORDER — KETOROLAC TROMETHAMINE 30 MG/ML
15 INJECTION, SOLUTION INTRAMUSCULAR; INTRAVENOUS ONCE
Status: DISCONTINUED | OUTPATIENT
Start: 2024-05-08 | End: 2024-05-08

## 2024-05-08 RX ORDER — KETOROLAC TROMETHAMINE 30 MG/ML
15 INJECTION, SOLUTION INTRAMUSCULAR; INTRAVENOUS ONCE
Status: COMPLETED | OUTPATIENT
Start: 2024-05-08 | End: 2024-05-08

## 2024-05-08 RX ORDER — METHOCARBAMOL 500 MG/1
500 TABLET, FILM COATED ORAL 4 TIMES DAILY
Qty: 20 TABLET | Refills: 0 | Status: SHIPPED | OUTPATIENT
Start: 2024-05-08 | End: 2024-05-13

## 2024-05-08 RX ORDER — ACETAMINOPHEN 325 MG/1
975 TABLET ORAL ONCE
Status: COMPLETED | OUTPATIENT
Start: 2024-05-08 | End: 2024-05-08

## 2024-05-08 RX ORDER — POLYETHYLENE GLYCOL 3350 17 G/17G
17 POWDER, FOR SOLUTION ORAL DAILY
Qty: 34 G | Refills: 0 | Status: SHIPPED | OUTPATIENT
Start: 2024-05-08 | End: 2024-05-10

## 2024-05-08 RX ADMIN — ACETAMINOPHEN 650 MG: 325 TABLET, FILM COATED ORAL at 01:32

## 2024-05-08 RX ADMIN — LIDOCAINE 1 PATCH: 700 PATCH TOPICAL at 01:28

## 2024-05-08 RX ADMIN — KETOROLAC TROMETHAMINE 15 MG: 30 INJECTION, SOLUTION INTRAMUSCULAR; INTRAVENOUS at 01:28

## 2024-05-08 NOTE — ED PROVIDER NOTES
History  Chief Complaint   Patient presents with    Back Pain     Lower left back pain that started 3 hours ago. Daughter states he has been packing and lifting heavy objects today. Onset of pain when he was lifting luggage denies urinary sx     Patient is a 68-year-old male with a history of stroke currently on 81 mg of aspirin with no significant past surgical history that presents to the emergency room with dull aching persistent nonradiating left lumbar/sacral pain for approximately 3 hours.  Patient denies associated symptoms.  Patient states that after he had lifted a heavy garbage bag and noted he had left lower back pain directly following.  Patient also stated that while he was packing for [place], he packed a couple suitcases and noted his left lower back pain worsened after picking up suitcases.  Patient denies bowel bladder retention.  Patient denies saddle anesthesias.  Patient denies numbness tingling or loss of power in any or all extremities.  Patient denies palliative factors with provocative factors of pressure to left buttock.  Patient has not effective treatment.  Patient denies fevers, chills, nausea, vomiting, diarrhea, constipation and urinary symptoms.  Patient has recent fall recent trauma.  Patient denies sick contacts or recent travel.  Patient denies new rash or skin changes.  Patient denies chest pain, shortness of breath, and abdominal pain.      History provided by:  Patient   used: No    Back Pain  Associated symptoms: no abdominal pain, no chest pain, no dysuria, no fever, no headaches, no numbness and no weakness        Prior to Admission Medications   Prescriptions Last Dose Informant Patient Reported? Taking?   Blood Glucose Monitoring Suppl (OneTouch Verio Reflect) w/Device KIT   No No   Sig: Use 1 kit 2 (two) times a day Please check sugars twice a day   Blood Pressure KIT   No No   Sig: Use 2 (two) times a day   aspirin (ECOTRIN LOW STRENGTH) 81 mg EC tablet    No No   Sig: Take 1 tablet (81 mg total) by mouth daily   atorvastatin (LIPITOR) 40 mg tablet   No No   Sig: Take 1 tablet (40 mg total) by mouth daily at bedtime   glucose blood (OneTouch Verio) test strip   No No   Sig: Check blood glucoses up to once per day and keep a log to bring to your appointment.   lisinopril (ZESTRIL) 10 mg tablet   No No   Sig: TAKE 1 TABLET BY MOUTH EVERY DAY   metFORMIN (GLUCOPHAGE) 500 mg tablet   No No   Sig: Take 2 tablets (1,000 mg total) by mouth 2 (two) times a day with meals   nystatin-triamcinolone (MYCOLOG-II) cream   No No   Sig: APPLY TOPICALLY IF NEEDED (AS NEEDED)   sitaGLIPtin (JANUVIA) 100 mg tablet   No No   Sig: Take 1 tablet (100 mg total) by mouth daily      Facility-Administered Medications: None       Past Medical History:   Diagnosis Date    Stroke (HCC)        History reviewed. No pertinent surgical history.    Family History   Problem Relation Age of Onset    Colon cancer Mother     Hyperlipidemia Father     Parkinsonism Father     No Known Problems Son     No Known Problems Daughter     No Known Problems Daughter     No Known Problems Daughter      I have reviewed and agree with the history as documented.    E-Cigarette/Vaping    E-Cigarette Use Never User      E-Cigarette/Vaping Substances    Nicotine No     THC No     CBD No     Flavoring No     Other No     Unknown No      Social History     Tobacco Use    Smoking status: Former     Types: Cigarettes    Smokeless tobacco: Never   Vaping Use    Vaping status: Never Used   Substance Use Topics    Alcohol use: Never     Comment: social    Drug use: No       Review of Systems   Constitutional:  Negative for activity change, appetite change, chills and fever.   HENT:  Negative for congestion, ear pain, postnasal drip, rhinorrhea, sinus pressure, sinus pain, sore throat and tinnitus.    Eyes:  Negative for photophobia, pain and visual disturbance.   Respiratory:  Negative for cough, chest tightness and shortness of  breath.    Cardiovascular:  Negative for chest pain and palpitations.   Gastrointestinal:  Negative for abdominal pain, constipation, diarrhea, nausea and vomiting.   Genitourinary:  Negative for difficulty urinating, dysuria, flank pain, frequency, hematuria and urgency.   Musculoskeletal:  Positive for back pain. Negative for arthralgias, gait problem, neck pain and neck stiffness.   Skin:  Negative for color change, pallor and rash.   Allergic/Immunologic: Negative for environmental allergies and food allergies.   Neurological:  Negative for dizziness, seizures, syncope, weakness, numbness and headaches.   Psychiatric/Behavioral:  Negative for confusion.    All other systems reviewed and are negative.      Physical Exam  Physical Exam  Vitals and nursing note reviewed.   Constitutional:       General: He is awake.      Appearance: Normal appearance. He is well-developed and normal weight. He is not ill-appearing, toxic-appearing or diaphoretic.      Comments: /81 (BP Location: Right arm)   Pulse 98   Temp 98.3 °F (36.8 °C) (Oral)   Resp 18   SpO2 96% ]     HENT:      Head: Normocephalic and atraumatic.      Jaw: There is normal jaw occlusion.      Right Ear: Hearing, tympanic membrane and external ear normal. No decreased hearing noted. No drainage, swelling or tenderness. No mastoid tenderness.      Left Ear: Hearing, tympanic membrane and external ear normal. No decreased hearing noted. No drainage, swelling or tenderness. No mastoid tenderness.      Nose: Nose normal.      Mouth/Throat:      Lips: Pink.      Mouth: Mucous membranes are moist.      Pharynx: Oropharynx is clear. Uvula midline.   Eyes:      General: Lids are normal. Vision grossly intact. Gaze aligned appropriately.         Right eye: No discharge.         Left eye: No discharge.      Extraocular Movements: Extraocular movements intact.      Conjunctiva/sclera: Conjunctivae normal.      Pupils: Pupils are equal, round, and reactive to  light.   Neck:      Vascular: No JVD.      Trachea: Trachea and phonation normal. No tracheal tenderness or tracheal deviation.   Cardiovascular:      Rate and Rhythm: Normal rate and regular rhythm.      Pulses: Normal pulses.           Radial pulses are 2+ on the right side and 2+ on the left side.        Posterior tibial pulses are 2+ on the right side and 2+ on the left side.      Heart sounds: Normal heart sounds.   Pulmonary:      Effort: Pulmonary effort is normal.      Breath sounds: Normal breath sounds and air entry. No stridor. No decreased breath sounds, wheezing, rhonchi or rales.   Chest:      Chest wall: No tenderness.   Abdominal:      General: Abdomen is flat. Bowel sounds are normal. There is no distension.      Palpations: Abdomen is soft. Abdomen is not rigid.      Tenderness: There is no abdominal tenderness. There is no guarding or rebound.   Musculoskeletal:         General: Normal range of motion.      Cervical back: Normal, full passive range of motion without pain, normal range of motion and neck supple. No rigidity. No spinous process tenderness or muscular tenderness. Normal range of motion.      Thoracic back: Normal.      Lumbar back: Tenderness present.        Back:       Comments: Passive ROM intact  Upper and lower extremity 5/5 bilaterally  Neurovascularly intact  No grinding or clicking of joints       Feet:      Right foot:      Toenail Condition: Right toenails are normal.      Left foot:      Toenail Condition: Left toenails are normal.   Lymphadenopathy:      Head:      Right side of head: No submental, submandibular, tonsillar, preauricular, posterior auricular or occipital adenopathy.      Left side of head: No submental, submandibular, tonsillar, preauricular, posterior auricular or occipital adenopathy.      Cervical: No cervical adenopathy.      Right cervical: No superficial, deep or posterior cervical adenopathy.     Left cervical: No superficial, deep or posterior  cervical adenopathy.   Skin:     General: Skin is warm.      Capillary Refill: Capillary refill takes less than 2 seconds.      Findings: No rash.   Neurological:      General: No focal deficit present.      Mental Status: He is alert and oriented to person, place, and time. Mental status is at baseline.      GCS: GCS eye subscore is 4. GCS verbal subscore is 5. GCS motor subscore is 6.      Sensory: No sensory deficit.      Deep Tendon Reflexes: Reflexes are normal and symmetric.      Reflex Scores:       Patellar reflexes are 2+ on the right side and 2+ on the left side.  Psychiatric:         Attention and Perception: Attention normal.         Mood and Affect: Mood normal.         Speech: Speech normal.         Behavior: Behavior normal. Behavior is cooperative.         Thought Content: Thought content normal.         Judgment: Judgment normal.         Vital Signs  ED Triage Vitals   Temperature Pulse Respirations Blood Pressure SpO2   05/08/24 0049 05/08/24 0049 05/08/24 0049 05/08/24 0049 05/08/24 0049   98.3 °F (36.8 °C) 98 18 129/81 96 %      Temp Source Heart Rate Source Patient Position - Orthostatic VS BP Location FiO2 (%)   05/08/24 0049 05/08/24 0049 05/08/24 0049 05/08/24 0049 --   Oral Monitor Sitting Right arm       Pain Score       05/08/24 0128       10 - Worst Possible Pain           Vitals:    05/08/24 0049   BP: 129/81   Pulse: 98   Patient Position - Orthostatic VS: Sitting         Visual Acuity      ED Medications  Medications   acetaminophen (TYLENOL) tablet 975 mg (650 mg Oral Given 5/8/24 0132)   ketorolac (TORADOL) injection 15 mg (15 mg Intramuscular Given 5/8/24 0128)       Diagnostic Studies  Results Reviewed       None                   XR spine lumbar 2 or 3 views injury   ED Interpretation by Robert Beckham PA-C (05/08 0226)                 Procedures  Procedures         ED Course                                             Medical Decision Making  Patient is a 68-year-old male with a  history of stroke currently on 81 mg of aspirin with no significant past surgical history that presents to the emergency room with dull aching persistent nonradiating left lumbar/sacral pain for approximately 3 hours.   Patient denies saddle anesthesias, bowel and bladder retention-doubt cauda equina syndrome; no Meningeal signs  No focal neurological signs; strength bilateral lower extremity 5/5  Delivered Toradol, Tylenol emergency department; patient verbalizes decrease in back pain status post medication delivery  Prescribed Robaxin and MiraLAX and counseled patient on medication administration and side effects  Lumbar xray with no acute osseous abnormality on initial read, left-sided stool burden   Follow-up with [facility]  Follow-up with PCP  Follow up with emergency department symptoms persist or exacerbate  Patient verbalized understanding of all clinical imaging findings, discharge instructions, follow-up, and verbalized agreement with current treatment plan.      Amount and/or Complexity of Data Reviewed  Radiology: ordered and independent interpretation performed. Decision-making details documented in ED Course.    Risk  OTC drugs.  Prescription drug management.             Disposition  Final diagnoses:   Lumbar spine strain, initial encounter - left    Constipation     Time reflects when diagnosis was documented in both MDM as applicable and the Disposition within this note       Time User Action Codes Description Comment    5/8/2024  2:21 AM Robert eBckham [S39.012A] Lumbar spine strain, initial encounter     5/8/2024  2:21 AM Robert Beckham Modify [S39.012A] Lumbar spine strain, initial encounter left     5/8/2024  2:21 AM Robert Beckhma [K59.00] Constipation           ED Disposition       ED Disposition   Discharge    Condition   Stable    Date/Time   Wed May 8, 2024  2:20 AM    Comment   Polo Anderson discharge to home/self care.                   Follow-up Information       Follow up  With Specialties Details Why Contact Info Additional Information    [facility]   [address] [facility] [address] [phone] [facility], [address] [place] [address], [address] [phone]    [facility] Emergency [facility] Emergency Medicine   [address] [address]  [phone] [facility] Emergency Department, [address], [place], [address]    [facility]   [phone]    [facility]   [phone]            Discharge Medication List as of 5/8/2024  2:22 AM        START taking these medications    Details   methocarbamol (ROBAXIN) 500 mg tablet Take 1 tablet (500 mg total) by mouth 4 (four) times a day for 5 days, Starting Wed 5/8/2024, Until Mon 5/13/2024, Normal      polyethylene glycol (MIRALAX) 17 g packet Take 17 g by mouth daily for 2 days, Starting Wed 5/8/2024, Until Fri 5/10/2024, Normal           CONTINUE these medications which have NOT CHANGED    Details   aspirin (ECOTRIN LOW STRENGTH) 81 mg EC tablet Take 1 tablet (81 mg total) by mouth daily, Starting Fri 2/5/2021, Normal      atorvastatin (LIPITOR) 40 mg tablet Take 1 tablet (40 mg total) by mouth daily at bedtime, Starting Fri 2/5/2021, Normal      Blood Glucose Monitoring Suppl (OneTouch Verio Reflect) w/Device KIT Use 1 kit 2 (two) times a day Please check sugars twice a day, Starting Mon 3/29/2021, Normal      Blood Pressure KIT Use 2 (two) times a day, Starting Thu 3/25/2021, Normal      glucose blood (OneTouch Verio) test strip Check blood glucoses up to once per day and keep a log to bring to your appointment., Normal      lisinopril (ZESTRIL) 10 mg tablet TAKE 1 TABLET BY MOUTH EVERY DAY, Normal      metFORMIN (GLUCOPHAGE) 500 mg tablet Take 2 tablets  (1,000 mg total) by mouth 2 (two) times a day with meals, Starting Tue 1/26/2021, Until Fri 4/9/2021, Normal      nystatin-triamcinolone (MYCOLOG-II) cream APPLY TOPICALLY IF NEEDED (AS NEEDED), Starting Mon 11/13/2023, Normal      sitaGLIPtin (JANUVIA) 100 mg tablet Take 1 tablet (100 mg total) by mouth daily, Starting Tue 6/8/2021, Sample                 PDMP Review         Value Time User    PDMP Reviewed  Yes 5/8/2024  2:20 AM Robert Beckham PA-C            ED Provider  Electronically Signed by             Robert Beckham PA-C  05/08/24 0432

## 2024-05-09 ENCOUNTER — TELEPHONE (OUTPATIENT)
Dept: PHYSICAL THERAPY | Facility: OTHER | Age: 69
End: 2024-05-09

## 2024-05-09 NOTE — TELEPHONE ENCOUNTER
Call placed to the patient per [facility] referral.    V/M left in [place] for patent to call back. Phone number and hours of business provided.    This is the 1st attempt to reach the patient. Will defer referral per protocol.

## 2024-05-16 NOTE — TELEPHONE ENCOUNTER
Call placed to the patient per [facility] referral.     V/M left in [place] for patent to call back. Phone number and hours of business provided.     This is the 2nd attempt to reach the patient. Will close referral per protocol.

## 2024-06-08 ENCOUNTER — APPOINTMENT (OUTPATIENT)
Dept: LAB | Facility: CLINIC | Age: 69
End: 2024-06-08
Payer: MEDICARE

## 2024-06-08 DIAGNOSIS — N18.2 CHRONIC KIDNEY DISEASE, STAGE II (MILD): ICD-10-CM

## 2024-06-08 DIAGNOSIS — E11.69 TYPE 2 DIABETES MELLITUS WITH OTHER SPECIFIED COMPLICATION, UNSPECIFIED WHETHER LONG TERM INSULIN USE (HCC): ICD-10-CM

## 2024-06-08 DIAGNOSIS — E78.2 MIXED DYSLIPIDEMIA: ICD-10-CM

## 2024-06-08 DIAGNOSIS — E29.1 MALE HYPOGONADISM: ICD-10-CM

## 2024-06-08 DIAGNOSIS — I10 ESSENTIAL HYPERTENSION, MALIGNANT: ICD-10-CM

## 2024-06-08 LAB
ALBUMIN SERPL BCP-MCNC: 4.2 G/DL (ref 3.5–5)
ALP SERPL-CCNC: 42 U/L (ref 34–104)
ALT SERPL W P-5'-P-CCNC: 15 U/L (ref 7–52)
ANION GAP SERPL CALCULATED.3IONS-SCNC: 4 MMOL/L (ref 4–13)
AST SERPL W P-5'-P-CCNC: 14 U/L (ref 13–39)
BASOPHILS # BLD AUTO: 0.06 THOUSANDS/ÂΜL (ref 0–0.1)
BASOPHILS NFR BLD AUTO: 1 % (ref 0–1)
BILIRUB SERPL-MCNC: 0.46 MG/DL (ref 0.2–1)
BUN SERPL-MCNC: 15 MG/DL (ref 5–25)
CALCIUM SERPL-MCNC: 9.3 MG/DL (ref 8.4–10.2)
CHLORIDE SERPL-SCNC: 104 MMOL/L (ref 96–108)
CHOLEST SERPL-MCNC: 135 MG/DL
CO2 SERPL-SCNC: 28 MMOL/L (ref 21–32)
CREAT SERPL-MCNC: 0.77 MG/DL (ref 0.6–1.3)
CREAT UR-MCNC: 140.2 MG/DL
EOSINOPHIL # BLD AUTO: 0.28 THOUSAND/ÂΜL (ref 0–0.61)
EOSINOPHIL NFR BLD AUTO: 4 % (ref 0–6)
ERYTHROCYTE [DISTWIDTH] IN BLOOD BY AUTOMATED COUNT: 14.5 % (ref 11.6–15.1)
GFR SERPL CREATININE-BSD FRML MDRD: 92 ML/MIN/1.73SQ M
GLUCOSE P FAST SERPL-MCNC: 115 MG/DL (ref 65–99)
HCT VFR BLD AUTO: 46.1 % (ref 36.5–49.3)
HDLC SERPL-MCNC: 37 MG/DL
HGB BLD-MCNC: 15.2 G/DL (ref 12–17)
IMM GRANULOCYTES # BLD AUTO: 0.04 THOUSAND/UL (ref 0–0.2)
IMM GRANULOCYTES NFR BLD AUTO: 1 % (ref 0–2)
LDLC SERPL CALC-MCNC: 68 MG/DL (ref 0–100)
LYMPHOCYTES # BLD AUTO: 1.51 THOUSANDS/ÂΜL (ref 0.6–4.47)
LYMPHOCYTES NFR BLD AUTO: 20 % (ref 14–44)
MCH RBC QN AUTO: 31.1 PG (ref 26.8–34.3)
MCHC RBC AUTO-ENTMCNC: 33 G/DL (ref 31.4–37.4)
MCV RBC AUTO: 95 FL (ref 82–98)
MICROALBUMIN UR-MCNC: 10.1 MG/L
MICROALBUMIN/CREAT 24H UR: 7 MG/G CREATININE (ref 0–30)
MONOCYTES # BLD AUTO: 0.59 THOUSAND/ÂΜL (ref 0.17–1.22)
MONOCYTES NFR BLD AUTO: 8 % (ref 4–12)
NEUTROPHILS # BLD AUTO: 5.22 THOUSANDS/ÂΜL (ref 1.85–7.62)
NEUTS SEG NFR BLD AUTO: 66 % (ref 43–75)
NONHDLC SERPL-MCNC: 98 MG/DL
NRBC BLD AUTO-RTO: 0 /100 WBCS
PLATELET # BLD AUTO: 230 THOUSANDS/UL (ref 149–390)
PMV BLD AUTO: 10.3 FL (ref 8.9–12.7)
POTASSIUM SERPL-SCNC: 4.1 MMOL/L (ref 3.5–5.3)
PROT SERPL-MCNC: 6.8 G/DL (ref 6.4–8.4)
PTH-INTACT SERPL-MCNC: 33.3 PG/ML (ref 12–88)
RBC # BLD AUTO: 4.88 MILLION/UL (ref 3.88–5.62)
SODIUM SERPL-SCNC: 136 MMOL/L (ref 135–147)
TRIGL SERPL-MCNC: 148 MG/DL
TSH SERPL DL<=0.05 MIU/L-ACNC: 1.46 UIU/ML (ref 0.45–4.5)
WBC # BLD AUTO: 7.7 THOUSAND/UL (ref 4.31–10.16)

## 2024-06-08 PROCEDURE — 82570 ASSAY OF URINE CREATININE: CPT

## 2024-06-08 PROCEDURE — 83970 ASSAY OF PARATHORMONE: CPT

## 2024-06-08 PROCEDURE — 85025 COMPLETE CBC W/AUTO DIFF WBC: CPT

## 2024-06-08 PROCEDURE — 80053 COMPREHEN METABOLIC PANEL: CPT

## 2024-06-08 PROCEDURE — 80061 LIPID PANEL: CPT

## 2024-06-08 PROCEDURE — 82043 UR ALBUMIN QUANTITATIVE: CPT

## 2024-06-08 PROCEDURE — 83036 HEMOGLOBIN GLYCOSYLATED A1C: CPT

## 2024-06-08 PROCEDURE — 84403 ASSAY OF TOTAL TESTOSTERONE: CPT

## 2024-06-08 PROCEDURE — 36415 COLL VENOUS BLD VENIPUNCTURE: CPT

## 2024-06-08 PROCEDURE — 84402 ASSAY OF FREE TESTOSTERONE: CPT

## 2024-06-08 PROCEDURE — 84443 ASSAY THYROID STIM HORMONE: CPT

## 2024-06-09 LAB
EST. AVERAGE GLUCOSE BLD GHB EST-MCNC: 126 MG/DL
HBA1C MFR BLD: 6 %

## 2024-06-10 LAB
TESTOST FREE SERPL-MCNC: 3.7 PG/ML (ref 6.6–18.1)
TESTOST SERPL-MCNC: 307 NG/DL (ref 264–916)

## 2024-08-26 ENCOUNTER — TELEPHONE (OUTPATIENT)
Dept: INTERNAL MEDICINE CLINIC | Facility: CLINIC | Age: 69
End: 2024-08-26

## 2024-10-10 ENCOUNTER — APPOINTMENT (OUTPATIENT)
Dept: LAB | Facility: CLINIC | Age: 69
End: 2024-10-10
Payer: MEDICARE

## 2024-10-10 DIAGNOSIS — E29.1 MALE HYPOGONADISM: ICD-10-CM

## 2024-10-10 DIAGNOSIS — E11.69 DIABETES MELLITUS ASSOCIATED WITH HORMONAL ETIOLOGY (HCC): ICD-10-CM

## 2024-10-10 DIAGNOSIS — E55.9 VITAMIN D DEFICIENCY: ICD-10-CM

## 2024-10-10 LAB
25(OH)D3 SERPL-MCNC: 72.9 NG/ML (ref 30–100)
ALBUMIN SERPL BCG-MCNC: 4.2 G/DL (ref 3.5–5)
ALP SERPL-CCNC: 44 U/L (ref 34–104)
ALT SERPL W P-5'-P-CCNC: 23 U/L (ref 7–52)
ANION GAP SERPL CALCULATED.3IONS-SCNC: 5 MMOL/L (ref 4–13)
AST SERPL W P-5'-P-CCNC: 18 U/L (ref 13–39)
BILIRUB SERPL-MCNC: 0.42 MG/DL (ref 0.2–1)
BUN SERPL-MCNC: 13 MG/DL (ref 5–25)
CALCIUM SERPL-MCNC: 9.2 MG/DL (ref 8.4–10.2)
CHLORIDE SERPL-SCNC: 104 MMOL/L (ref 96–108)
CO2 SERPL-SCNC: 27 MMOL/L (ref 21–32)
CREAT SERPL-MCNC: 0.78 MG/DL (ref 0.6–1.3)
EST. AVERAGE GLUCOSE BLD GHB EST-MCNC: 126 MG/DL
GFR SERPL CREATININE-BSD FRML MDRD: 91 ML/MIN/1.73SQ M
GLUCOSE P FAST SERPL-MCNC: 111 MG/DL (ref 65–99)
HBA1C MFR BLD: 6 %
POTASSIUM SERPL-SCNC: 4.2 MMOL/L (ref 3.5–5.3)
PROT SERPL-MCNC: 6.9 G/DL (ref 6.4–8.4)
SODIUM SERPL-SCNC: 136 MMOL/L (ref 135–147)
TESTOST SERPL-MSCNC: 268 NG/DL

## 2024-10-10 PROCEDURE — 84403 ASSAY OF TOTAL TESTOSTERONE: CPT

## 2024-10-10 PROCEDURE — 82306 VITAMIN D 25 HYDROXY: CPT

## 2024-10-10 PROCEDURE — 83036 HEMOGLOBIN GLYCOSYLATED A1C: CPT

## 2024-10-10 PROCEDURE — 80053 COMPREHEN METABOLIC PANEL: CPT

## 2024-10-10 PROCEDURE — 36415 COLL VENOUS BLD VENIPUNCTURE: CPT

## 2024-10-16 ENCOUNTER — TELEPHONE (OUTPATIENT)
Dept: INTERNAL MEDICINE CLINIC | Facility: CLINIC | Age: 69
End: 2024-10-16

## 2025-02-08 ENCOUNTER — APPOINTMENT (OUTPATIENT)
Dept: LAB | Facility: CLINIC | Age: 70
End: 2025-02-08
Payer: COMMERCIAL

## 2025-02-08 DIAGNOSIS — I10 HYPERTENSION, ESSENTIAL: ICD-10-CM

## 2025-02-08 DIAGNOSIS — E11.69 DIABETES MELLITUS ASSOCIATED WITH HORMONAL ETIOLOGY (HCC): ICD-10-CM

## 2025-02-08 LAB
ALBUMIN SERPL BCG-MCNC: 4.4 G/DL (ref 3.5–5)
ALP SERPL-CCNC: 58 U/L (ref 34–104)
ALT SERPL W P-5'-P-CCNC: 18 U/L (ref 7–52)
ANION GAP SERPL CALCULATED.3IONS-SCNC: 4 MMOL/L (ref 4–13)
AST SERPL W P-5'-P-CCNC: 18 U/L (ref 13–39)
BASOPHILS # BLD AUTO: 0.06 THOUSANDS/ΜL (ref 0–0.1)
BASOPHILS NFR BLD AUTO: 1 % (ref 0–1)
BILIRUB SERPL-MCNC: 0.5 MG/DL (ref 0.2–1)
BUN SERPL-MCNC: 9 MG/DL (ref 5–25)
CALCIUM SERPL-MCNC: 9.6 MG/DL (ref 8.4–10.2)
CHLORIDE SERPL-SCNC: 105 MMOL/L (ref 96–108)
CHOLEST SERPL-MCNC: 127 MG/DL (ref ?–200)
CO2 SERPL-SCNC: 29 MMOL/L (ref 21–32)
CREAT SERPL-MCNC: 0.78 MG/DL (ref 0.6–1.3)
EOSINOPHIL # BLD AUTO: 0.43 THOUSAND/ΜL (ref 0–0.61)
EOSINOPHIL NFR BLD AUTO: 6 % (ref 0–6)
ERYTHROCYTE [DISTWIDTH] IN BLOOD BY AUTOMATED COUNT: 13.9 % (ref 11.6–15.1)
EST. AVERAGE GLUCOSE BLD GHB EST-MCNC: 120 MG/DL
GFR SERPL CREATININE-BSD FRML MDRD: 91 ML/MIN/1.73SQ M
GLUCOSE P FAST SERPL-MCNC: 107 MG/DL (ref 65–99)
HBA1C MFR BLD: 5.8 %
HCT VFR BLD AUTO: 46 % (ref 36.5–49.3)
HDLC SERPL-MCNC: 41 MG/DL
HGB BLD-MCNC: 15.4 G/DL (ref 12–17)
IMM GRANULOCYTES # BLD AUTO: 0.02 THOUSAND/UL (ref 0–0.2)
IMM GRANULOCYTES NFR BLD AUTO: 0 % (ref 0–2)
LDLC SERPL CALC-MCNC: 62 MG/DL (ref 0–100)
LYMPHOCYTES # BLD AUTO: 1.5 THOUSANDS/ΜL (ref 0.6–4.47)
LYMPHOCYTES NFR BLD AUTO: 21 % (ref 14–44)
MCH RBC QN AUTO: 31.6 PG (ref 26.8–34.3)
MCHC RBC AUTO-ENTMCNC: 33.5 G/DL (ref 31.4–37.4)
MCV RBC AUTO: 95 FL (ref 82–98)
MONOCYTES # BLD AUTO: 0.63 THOUSAND/ΜL (ref 0.17–1.22)
MONOCYTES NFR BLD AUTO: 9 % (ref 4–12)
NEUTROPHILS # BLD AUTO: 4.41 THOUSANDS/ΜL (ref 1.85–7.62)
NEUTS SEG NFR BLD AUTO: 63 % (ref 43–75)
NONHDLC SERPL-MCNC: 86 MG/DL
NRBC BLD AUTO-RTO: 0 /100 WBCS
PLATELET # BLD AUTO: 242 THOUSANDS/UL (ref 149–390)
PMV BLD AUTO: 10.6 FL (ref 8.9–12.7)
POTASSIUM SERPL-SCNC: 4.2 MMOL/L (ref 3.5–5.3)
PROT SERPL-MCNC: 7.1 G/DL (ref 6.4–8.4)
RBC # BLD AUTO: 4.87 MILLION/UL (ref 3.88–5.62)
SODIUM SERPL-SCNC: 138 MMOL/L (ref 135–147)
TESTOST SERPL-MSCNC: 326 NG/DL (ref 175–781)
TRIGL SERPL-MCNC: 118 MG/DL (ref ?–150)
TSH SERPL DL<=0.05 MIU/L-ACNC: 1.74 UIU/ML (ref 0.45–4.5)
WBC # BLD AUTO: 7.05 THOUSAND/UL (ref 4.31–10.16)

## 2025-02-08 PROCEDURE — 80061 LIPID PANEL: CPT

## 2025-02-08 PROCEDURE — 80053 COMPREHEN METABOLIC PANEL: CPT

## 2025-02-08 PROCEDURE — 85025 COMPLETE CBC W/AUTO DIFF WBC: CPT

## 2025-02-08 PROCEDURE — 36415 COLL VENOUS BLD VENIPUNCTURE: CPT

## 2025-02-08 PROCEDURE — 84403 ASSAY OF TOTAL TESTOSTERONE: CPT

## 2025-02-08 PROCEDURE — 83036 HEMOGLOBIN GLYCOSYLATED A1C: CPT

## 2025-02-08 PROCEDURE — 84443 ASSAY THYROID STIM HORMONE: CPT

## 2025-05-10 ENCOUNTER — APPOINTMENT (OUTPATIENT)
Dept: LAB | Facility: CLINIC | Age: 70
End: 2025-05-10
Payer: COMMERCIAL

## 2025-05-10 DIAGNOSIS — E11.69 TYPE 2 DIABETES MELLITUS WITH OTHER SPECIFIED COMPLICATION, UNSPECIFIED WHETHER LONG TERM INSULIN USE (HCC): ICD-10-CM

## 2025-05-10 DIAGNOSIS — E55.9 AVITAMINOSIS D: ICD-10-CM

## 2025-05-10 DIAGNOSIS — E29.1 3-OXO-5 ALPHA-STEROID DELTA 4-DEHYDROGENASE DEFICIENCY: ICD-10-CM

## 2025-05-10 LAB
25(OH)D3 SERPL-MCNC: 60.9 NG/ML (ref 30–100)
ALBUMIN SERPL BCG-MCNC: 4.4 G/DL (ref 3.5–5)
ALP SERPL-CCNC: 49 U/L (ref 34–104)
ALT SERPL W P-5'-P-CCNC: 19 U/L (ref 7–52)
ANION GAP SERPL CALCULATED.3IONS-SCNC: 4 MMOL/L (ref 4–13)
AST SERPL W P-5'-P-CCNC: 17 U/L (ref 13–39)
BILIRUB SERPL-MCNC: 0.51 MG/DL (ref 0.2–1)
BUN SERPL-MCNC: 18 MG/DL (ref 5–25)
CALCIUM SERPL-MCNC: 9.6 MG/DL (ref 8.4–10.2)
CHLORIDE SERPL-SCNC: 105 MMOL/L (ref 96–108)
CO2 SERPL-SCNC: 28 MMOL/L (ref 21–32)
CREAT SERPL-MCNC: 0.94 MG/DL (ref 0.6–1.3)
CREAT UR-MCNC: 336.2 MG/DL
EST. AVERAGE GLUCOSE BLD GHB EST-MCNC: 126 MG/DL
GFR SERPL CREATININE-BSD FRML MDRD: 81 ML/MIN/1.73SQ M
GLUCOSE P FAST SERPL-MCNC: 104 MG/DL (ref 65–99)
HBA1C MFR BLD: 6 %
MICROALBUMIN UR-MCNC: 52.6 MG/L
MICROALBUMIN/CREAT 24H UR: 16 MG/G CREATININE (ref 0–30)
POTASSIUM SERPL-SCNC: 5.1 MMOL/L (ref 3.5–5.3)
PROT SERPL-MCNC: 7.4 G/DL (ref 6.4–8.4)
SODIUM SERPL-SCNC: 137 MMOL/L (ref 135–147)

## 2025-05-10 PROCEDURE — 82570 ASSAY OF URINE CREATININE: CPT

## 2025-05-10 PROCEDURE — 82306 VITAMIN D 25 HYDROXY: CPT

## 2025-05-10 PROCEDURE — 80053 COMPREHEN METABOLIC PANEL: CPT

## 2025-05-10 PROCEDURE — 36415 COLL VENOUS BLD VENIPUNCTURE: CPT

## 2025-05-10 PROCEDURE — 82043 UR ALBUMIN QUANTITATIVE: CPT

## 2025-05-10 PROCEDURE — 83036 HEMOGLOBIN GLYCOSYLATED A1C: CPT
